# Patient Record
Sex: FEMALE | Race: WHITE | NOT HISPANIC OR LATINO | Employment: OTHER | URBAN - METROPOLITAN AREA
[De-identification: names, ages, dates, MRNs, and addresses within clinical notes are randomized per-mention and may not be internally consistent; named-entity substitution may affect disease eponyms.]

---

## 2017-06-16 ENCOUNTER — TELEPHONE (OUTPATIENT)
Dept: CARDIOLOGY | Facility: CLINIC | Age: 62
End: 2017-06-16

## 2017-06-16 DIAGNOSIS — E78.2 MIXED HYPERLIPIDEMIA: ICD-10-CM

## 2017-06-16 DIAGNOSIS — I10 BENIGN ESSENTIAL HYPERTENSION: Primary | ICD-10-CM

## 2017-06-16 NOTE — TELEPHONE ENCOUNTER
Pt calling scheduling to ask if she will be due for labs 9/2017 with her annual OV. I reviewed 's last OV note. Pt was supposed to have bmp 3/2017 due to hx of hyperkalemia and being on lisinopril. Pt will also be due for flp 9/2017. Rigo MONTEZ

## 2017-09-08 ENCOUNTER — PRE VISIT (OUTPATIENT)
Dept: CARDIOLOGY | Facility: CLINIC | Age: 62
End: 2017-09-08

## 2017-09-13 ENCOUNTER — TRANSFERRED RECORDS (OUTPATIENT)
Dept: HEALTH INFORMATION MANAGEMENT | Facility: CLINIC | Age: 62
End: 2017-09-13

## 2017-09-13 ENCOUNTER — HOSPITAL ENCOUNTER (OUTPATIENT)
Dept: CARDIOLOGY | Facility: CLINIC | Age: 62
Discharge: HOME | End: 2017-09-13
Attending: INTERNAL MEDICINE | Admitting: INTERNAL MEDICINE
Payer: COMMERCIAL

## 2017-09-13 DIAGNOSIS — I25.10 CORONARY ARTERY DISEASE INVOLVING NATIVE CORONARY ARTERY OF NATIVE HEART WITHOUT ANGINA PECTORIS: ICD-10-CM

## 2017-09-13 DIAGNOSIS — R00.2 PALPITATIONS: ICD-10-CM

## 2017-09-13 DIAGNOSIS — E78.2 MIXED HYPERLIPIDEMIA: ICD-10-CM

## 2017-09-13 PROCEDURE — 93018 CV STRESS TEST I&R ONLY: CPT | Performed by: INTERNAL MEDICINE

## 2017-09-13 PROCEDURE — 93321 DOPPLER ECHO F-UP/LMTD STD: CPT | Mod: 26 | Performed by: INTERNAL MEDICINE

## 2017-09-13 PROCEDURE — 93350 STRESS TTE ONLY: CPT | Mod: TC

## 2017-09-13 PROCEDURE — 93350 STRESS TTE ONLY: CPT | Mod: 26 | Performed by: INTERNAL MEDICINE

## 2017-09-13 PROCEDURE — 93325 DOPPLER ECHO COLOR FLOW MAPG: CPT | Mod: 26 | Performed by: INTERNAL MEDICINE

## 2017-09-13 PROCEDURE — 93016 CV STRESS TEST SUPVJ ONLY: CPT | Performed by: INTERNAL MEDICINE

## 2017-09-14 ENCOUNTER — OFFICE VISIT (OUTPATIENT)
Dept: CARDIOLOGY | Facility: CLINIC | Age: 62
End: 2017-09-14
Attending: INTERNAL MEDICINE
Payer: COMMERCIAL

## 2017-09-14 VITALS
SYSTOLIC BLOOD PRESSURE: 118 MMHG | DIASTOLIC BLOOD PRESSURE: 68 MMHG | HEIGHT: 68 IN | WEIGHT: 119.3 LBS | HEART RATE: 60 BPM | BODY MASS INDEX: 18.08 KG/M2

## 2017-09-14 DIAGNOSIS — E78.2 MIXED HYPERLIPIDEMIA: ICD-10-CM

## 2017-09-14 DIAGNOSIS — I25.10 CORONARY ARTERY DISEASE INVOLVING NATIVE CORONARY ARTERY OF NATIVE HEART WITHOUT ANGINA PECTORIS: ICD-10-CM

## 2017-09-14 DIAGNOSIS — I47.29: Primary | ICD-10-CM

## 2017-09-14 DIAGNOSIS — R00.2 PALPITATIONS: ICD-10-CM

## 2017-09-14 LAB
ALT SERPL W P-5'-P-CCNC: 19 U/L (ref 0–50)
CHOLEST SERPL-MCNC: 164 MG/DL
HDLC SERPL-MCNC: 75 MG/DL
LDLC SERPL CALC-MCNC: 80 MG/DL
NONHDLC SERPL-MCNC: 89 MG/DL
TRIGL SERPL-MCNC: 47 MG/DL

## 2017-09-14 PROCEDURE — 36415 COLL VENOUS BLD VENIPUNCTURE: CPT | Performed by: INTERNAL MEDICINE

## 2017-09-14 PROCEDURE — 84460 ALANINE AMINO (ALT) (SGPT): CPT | Performed by: INTERNAL MEDICINE

## 2017-09-14 PROCEDURE — 99214 OFFICE O/P EST MOD 30 MIN: CPT | Performed by: INTERNAL MEDICINE

## 2017-09-14 PROCEDURE — 80061 LIPID PANEL: CPT | Performed by: INTERNAL MEDICINE

## 2017-09-14 RX ORDER — METOPROLOL SUCCINATE 25 MG/1
12.5 TABLET, EXTENDED RELEASE ORAL DAILY
Qty: 15 TABLET | Refills: 3 | Status: SHIPPED | OUTPATIENT
Start: 2017-09-14 | End: 2017-10-04

## 2017-09-14 NOTE — MR AVS SNAPSHOT
After Visit Summary   9/14/2017    Robin Cazares    MRN: [id number]           Patient Information     Date Of Birth          1955        Visit Information        Provider Department      9/14/2017 9:15 AM Ginny Baldwin DO [facility]        Today's Diagnoses     Ventricular tachycardia, re-entry (H)    -  1    Coronary artery disease involving native coronary artery of native heart without angina pectoris        Mixed hyperlipidemia        Palpitations          Care Instructions    RVOT VT (right ventricular outflow tract ventricular tachycardia)          Follow-ups after your visit        Additional Services     Follow-Up with Electrophysiologist                 Your next 10 appointments already scheduled     Oct 04, 2017  9:15 AM CDT   EP NEW with Neela Maria MD   [facility] ([facility])    [address] [address] [address] [phone]              Future tests that were ordered for you today     Open Future Orders        Priority Expected Expires Ordered    Follow-Up with Electrophysiologist Routine 9/29/2017 9/14/2018 9/14/2017            Who to contact     If you have questions or need follow up information about today's clinic visit or your schedule please contact [facility] directly at [phone].  Normal or non-critical lab and imaging results will be communicated to you by MyChart, letter or phone within 4 business days after the clinic has received the results. If you do not hear from us within 7 days, please contact the clinic through MyChart or phone. If you have a critical or abnormal lab result, we will notify you by phone as soon as possible.  Submit refill requests through [employer] or call your pharmacy and they will forward the refill request to us. Please allow 3 business days for your  "refill to be completed.          Additional Information About Your Visit        qLearning Information     qLearning lets you send messages to your doctor, view your test results, renew your prescriptions, schedule appointments and more. To sign up, go to [email or web address] . Click on \"Log in\" on the left side of the screen, which will take you to the Welcome page. Then click on \"Sign up Now\" on the right side of the page.     You will be asked to enter the access code listed below, as well as some personal information. Please follow the directions to create your username and password.     Your access code is: [email or web address]  Expires: 2017  9:37 AM     Your access code will  in 90 days. If you need help or a new code, please call your [place] clinic or [phone].        Care EveryWhere ID     This is your Care EveryWhere ID. This could be used by other organizations to access your [place] medical records  [id number]        Your Vitals Were     Pulse Height BMI (Body Mass Index)             60 1.715 m (5' 7.5\") 18.41 kg/m2          Blood Pressure from Last 3 Encounters:   17 118/68   16 118/80   08/24/15 136/82    Weight from Last 3 Encounters:   17 54.1 kg (119 lb 4.8 oz)   16 58.7 kg (129 lb 6.4 oz)   08/24/15 58.7 kg (129 lb 6.4 oz)              We Performed the Following     Follow-Up with Cardiologist          Today's Medication Changes          These changes are accurate as of: 17  9:52 AM.  If you have any questions, ask your nurse or doctor.               Start taking these medicines.        Dose/Directions    metoprolol 25 MG 24 hr tablet   Commonly known as:  TOPROL-XL   Used for:  Palpitations   Started by:  Ginny Baldwin DO        Dose:  12.5 mg   Take 0.5 tablets (12.5 mg) by mouth daily   Quantity:  15 tablet   Refills:  3            Where to get your medicines      These medications were sent to [facility] - [facility], [place] - " [address] [address]     Phone:  [phone]     metoprolol 25 MG 24 hr tablet                Primary Care Provider Office Phone # Fax #    Keya Sandra [phone]       [place] [facility] [address] [place] [facility] [address]        Equal Access to Services     DEON STOUT : Hadii aad ku hadasho Soomaali, waaxda luqadaha, qaybta kaalmada adeegyada, waxay idiin hayaan adeeg kharash la'aan ah. So [facility] [phone].    ATENCIÓN: Si habla español, tiene a bishop disposición servicios gratuitos de asistencia lingüística. [place] al [phone].    We comply with applicable federal civil rights laws and [place] laws. We do not discriminate on the basis of race, color, national origin, age, disability sex, sexual orientation or gender identity.            Thank you!     Thank you for choosing [facility] [place]  for your care. Our goal is always to provide you with excellent care. Hearing back from our patients is one way we can continue to improve our services. Please take a few minutes to complete the written survey that you may receive in the mail after your visit with us. Thank you!             Your Updated Medication List - Protect others around you: Learn how to safely use, store and throw away your medicines at www.disposemymeds.org.          This list is accurate as of: 9/14/17  9:52 AM.  Always use your most recent med list.                   Brand Name Dispense Instructions for use Diagnosis    aspirin 81 MG chewable tablet      Take 81 mg by mouth daily.        lisinopril 2.5 MG tablet    PRINIVIL/Zestril    90 tablet    Take 1 tablet (2.5 mg) by mouth daily    CAD (coronary artery disease)       metoprolol 25 MG 24 hr tablet    TOPROL-XL    15 tablet    Take 0.5 tablets (12.5 mg) by mouth daily    Palpitations       PROLIA SC      As directed        simvastatin 40 MG tablet    ZOCOR    90 tablet     Take 1 tablet (40 mg) by mouth At Bedtime    CAD (coronary artery disease), Inferior MI (H)       TYLENOL PO      Take by mouth as needed for mild pain or fever

## 2017-09-14 NOTE — PROGRESS NOTES
HPI and Plan:   See dictation  [id number]  Orders Placed This Encounter   Procedures     Follow-Up with Electrophysiologist       Orders Placed This Encounter   Medications     Acetaminophen (TYLENOL PO)     Sig: Take by mouth as needed for mild pain or fever     metoprolol (TOPROL-XL) 25 MG 24 hr tablet     Sig: Take 0.5 tablets (12.5 mg) by mouth daily     Dispense:  15 tablet     Refill:  3       There are no discontinued medications.      Encounter Diagnoses   Name Primary?     Coronary artery disease involving native coronary artery of native heart without angina pectoris      Mixed hyperlipidemia      Palpitations      Ventricular tachycardia, re-entry (H) Yes       CURRENT MEDICATIONS:  Current Outpatient Prescriptions   Medication Sig Dispense Refill     Acetaminophen (TYLENOL PO) Take by mouth as needed for mild pain or fever       metoprolol (TOPROL-XL) 25 MG 24 hr tablet Take 0.5 tablets (12.5 mg) by mouth daily 15 tablet 3     lisinopril (PRINIVIL/ZESTRIL) 2.5 MG tablet Take 1 tablet (2.5 mg) by mouth daily 90 tablet 3     simvastatin (ZOCOR) 40 MG tablet Take 1 tablet (40 mg) by mouth At Bedtime 90 tablet 3     Denosumab (PROLIA SC) As directed       aspirin 81 MG chewable tablet Take 81 mg by mouth daily.         ALLERGIES     Allergies   Allergen Reactions     Fosamax [Alendronic Acid] Palpitations       PAST MEDICAL HISTORY:  Past Medical History:   Diagnosis Date     CAD (coronary artery disease)     8/2008 angioplasty occluded distal OM branch     Hyperlipidemia      Internal hemorrhoids      MI (myocardial infarction) (H)     2008     Osteopenia      Osteoporosis      Palpitations      S/P breast lumpectomy      Vaginal bleeding        PAST SURGICAL HISTORY:  Past Surgical History:   Procedure Laterality Date     HEART CATH, ANGIOPLASTY      8/2008 angioplasty occluded distal OM branch       FAMILY HISTORY:  Family History   Problem Relation Age of Onset     HEART DISEASE Father      Leukemia Mother   "    HEART DISEASE Other        SOCIAL HISTORY:  Social History     Social History     Marital status:      Spouse name: N/A     Number of children: N/A     Years of education: N/A     Social History Main Topics     Smoking status: Never Smoker     Smokeless tobacco: Never Used     Alcohol use 0.0 oz/week     0 Standard drinks or equivalent per week      Comment: moderate     Drug use: None     Sexual activity: Not Asked     Other Topics Concern     Caffeine Concern No     rare     Sleep Concern Yes     Stress Concern No     Weight Concern No     Special Diet No     Exercise No     walks 5-7 days week     Social History Narrative       Review of Systems:  Skin:  Negative       Eyes:  Positive for glasses    ENT:  Positive for postnasal drainage drippy nose right now ,  occas ringing in ears   Respiratory:  Negative       Cardiovascular:    Positive for;palpitations;fatigue occas. palpitations -  usually feels palpitations  when overly tired or  dehydrated , decrease in energy -  occas fatigue   Gastroenterology: Negative      Genitourinary:  Negative      Musculoskeletal:  Positive for back pain lower back pain - stretches daily   Neurologic:  Positive for headaches occas.   Psychiatric:  Positive for sleep disturbances pt states she does not sleep enough ,   Heme/Lymph/Imm:  Positive for easy bruising;allergies;night sweats    Endocrine:  Positive for night sweats occas. night sweats     Physical Exam:  Vitals: /68 (BP Location: Right arm, Patient Position: Sitting, Cuff Size: Adult Regular)  Pulse 60  Ht 1.715 m (5' 7.5\")  Wt 54.1 kg (119 lb 4.8 oz)  BMI 18.41 kg/m2    Constitutional:  cooperative, alert and oriented, well developed, well nourished, in no acute distress        Skin:  warm and dry to the touch        Head:  normocephalic, no masses or lesions        Eyes:  pupils equal and round        ENT:  no pallor or cyanosis, dentition good        Neck:  carotid pulses are full and equal " bilaterally        Chest:  clear to auscultation          Cardiac: regular rhythm;no murmurs, gallops or rubs detected                  Abdomen:  abdomen soft;no bruits        Vascular: pulses full and equal, no bruits auscultated                                        Extremities and Back:  no deformities, clubbing, cyanosis, erythema observed              Neurological:  affect appropriate, oriented to time, person and place;no gross motor deficits              CC  Ginny Baldwin, DO  [address]  [place] [id number]

## 2017-09-14 NOTE — LETTER
9/14/2017    [facility]   [address] [facility] [place] [address]    RE: Robin Cazares       Dear Colleague,    I had the pleasure of seeing Marchristymaria g Sewelllivan in the [facility].    REFERRING PHYSICIAN:  Dr. Keya Sandra      Ms. Cazares is a very pleasant 62-year-old female with a history of coronary disease.  She had a previous inferior wall myocardial infarction back in 2008 and underwent revascularization of her right coronary artery at that time.  She is here for a followup visit today and we had ordered a stress echo this year for monitoring for progression of ischemia.  Last year when we talked, she was having some symptoms of palpitations with exertion.  We had talked about doing a stress test last year but she wanted to push it off until this year.  She is continuing to have symptoms of palpitations on occasion, most commonly with exercise.  She denies any lightheadedness or dizziness, presyncope or syncopal events.  She does not really have any difficulty breathing and is very active, exercising most days of the week.  She did have a little bit of trouble, however, at altitude when she was hiking in the mountains.  When she would have palpitations, she felt more out of breath than she thought she should be but has not felt this way over the past year.  She walked on a treadmill, Kee protocol, for 11-1/2 minutes.  At peak exercise, she did develop a wide-complex tachycardia.  This was reviewed with Electrophysiology and felt to be probably right ventricular outflow tract ventricular tachycardia.  She was recommended to follow up with Electrophysiology with a possible ZIO Patch monitor.  The echocardiogram did not demonstrate evidence for ischemia.  There was a small discrete area of infarct in the inferior septal wall from her previous heart attack.  Her ejection fraction did augment with exercise and she did not have any  symptoms during the stress testing.        LABORATORIES:  She had a cholesterol profile drawn today which I have reviewed use with her.  Total cholesterol is 164, HDL 75, LDL 80 and triglycerides 47.  Liver function tests were normal.      PHYSICAL EXAMINATION:     VITALS SIGNS:  Today, her blood pressure is 118/68, pulse is 60, weight 119, body mass index 18.  She has dropped about 10 pounds in the last year and we did talk about this.  It is probably related to the increased activity and exercise, based upon her history.     NECK:  Carotid upstrokes are brisk without bruit.   CARDIOVASCULAR:  Tones are regular.  I do not hear a murmur, gallop or rub.   LUNGS:  Clear posteriorly.   EXTREMITIES:  She has strong and symmetric pulses in the upper and lower extremities without peripheral edema.     Outpatient Encounter Prescriptions as of 9/14/2017   Medication Sig Dispense Refill     Acetaminophen (TYLENOL PO) Take by mouth as needed for mild pain or fever       [DISCONTINUED] metoprolol (TOPROL-XL) 25 MG 24 hr tablet Take 0.5 tablets (12.5 mg) by mouth daily (Patient not taking: Reported on 10/4/2017) 15 tablet 3     lisinopril (PRINIVIL/ZESTRIL) 2.5 MG tablet Take 1 tablet (2.5 mg) by mouth daily 90 tablet 3     simvastatin (ZOCOR) 40 MG tablet Take 1 tablet (40 mg) by mouth At Bedtime 90 tablet 3     Denosumab (PROLIA SC) Inject Subcutaneous every 6 months As directed        aspirin 81 MG chewable tablet Take 81 mg by mouth daily.       No facility-administered encounter medications on file as of 9/14/2017.       SUMMARY:  Ms. Cazares is a very pleasant 62-year-old female with history of coronary artery disease, previous inferior wall myocardial infarction with preserved LV systolic function.  Her stress test did not indicate evidence for progression of ischemia; however, did demonstrate evidence of a nonsustained ventricular tachycardia, felt to be related to RV outflow tract, per EP.  She has not been on a beta  blocker due to a slow pulse; however, I will try her on a low dose beta blocker at this time given the above findings.  I have started her at 12.5 mg of long-acting metoprolol and cautioned her about the potential side effects.  I will have her follow up with EP for the palpitations and VT noted on stress echo.  Please feel free to contact me with any questions you have in regards to her care.     Again, thank you for allowing me to participate in the care of your patient.      Sincerely,    Ginny Baldwin, DO     [facility]

## 2017-09-14 NOTE — PROGRESS NOTES
REFERRING PHYSICIAN:  Dr. Keya Sandra      HISTORY OF PRESENT ILLNESS:  Ms. Cazares is a very pleasant 62-year-old female with a history of coronary disease.  She had a previous inferior wall myocardial infarction back in 2008 and underwent revascularization of her right coronary artery at that time.  She is here for a followup visit today and we had ordered a stress echo this year for monitoring for progression of ischemia.  Last year when we talked, she was having some symptoms of palpitations with exertion.  We had talked about doing a stress test last year but she wanted to push it off until this year.  She is continuing to have symptoms of palpitations on occasion, most commonly with exercise.  She denies any lightheadedness or dizziness, presyncope or syncopal events.  She does not really have any difficulty breathing and is very active, exercising most days of the week.  She did have a little bit of trouble, however, at altitude when she was hiking in the mountains.  When she would have palpitations, she felt more out of breath than she thought she should be but has not felt this way over the past year.  She walked on a treadmill, Kee protocol, for 11-1/2 minutes.  At peak exercise, she did develop a wide-complex tachycardia.  This was reviewed with Electrophysiology and felt to be probably right ventricular outflow tract ventricular tachycardia.  She was recommended to follow up with Electrophysiology with a possible ZIO Patch monitor.  The echocardiogram did not demonstrate evidence for ischemia.  There was a small discrete area of infarct in the inferior septal wall from her previous heart attack.  Her ejection fraction did augment with exercise and she did not have any symptoms during the stress testing.        LABORATORIES:  She had a cholesterol profile drawn today which I have reviewed use with her.  Total cholesterol is 164, HDL 75, LDL 80 and triglycerides 47.  Liver function tests were  normal.      PHYSICAL EXAMINATION:     VITALS SIGNS:  Today, her blood pressure is 118/68, pulse is 60, weight 119, body mass index 18.  She has dropped about 10 pounds in the last year and we did talk about this.  It is probably related to the increased activity and exercise, based upon her history.     NECK:  Carotid upstrokes are brisk without bruit.   CARDIOVASCULAR:  Tones are regular.  I do not hear a murmur, gallop or rub.   LUNGS:  Clear posteriorly.   EXTREMITIES:  She has strong and symmetric pulses in the upper and lower extremities without peripheral edema.      SUMMARY:  Ms. Cazares is a very pleasant 62-year-old female with history of coronary artery disease, previous inferior wall myocardial infarction with preserved LV systolic function.  Her stress test did not indicate evidence for progression of ischemia; however, did demonstrate evidence of a nonsustained ventricular tachycardia, felt to be related to RV outflow tract, per EP.  She has not been on a beta blocker due to a slow pulse; however, I will try her on a low dose beta blocker at this time given the above findings.  I have started her at 12.5 mg of long-acting metoprolol and cautioned her about the potential side effects.  I will have her follow up with EP for the palpitations and VT noted on stress echo.  Please feel free to contact me with any questions you have in regards to her care.      cc:   Keya Sandra MD    [facility] [place]   [address] [place] [address]         IMELDA ORD DO             D: 2017 09:51   T: 2017 11:18   MT: AUGUSTUS      Name:     RAS CAZARES   MRN:      [id number]        Account:      [id number]   :      1955           Service Date: 2017      Document: [id number]

## 2017-10-04 ENCOUNTER — OFFICE VISIT (OUTPATIENT)
Dept: CARDIOLOGY | Facility: CLINIC | Age: 62
End: 2017-10-04
Attending: INTERNAL MEDICINE
Payer: COMMERCIAL

## 2017-10-04 VITALS
BODY MASS INDEX: 18.19 KG/M2 | DIASTOLIC BLOOD PRESSURE: 76 MMHG | SYSTOLIC BLOOD PRESSURE: 128 MMHG | WEIGHT: 120 LBS | HEIGHT: 68 IN | HEART RATE: 66 BPM

## 2017-10-04 DIAGNOSIS — I25.10 CORONARY ARTERY DISEASE INVOLVING NATIVE CORONARY ARTERY OF NATIVE HEART WITHOUT ANGINA PECTORIS: ICD-10-CM

## 2017-10-04 DIAGNOSIS — R00.2 PALPITATIONS: ICD-10-CM

## 2017-10-04 DIAGNOSIS — I47.29 PAROXYSMAL VENTRICULAR TACHYCARDIA (H): Primary | ICD-10-CM

## 2017-10-04 DIAGNOSIS — E78.2 MIXED HYPERLIPIDEMIA: ICD-10-CM

## 2017-10-04 PROCEDURE — 99215 OFFICE O/P EST HI 40 MIN: CPT | Performed by: INTERNAL MEDICINE

## 2017-10-04 RX ORDER — METOPROLOL SUCCINATE 25 MG/1
25 TABLET, EXTENDED RELEASE ORAL DAILY
Qty: 30 TABLET | Refills: 3 | COMMUNITY
Start: 2017-10-04 | End: 2017-11-15

## 2017-10-04 RX ORDER — MULTIVITAMIN
1 TABLET ORAL DAILY
COMMUNITY

## 2017-10-04 NOTE — PROGRESS NOTES
HPI and Plan:   See dictation    Orders Placed This Encounter   Procedures     Follow-Up with Cardiac Advanced Practice Provider     Singh Patch Monitor       Orders Placed This Encounter   Medications     calcium carb 1250 mg, 500 mg Belkofski,/vitamin D 200 unit (OSCAL 500/200 D-3) 500-200 MG-UNIT per tablet     Sig: Take 1 tablet by mouth as needed (Pt takes when she feels she has not had enough calcium)     Multiple vitamin TABS     Sig: Take by mouth as needed (Pt takes on a PRN basis)     metoprolol (TOPROL-XL) 25 MG 24 hr tablet     Sig: Take 1 tablet (25 mg) by mouth daily     Dispense:  30 tablet     Refill:  3       Medications Discontinued During This Encounter   Medication Reason     metoprolol (TOPROL-XL) 25 MG 24 hr tablet Reorder         Encounter Diagnoses   Name Primary?     Paroxysmal ventricular tachycardia (H) Yes     Coronary artery disease involving native coronary artery of native heart without angina pectoris      Mixed hyperlipidemia      Palpitations        CURRENT MEDICATIONS:  Current Outpatient Prescriptions   Medication Sig Dispense Refill     calcium carb 1250 mg, 500 mg Belkofski,/vitamin D 200 unit (OSCAL 500/200 D-3) 500-200 MG-UNIT per tablet Take 1 tablet by mouth as needed (Pt takes when she feels she has not had enough calcium)       Multiple vitamin TABS Take by mouth as needed (Pt takes on a PRN basis)       metoprolol (TOPROL-XL) 25 MG 24 hr tablet Take 1 tablet (25 mg) by mouth daily 30 tablet 3     Acetaminophen (TYLENOL PO) Take by mouth as needed for mild pain or fever       lisinopril (PRINIVIL/ZESTRIL) 2.5 MG tablet Take 1 tablet (2.5 mg) by mouth daily 90 tablet 3     simvastatin (ZOCOR) 40 MG tablet Take 1 tablet (40 mg) by mouth At Bedtime 90 tablet 3     Denosumab (PROLIA SC) Inject Subcutaneous every 6 months As directed        aspirin 81 MG chewable tablet Take 81 mg by mouth daily.       [DISCONTINUED] metoprolol (TOPROL-XL) 25 MG 24 hr tablet Take 0.5 tablets (12.5 mg) by  mouth daily (Patient not taking: Reported on 10/4/2017) 15 tablet 3       ALLERGIES     Allergies   Allergen Reactions     Fosamax [Alendronic Acid] Palpitations       PAST MEDICAL HISTORY:  Past Medical History:   Diagnosis Date     CAD (coronary artery disease)     8/2008 angioplasty occluded distal OM branch     Hyperlipidemia      Internal hemorrhoids      MI (myocardial infarction)     2008     Osteopenia      Osteoporosis      Palpitations      S/P breast lumpectomy      Vaginal bleeding        PAST SURGICAL HISTORY:  Past Surgical History:   Procedure Laterality Date     HEART CATH, ANGIOPLASTY      8/2008 angioplasty occluded distal OM branch       FAMILY HISTORY:  Family History   Problem Relation Age of Onset     HEART DISEASE Father      Leukemia Mother      HEART DISEASE Other        SOCIAL HISTORY:  Social History     Social History     Marital status:      Spouse name: N/A     Number of children: N/A     Years of education: N/A     Social History Main Topics     Smoking status: Never Smoker     Smokeless tobacco: Never Used     Alcohol use 0.0 oz/week     0 Standard drinks or equivalent per week      Comment: rare      Drug use: None     Sexual activity: Not Asked     Other Topics Concern     Caffeine Concern No     rare     Sleep Concern Yes     Stress Concern No     Weight Concern No     Special Diet No     Exercise No     walks 5-7 days week     Social History Narrative       Review of Systems:  Skin:  Negative       Eyes:  Positive for glasses    ENT:    postnasal drainage;sinus trouble   occas ringing in ears ,  possible seasonal sinus issues   Respiratory:  Negative       Cardiovascular:    Positive for;palpitations;fatigue occas. palpitations -  usually feels palpitations  when overly tired or  dehydrated - also pt states  she feels more palpitations if she takes calcium / or multiple vitamins , decrease in energy -  occas fatigue   Gastroenterology: Negative      Genitourinary:   Negative      Musculoskeletal:  Positive for back pain lower back pain - stretches daily   Neurologic:  Positive for headaches occas.   Psychiatric:  Positive for sleep disturbances pt states she does not sleep enough ,   Heme/Lymph/Imm:  Positive for easy bruising;allergies;night sweats occas. night sweats   Endocrine:  Positive for night sweats occas. night sweats     [id number]

## 2017-10-04 NOTE — PROGRESS NOTES
"HISTORY OF PRESENT ILLNESS:    It was my pleasure seeing Ms. Robin Cazares regarding exercise-induced wide-QRS tachycardia.  She is a very pleasant 62-year-old female who has been referred by Dr. Baldwin.  Ms. Cazares had an inferior wall myocardial infarction in 2008 and underwent PTCA (without stenting) of the RCA.  She has seen Dr. Baldwin for several years.      A stress echo was performed in 09/13/2017 to look for possible progression of her CAD.  The patient exercised for 11 minutes and 36 seconds on a standard Kee protocol.  The stress test was stopped after the patient developed runs of wide-QRS tachycardia during Kee stage 4.  In reviewing the ECG tracings, there is a left bundle-branch block tachycardia with inferior to rightward axis, consistent with ventricular tachycardia.  The longest run was approximately 15 beats.  The tachycardia ceased within 30 seconds in the recovery phase.  Before peak exercise, the patient had occasional single PVCs with the same morphology as the runs of ventricular tachycardia.  The patient had no clear symptoms related to this.      Dr. Baldwin prescribed metoprolol 12.5 mg daily.  The patient recalls being on a beta-blocker after her heart attack and she did not tolerate it because of bradycardia.  Therefore, the prescribed dose was very low.  The patient decided not to take her medication until she saw me today.  She was also supposed to have a cardiac monitor, but she decided not to have that either.      In coming in today, the patient stated she felt well.  She went on the Internet and looked up ventricular tachycardia which raised several concerns.  She had many questions about ventricular tachycardia today.      She describes occasional palpitation with exertion which she describes as \"heart pounding\", but not necessarily fast or irregular.  She has had no syncope recently.  In fact, the only syncopal event she has ever had occurred around age 19 while in " college.  She remembers getting up quickly and ending up on the ground.      There is no family history of sudden cardiac death or arrhythmia.  Her father had a heart attack in his 50s and received a pacemaker later in life.  The patient does not have chest pain with exertion.      She is a nonsmoker and drinks alcohol modestly.  She takes a variety of exercise supplements, though no energy drinks that contain large doses of caffeine.      PHYSICAL EXAMINATION:   VITAL SIGNS:  Blood pressure 128/76, pulse 66 and regular, weight 54 kg.  Height 171 cm.   GENERAL:  She is a healthy, pleasant female in no distress.   HEENT:  Normocephalic, atraumatic.  Sclerae anicteric.  Mouth, oropharynx clear.   NECK:  Supple without thyromegaly or lymphadenopathy.  No carotid bruits.   LUNGS:  Clear.  No crackles or wheezes.   CARDIOVASCULAR:  Normal JVP, regular rhythm without murmur or rub.   ABDOMEN:  Soft, nontender.  Negative HJR.   EXTREMITIES:  No edema, 2+ peripheral pulses throughout.   SKIN:  No rash.   BACK:  No CVA tenderness.   NEUROLOGIC:  Alert and oriented x3.      DIAGNOSTIC STUDIES:    Recent laboratory tests: cholesterol 164, HDL 75, LDL 80, triglycerides 47.        Her recent stress echocardiogram was negative for ischemia.      I have reviewed previous electrocardiograms.  There is no evidence of epsilon waves or other abnormalities to suggest ARVC.      IMPRESSION:    1.  Exercise-induced ventricular tachycardia.  Ms. Cazares is a 62-year-old woman with a previous inferior wall myocardial infarction and a small area of inferoseptal hypokinesis to akinesis by echocardiography.  She developed runs of ventricular tachycardia during exercise with no clear symptoms.  Her NSVT exhibited left bundle-branch block morphology with inferior axis, suggestive of RVOT ectopy.  This arrhythmia appears to be idiopathic and unrelated to her previous myocardial infarction which involved the inferoseptal LV.      I encouraged  "the patient to start taking metoprolol as prescribed by Dr. Baldwin.  In fact, I encouraged her to take 25 mg near bedtime.  She is quite concerned about side-effects and I reassured her that this is a low starting dose.      About 2 weeks later, we will hook up a 14 day ZioPatch monitor during which the patient should be active as per her normal routine.  This is to see whether she has frequent PVCs and/or VT on medical therapy.      I do not see evidence that her ventricular tachycardia has \"malignant potential.\"  I tried to reassure her in this regard.  The patient had numerous questions, many of which were triggered by her internet surfing.      RECOMMENDATIONS:   A.  Start metoprolol XL 12.5 mg at bedtime for 2-3 days and then increase to 25 mg daily.   B.  In about 2 weeks, hook up a 14-day cardiac monitor.   C.  We will call her with the results of her monitor.   D.  Follow-up with Terri in our [facility] in November.   E.  I encouraged her to continue her meds for CAD as per Dr. Baldwin's recommendations.   F.  Avoid Google searches for \"ventricular tachycardia\"!     It was my pleasure seeing Ms. Cazares.  Time spent was 45 minutes with greater than 50% of the time spent in discussion.         PATEL MOY MD, [facility]         cc:   Ginny Baldwin, [facility] [place] [phone]      Keya Sandra MD   [facility] [address] [place] [address]          D: 10/04/2017 10:25   T: 10/04/2017 13:23   MT: KYLE      Name:     RAS CAZARES   MRN:      [id number]        Account:      [id number]   :      1955           Service Date: 10/04/2017      Document: [id number]      "

## 2017-10-04 NOTE — LETTER
10/4/2017    Keya CHOUDHARY Handler  [facility]   [address] [facility] [place] [address]    RE: Robin Cazares       Dear Colleague,    I had the pleasure of seeing Robin Cazares in the [facility].    HISTORY OF PRESENT ILLNESS:    It was my pleasure seeing Ms. Robin Cazares regarding exercise-induced wide-QRS tachycardia.  She is a very pleasant 62-year-old female who has been referred by Dr. Baldwin.  Ms. Cazares had an inferior wall myocardial infarction in 2008 and underwent PTCA (without stenting) of the RCA.  She has seen Dr. Baldwin for several years.      A stress echo was performed in 09/13/2017 to look for possible progression of her CAD.  The patient exercised for 11 minutes and 36 seconds on a standard Kee protocol.  The stress test was stopped after the patient developed runs of wide-QRS tachycardia during Kee stage 4.  In reviewing the ECG tracings, there is a left bundle-branch block tachycardia with inferior to rightward axis, consistent with ventricular tachycardia.  The longest run was approximately 15 beats.  The tachycardia ceased within 30 seconds in the recovery phase.  Before peak exercise, the patient had occasional single PVCs with the same morphology as the runs of ventricular tachycardia.  The patient had no clear symptoms related to this.      Dr. Baldwin prescribed metoprolol 12.5 mg daily.  The patient recalls being on a beta-blocker after her heart attack and she did not tolerate it because of bradycardia.  Therefore, the prescribed dose was very low.  The patient decided not to take her medication until she saw me today.  She was also supposed to have a cardiac monitor, but she decided not to have that either.      In coming in today, the patient stated she felt well.  She went on the Internet and looked up ventricular tachycardia which raised several concerns.  She had many questions about ventricular tachycardia today.  "     She describes occasional palpitation with exertion which she describes as \"heart pounding\", but not necessarily fast or irregular.  She has had no syncope recently.  In fact, the only syncopal event she has ever had occurred around age 19 while in college.  She remembers getting up quickly and ending up on the ground.      There is no family history of sudden cardiac death or arrhythmia.  Her father had a heart attack in his 50s and received a pacemaker later in life.  The patient does not have chest pain with exertion.      She is a nonsmoker and drinks alcohol modestly.  She takes a variety of exercise supplements, though no energy drinks that contain large doses of caffeine.      PHYSICAL EXAMINATION:   VITAL SIGNS:  Blood pressure 128/76, pulse 66 and regular, weight 54 kg.  Height 171 cm.   GENERAL:  She is a healthy, pleasant female in no distress.   HEENT:  Normocephalic, atraumatic.  Sclerae anicteric.  Mouth, oropharynx clear.   NECK:  Supple without thyromegaly or lymphadenopathy.  No carotid bruits.   LUNGS:  Clear.  No crackles or wheezes.   CARDIOVASCULAR:  Normal JVP, regular rhythm without murmur or rub.   ABDOMEN:  Soft, nontender.  Negative HJR.   EXTREMITIES:  No edema, 2+ peripheral pulses throughout.   SKIN:  No rash.   BACK:  No CVA tenderness.   NEUROLOGIC:  Alert and oriented x3.      DIAGNOSTIC STUDIES:    Recent laboratory tests: cholesterol 164, HDL 75, LDL 80, triglycerides 47.        Her recent stress echocardiogram was negative for ischemia.      I have reviewed previous electrocardiograms.  There is no evidence of epsilon waves or other abnormalities to suggest ARVC.     Outpatient Encounter Prescriptions as of 10/4/2017   Medication Sig Dispense Refill     calcium carb 1250 mg, 500 mg [place],/vitamin D 200 unit (OSCAL 500/200 D-3) 500-200 MG-UNIT per tablet Take 1 tablet by mouth as needed (Pt takes when she feels she has not had enough calcium)       Multiple vitamin TABS Take by " "mouth as needed (Pt takes on a PRN basis)       [DISCONTINUED] metoprolol (TOPROL-XL) 25 MG 24 hr tablet Take 1 tablet (25 mg) by mouth daily 30 tablet 3     Acetaminophen (TYLENOL PO) Take by mouth as needed for mild pain or fever       lisinopril (PRINIVIL/ZESTRIL) 2.5 MG tablet Take 1 tablet (2.5 mg) by mouth daily 90 tablet 3     simvastatin (ZOCOR) 40 MG tablet Take 1 tablet (40 mg) by mouth At Bedtime 90 tablet 3     Denosumab (PROLIA SC) Inject Subcutaneous every 6 months As directed        aspirin 81 MG chewable tablet Take 81 mg by mouth daily.       [DISCONTINUED] metoprolol (TOPROL-XL) 25 MG 24 hr tablet Take 0.5 tablets (12.5 mg) by mouth daily (Patient not taking: Reported on 10/4/2017) 15 tablet 3     No facility-administered encounter medications on file as of 10/4/2017.       IMPRESSION:    1.  Exercise-induced ventricular tachycardia.  Ms. Cazares is a 62-year-old woman with a previous inferior wall myocardial infarction and a small area of inferoseptal hypokinesis to akinesis by echocardiography.  She developed runs of ventricular tachycardia during exercise with no clear symptoms.  Her NSVT exhibited left bundle-branch block morphology with inferior axis, suggestive of RVOT ectopy.  This arrhythmia appears to be idiopathic and unrelated to her previous myocardial infarction which involved the inferoseptal LV.      I encouraged the patient to start taking metoprolol as prescribed by Dr. Baldwin.  In fact, I encouraged her to take 25 mg near bedtime.  She is quite concerned about side-effects and I reassured her that this is a low starting dose.      About 2 weeks later, we will hook up a 14 day ZioPatch monitor during which the patient should be active as per her normal routine.  This is to see whether she has frequent PVCs and/or VT on medical therapy.      I do not see evidence that her ventricular tachycardia has \"malignant potential.\"  I tried to reassure her in this regard.  The patient had " "numerous questions, many of which were triggered by her internet surfing.      RECOMMENDATIONS:   A.  Start metoprolol XL 12.5 mg at bedtime for 2-3 days and then increase to 25 mg daily.   B.  In about 2 weeks, hook up a 14-day cardiac monitor.   C.  We will call her with the results of her monitor.   D.  Follow-up with Terri in our [place] clinic in November.   E.  I encouraged her to continue her meds for CAD as per Dr. Baldwin's recommendations.   F.  Avoid Google searches for \"ventricular tachycardia\"!     It was my pleasure seeing Ms. Cazares.  Time spent was 45 minutes with greater than 50% of the time spent in discussion.     Sincerely,    Neela Maria MD     [facility]    cc:   Ginny Baldwin,    [facility] at [address]   [place]  [address]     "

## 2017-10-04 NOTE — MR AVS SNAPSHOT
After Visit Summary   10/4/2017    Robin Cazares    MRN: [id number]           Patient Information     Date Of Birth          1955        Visit Information        Provider Department      10/4/2017 9:15 AM Neela Maria MD [facility] [place]        Today's Diagnoses     Paroxysmal ventricular tachycardia (H)    -  1    Coronary artery disease involving native coronary artery of native heart without angina pectoris        Mixed hyperlipidemia        Palpitations           Follow-ups after your visit        Additional Services     Follow-Up with Cardiac Advanced Practice Provider                 Your next 10 appointments already scheduled     Oct 18, 2017  3:45 PM CDT   ZIOPATCH MONITOR with [facility] ([facility])    [address]  [facility] [address] [phone]           LOCATION - [address] [facility], [address] [place] [address] **Please check-in at the [facility], [address], in the [facility] building. When you are finished registering, please go to [address] and have a seat.            Nov 15, 2017  7:30 AM CST   [facility] EP RETURN with GUICHO Winkler CNP   [facility] ([facility])    [address]  [facility] [address] [phone]              Future tests that were ordered for you today     Open Future Orders        Priority Expected Expires Ordered    Follow-Up with Cardiac Advanced Practice Provider Routine 11/16/2017 10/4/2018 10/4/2017    Zio Patch Monitor Routine 10/16/2017 10/4/2018 10/4/2017            Who to contact     If you have questions or need follow up information about today's clinic visit or your schedule please contact [facility] [place] directly at [phone].  Normal or non-critical lab  "and imaging results will be communicated to you by MyChart, letter or phone within 4 business days after the clinic has received the results. If you do not hear from us within 7 days, please contact the clinic through [employer] or phone. If you have a critical or abnormal lab result, we will notify you by phone as soon as possible.  Submit refill requests through [employer] or call your pharmacy and they will forward the refill request to us. Please allow 3 business days for your refill to be completed.          Additional Information About Your Visit        [employer] Information     P[employer]on lets you send messages to your doctor, view your test results, renew your prescriptions, schedule appointments and more. To sign up, go to [email or web address] . Click on \"Log in\" on the left side of the screen, which will take you to the Welcome page. Then click on \"Sign up Now\" on the right side of the page.     You will be asked to enter the access code listed below, as well as some personal information. Please follow the directions to create your username and password.     Your access code is: [email or web address]  Expires: 2017  9:37 AM     Your access code will  in 90 days. If you need help or a new code, please call your [place] clinic or [phone].        Care EveryWhere ID     This is your Care EveryWhere ID. This could be used by other organizations to access your [place] medical records  [id number]        Your Vitals Were     Pulse Height BMI (Body Mass Index)             66 1.715 m (5' 7.5\") 18.52 kg/m2          Blood Pressure from Last 3 Encounters:   10/04/17 128/76   17 118/68   16 118/80    Weight from Last 3 Encounters:   10/04/17 54.4 kg (120 lb)   17 54.1 kg (119 lb 4.8 oz)   16 58.7 kg (129 lb 6.4 oz)              We Performed the Following     Follow-Up with Electrophysiologist          Today's Medication Changes          These changes are accurate as of: 10/4/17 10:01 AM.  If " you have any questions, ask your nurse or doctor.               These medicines have changed or have updated prescriptions.        Dose/Directions    metoprolol 25 MG 24 hr tablet   Commonly known as:  TOPROL-XL   This may have changed:  how much to take   Used for:  Palpitations   Changed by:  Neela Maria MD        Dose:  25 mg   Take 1 tablet (25 mg) by mouth daily   Quantity:  30 tablet   Refills:  3                Primary Care Provider Office Phone # Fax #    Keya CHOUDHARY MD [place] [phone]       [address] [address] [address] [facility] [address]        Equal Access to Services     [facility] AH: Hadii aad ku hadasho Sojenny, waaxda luqadaha, qaybta kaalmada adeegyada, susana rivera haypamela regan . So [facility] [phone].    ATENCIÓN: Si habla español, tiene a bishop disposición servicios gratuitos de asistencia lingüística. [facility] [phone].    We comply with applicable federal civil rights laws and [place] laws. We do not discriminate on the basis of race, color, national origin, age, disability, sex, sexual orientation, or gender identity.            Thank you!     Thank you for choosing [facility] [place]  for your care. Our goal is always to provide you with excellent care. Hearing back from our patients is one way we can continue to improve our services. Please take a few minutes to complete the written survey that you may receive in the mail after your visit with us. Thank you!             Your Updated Medication List - Protect others around you: Learn how to safely use, store and throw away your medicines at www.disposemymeds.org.          This list is accurate as of: 10/4/17 10:01 AM.  Always use your most recent med list.                   Brand Name Dispense Instructions for use Diagnosis    aspirin 81 MG chewable tablet      Take 81 mg by mouth daily.        lisinopril 2.5 MG tablet     PRINIVIL/Zestril    90 tablet    Take 1 tablet (2.5 mg) by mouth daily    CAD (coronary artery disease)       metoprolol 25 MG 24 hr tablet    TOPROL-XL    30 tablet    Take 1 tablet (25 mg) by mouth daily    Palpitations       Multiple vitamin Tabs      Take by mouth as needed (Pt takes on a PRN basis)        OSCAL 500/200 D-3 500-200 MG-UNIT per tablet   Generic drug:  calcium carb 1250 mg (500 mg [place])/vitamin D 200 unit      Take 1 tablet by mouth as needed (Pt takes when she feels she has not had enough calcium)        PROLIA SC      Inject Subcutaneous every 6 months As directed        simvastatin 40 MG tablet    ZOCOR    90 tablet    Take 1 tablet (40 mg) by mouth At Bedtime    CAD (coronary artery disease), Inferior MI (H)       TYLENOL PO      Take by mouth as needed for mild pain or fever

## 2017-10-18 ENCOUNTER — HOSPITAL ENCOUNTER (OUTPATIENT)
Dept: CARDIOLOGY | Facility: CLINIC | Age: 62
Discharge: HOME | End: 2017-10-18
Attending: INTERNAL MEDICINE | Admitting: INTERNAL MEDICINE
Payer: COMMERCIAL

## 2017-10-18 PROCEDURE — 0296T ZIO PATCH HOLTER: CPT

## 2017-10-18 PROCEDURE — 0298T ZZC EXT ECG > 48HR TO 21 DAY REVIEW AND INTERPRETATN: CPT | Performed by: INTERNAL MEDICINE

## 2017-11-15 ENCOUNTER — OFFICE VISIT (OUTPATIENT)
Dept: CARDIOLOGY | Facility: CLINIC | Age: 62
End: 2017-11-15
Attending: INTERNAL MEDICINE
Payer: COMMERCIAL

## 2017-11-15 VITALS
WEIGHT: 122 LBS | HEART RATE: 60 BPM | HEIGHT: 68 IN | DIASTOLIC BLOOD PRESSURE: 80 MMHG | BODY MASS INDEX: 18.49 KG/M2 | SYSTOLIC BLOOD PRESSURE: 120 MMHG

## 2017-11-15 DIAGNOSIS — R00.2 PALPITATIONS: ICD-10-CM

## 2017-11-15 DIAGNOSIS — I25.10 CORONARY ARTERY DISEASE INVOLVING NATIVE CORONARY ARTERY OF NATIVE HEART WITHOUT ANGINA PECTORIS: Primary | ICD-10-CM

## 2017-11-15 PROCEDURE — 99214 OFFICE O/P EST MOD 30 MIN: CPT | Performed by: NURSE PRACTITIONER

## 2017-11-15 RX ORDER — METOPROLOL SUCCINATE 25 MG/1
25 TABLET, EXTENDED RELEASE ORAL DAILY
Qty: 90 TABLET | Refills: 3 | Status: SHIPPED | OUTPATIENT
Start: 2017-11-15 | End: 2018-08-17

## 2017-11-15 NOTE — PROGRESS NOTES
HPI and Plan:   See dictation    Orders Placed This Encounter   Procedures     Lipid panel reflex to direct LDL Fasting     Follow-Up with Cardiologist       Orders Placed This Encounter   Medications     metoprolol (TOPROL-XL) 25 MG 24 hr tablet     Sig: Take 1 tablet (25 mg) by mouth daily     Dispense:  90 tablet     Refill:  3       Medications Discontinued During This Encounter   Medication Reason     metoprolol (TOPROL-XL) 25 MG 24 hr tablet Reorder         Encounter Diagnoses   Name Primary?     Palpitations      Coronary artery disease involving native coronary artery of native heart without angina pectoris Yes       CURRENT MEDICATIONS:  Current Outpatient Prescriptions   Medication Sig Dispense Refill     metoprolol (TOPROL-XL) 25 MG 24 hr tablet Take 1 tablet (25 mg) by mouth daily 90 tablet 3     calcium carb 1250 mg, 500 mg [place],/vitamin D 200 unit (OSCAL 500/200 D-3) 500-200 MG-UNIT per tablet Take 1 tablet by mouth as needed (Pt takes when she feels she has not had enough calcium)       Multiple vitamin TABS Take by mouth as needed (Pt takes on a PRN basis)       Acetaminophen (TYLENOL PO) Take by mouth as needed for mild pain or fever       lisinopril (PRINIVIL/ZESTRIL) 2.5 MG tablet Take 1 tablet (2.5 mg) by mouth daily 90 tablet 3     simvastatin (ZOCOR) 40 MG tablet Take 1 tablet (40 mg) by mouth At Bedtime 90 tablet 3     Denosumab (PROLIA SC) Inject Subcutaneous every 6 months As directed        aspirin 81 MG chewable tablet Take 81 mg by mouth daily.       [DISCONTINUED] metoprolol (TOPROL-XL) 25 MG 24 hr tablet Take 1 tablet (25 mg) by mouth daily 30 tablet 3       ALLERGIES     Allergies   Allergen Reactions     Fosamax [Alendronic Acid] Palpitations       PAST MEDICAL HISTORY:  Past Medical History:   Diagnosis Date     CAD (coronary artery disease)     8/2008 angioplasty occluded distal OM branch     Hyperlipidemia      Internal hemorrhoids      MI (myocardial infarction)     2008      "Osteopenia      Osteoporosis      Palpitations      S/P breast lumpectomy      Vaginal bleeding        PAST SURGICAL HISTORY:  Past Surgical History:   Procedure Laterality Date     HEART CATH, ANGIOPLASTY      8/2008 angioplasty occluded distal OM branch       FAMILY HISTORY:  Family History   Problem Relation Age of Onset     HEART DISEASE Father      Leukemia Mother      HEART DISEASE Other        SOCIAL HISTORY:  Social History     Social History     Marital status:      Spouse name: N/A     Number of children: N/A     Years of education: N/A     Social History Main Topics     Smoking status: Never Smoker     Smokeless tobacco: Never Used     Alcohol use 0.0 oz/week     0 Standard drinks or equivalent per week      Comment: rare      Drug use: None     Sexual activity: Not Asked     Other Topics Concern     Caffeine Concern No     rare     Sleep Concern Yes     Stress Concern No     Weight Concern No     Special Diet No     Exercise No     walks 5-7 days week     Social History Narrative       Review of Systems:  Skin:  Negative for       Eyes:  Positive for glasses    ENT:  Negative for      Respiratory:  Negative for       Cardiovascular:    palpitations;Positive for;fatigue palpitations occ  Gastroenterology: Negative for      Genitourinary:  Negative for      Musculoskeletal:  Negative for      Neurologic:  Negative      Psychiatric:  Positive for sleep disturbances    Heme/Lymph/Imm:  Negative      Endocrine:  Negative        Physical Exam:  Vitals: /80  Pulse 60  Ht 1.715 m (5' 7.5\")  Wt 55.3 kg (122 lb)  BMI 18.83 kg/m2    Constitutional:           Skin:             Head:           Eyes:           Lymph:      ENT:           Neck:           Respiratory:            Cardiac:                                                           GI:           Extremities and Muscular Skeletal:                 Neurological:           Psych:           CC  Neela Maria MD  [address]  [place] [id number]

## 2017-11-15 NOTE — PROGRESS NOTES
HISTORY OF PRESENT ILLNESS:  Robin is a 62-year-old patient presenting in clinic today for a followup visit.  She was seen by Dr. Maria for an EP consultation on 10/04/2017 and typically follows with Dr. Baldwin.  She has a history of coronary artery disease with an inferior wall myocardial infarction in 2008, which was treated with PTCA without stenting to the RCA.  She has been maintained on an excellent medication regimen for hypertension and dyslipidemia in addition to the coronary artery disease.  At an annual visit this year, a stress echo was performed.  She exercised for 11 minutes 36 seconds.  Stress test was stopped after she developed runs of wide QRS tachycardia during Kee stage 4.  ECG tracings showed a left bundle branch block tachycardia with inferior to rightward axis consistent with VT.  The longest run was 15 beats.  Tachycardia ceased within 30 seconds into the recovery phase.      In followup, Dr. Baldwin prescribed metoprolol 12.5 mg.  There was some concern with this medication given a history of bradycardia.  She was then referred to see Dr. Maria.      At her visit with Dr. Maria, the metoprolol was increased to 25 mg.  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  He then ordered a 2 week Ziopatch.      We reviewed the Ziopatch today showing a very low burden (less than 1%) of PVCs.  There were no runs of VT.  There were infrequent runs of SVT.  There was no significant daytime bradycardia, although she did have some early morning bradycardia with heart rates in the 30-40s.  She was not specifically symptomatic during the monitor.      In clinic today, Robin tells me she has been feeling well.  She has been leading her normal, very active lifestyle and has not had any symptoms of palpitations, lightheadedness, dizziness, chest pain, presyncope or syncope.  She takes Toprol at night and feels she is tolerating it well, although maybe with mild increase in her  fatigue symptoms.      Blood pressure is 120/80.  Heart rate is 60.      PHYSICAL EXAMINATION:   GENERAL:  Well-appearing female in no acute distress.   HEENT:  Normocephalic, atraumatic.   NECK:  Supple.   LUNGS:  Clear.   CARDIAC:  S1, S2, regular rate and rhythm.  No murmurs, rubs or gallops.     ABDOMEN:  Soft.   EXTREMITIES:  Free of any edema.      IMPRESSION AND PLAN:  Ms. Cazares is a delightful, 62-year-old female with a history of MI with RCA, PTCA in the past, CAD, hypertension, dyslipidemia and VT.  Stress echo this year led to a run of ventricular tachycardia during her stage 4 on the Kee protocol.  Dr. Maria saw her in consultation.  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  She is doing well on a higher dose of metoprolol, and her Ziopatch worn for 2 weeks showed no sustained or runs of ventricular tachycardia, and there was very low burden of any ectopy at all.  Dr. Maria reviewed this and feels the findings are very reassuring.  She is tolerating the metoprolol at this point in time.  She is going to follow up with Dr. Baldwin at her regularly annual scheduled visit with labs.  She is to call us should she have any questions or concerns.         GUICHO WILSON, CNP             D: 11/15/2017 07:52   T: 11/15/2017 09:54   MT: rudy      Name:     RAS CAZARES   MRN:      [id number]        Account:      [id number]   :      1955           Service Date: 11/15/2017      Document: [id number]

## 2017-11-15 NOTE — LETTER
11/15/2017    Keya CHOUDHARY Handler  [facility] [address] [facility] [address]      RE: Edouardmaria g ACOSTA Sage     Dear Colleague,    I had the pleasure of seeing Robin Cazares in the [facility].    HISTORY OF PRESENT ILLNESS:  Robin is a 62-year-old patient presenting in clinic today for a followup visit.  She was seen by Dr. Maria for an EP consultation on 10/04/2017 and typically follows with Dr. Baldwin.  She has a history of coronary artery disease with an inferior wall myocardial infarction in 2008, which was treated with PTCA without stenting to the RCA.  She has been maintained on an excellent medication regimen for hypertension and dyslipidemia in addition to the coronary artery disease.  At an annual visit this year, a stress echo was performed.  She exercised for 11 minutes 36 seconds.  Stress test was stopped after she developed runs of wide QRS tachycardia during Kee stage 4.  ECG tracings showed a left bundle branch block tachycardia with inferior to rightward axis consistent with VT.  The longest run was 15 beats.  Tachycardia ceased within 30 seconds into the recovery phase.      In followup, Dr. Baldwin prescribed metoprolol 12.5 mg.  There was some concern with this medication given a history of bradycardia.  She was then referred to see Dr. Maria.      At her visit with Dr. Maria, the metoprolol was increased to 25 mg.  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  He then ordered a 2 week Ziopatch.      We reviewed the Ziopatch today showing a very low burden (less than 1%) of PVCs.  There were no runs of VT.  There were infrequent runs of SVT.  There was no significant daytime bradycardia, although she did have some early morning bradycardia with heart rates in the 30-40s.  She was not specifically symptomatic during the monitor.      In clinic today, Robin tells me she has been feeling well.  She has been  leading her normal, very active lifestyle and has not had any symptoms of palpitations, lightheadedness, dizziness, chest pain, presyncope or syncope.  She takes Toprol at night and feels she is tolerating it well, although maybe with mild increase in her fatigue symptoms.      Blood pressure is 120/80.  Heart rate is 60.      PHYSICAL EXAMINATION:   GENERAL:  Well-appearing female in no acute distress.   HEENT:  Normocephalic, atraumatic.   NECK:  Supple.   LUNGS:  Clear.   CARDIAC:  S1, S2, regular rate and rhythm.  No murmurs, rubs or gallops.     ABDOMEN:  Soft.   EXTREMITIES:  Free of any edema.      IMPRESSION AND PLAN:  Ms. Cazares is a delightful, 62-year-old female with a history of MI with RCA, PTCA in the past, CAD, hypertension, dyslipidemia and VT.  Stress echo this year led to a run of ventricular tachycardia during her stage 4 on the Kee protocol.  Dr. Maria saw her in consultation.  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  She is doing well on a higher dose of metoprolol, and her Ziopatch worn for 2 weeks showed no sustained or runs of ventricular tachycardia, and there was very low burden of any ectopy at all.  Dr. Maria reviewed this and feels the findings are very reassuring.  She is tolerating the metoprolol at this point in time.  She is going to follow up with Dr. Baldwin at her regularly annual scheduled visit with labs.  She is to call us should she have any questions or concerns.     Outpatient Encounter Prescriptions as of 11/15/2017   Medication Sig Dispense Refill     metoprolol (TOPROL-XL) 25 MG 24 hr tablet Take 1 tablet (25 mg) by mouth daily 90 tablet 3     calcium carb 1250 mg, 500 mg [place],/vitamin D 200 unit (OSCAL 500/200 D-3) 500-200 MG-UNIT per tablet Take 1 tablet by mouth as needed (Pt takes when she feels she has not had enough calcium)       Multiple vitamin TABS Take by mouth as needed (Pt takes on a PRN basis)       Acetaminophen  (TYLENOL PO) Take by mouth as needed for mild pain or fever       [DISCONTINUED] lisinopril (PRINIVIL/ZESTRIL) 2.5 MG tablet Take 1 tablet (2.5 mg) by mouth daily 90 tablet 3     [DISCONTINUED] simvastatin (ZOCOR) 40 MG tablet Take 1 tablet (40 mg) by mouth At Bedtime 90 tablet 3     Denosumab (PROLIA SC) Inject Subcutaneous every 6 months As directed        aspirin 81 MG chewable tablet Take 81 mg by mouth daily.       [DISCONTINUED] metoprolol (TOPROL-XL) 25 MG 24 hr tablet Take 1 tablet (25 mg) by mouth daily 30 tablet 3     No facility-administered encounter medications on file as of 11/15/2017.      Again, thank you for allowing me to participate in the care of your patient.      Sincerely,    Terri Morrison, RAHEEL, APRN [facility]

## 2017-11-15 NOTE — MR AVS SNAPSHOT
"              After Visit Summary   11/15/2017    Robin Cazares    MRN: [id number]           Patient Information     Date Of Birth          1955        Visit Information        Provider Department      11/15/2017 7:30 AM Terri Morrison APRN CNP [facility]        Today's Diagnoses     Coronary artery disease involving native coronary artery of native heart without angina pectoris    -  1    Palpitations           Follow-ups after your visit        Additional Services     Follow-Up with Cardiologist                 Future tests that were ordered for you today     Open Future Orders        Priority Expected Expires Ordered    Follow-Up with Cardiologist Routine 8/15/2018 11/15/2019 11/15/2017    Lipid panel reflex to direct LDL Fasting Routine 8/15/2018 11/15/2018 11/15/2017            Who to contact     If you have questions or need follow up information about today's clinic visit or your schedule please contact [facility] directly at [phone].  Normal or non-critical lab and imaging results will be communicated to you by [employer], letter or phone within 4 business days after the clinic has received the results. If you do not hear from us within 7 days, please contact the clinic through [employer] or phone. If you have a critical or abnormal lab result, we will notify you by phone as soon as possible.  Submit refill requests through [employer] or call your pharmacy and they will forward the refill request to us. Please allow 3 business days for your refill to be completed.          Additional Information About Your Visit        Sina We[employer]art Information     [employer] lets you send messages to your doctor, view your test results, renew your prescriptions, schedule appointments and more. To sign up, go to [email or web address] . Click on \"Log in\" on the left side of the screen, which will take you to the Welcome page. " "Then click on \"Sign up Now\" on the right side of the page.     You will be asked to enter the access code listed below, as well as some personal information. Please follow the directions to create your username and password.     Your access code is: [email or web address]  Expires: 2017  8:37 AM     Your access code will  in 90 days. If you need help or a new code, please call your [facility] clinic or [phone].        Care EveryWhere ID     This is your Care EveryWhere ID. This could be used by other organizations to access your [facility] medical records  [id number]        Your Vitals Were     Pulse Height BMI (Body Mass Index)             60 1.715 m (5' 7.5\") 18.83 kg/m2          Blood Pressure from Last 3 Encounters:   11/15/17 120/80   10/04/17 128/76   17 118/68    Weight from Last 3 Encounters:   11/15/17 55.3 kg (122 lb)   10/04/17 54.4 kg (120 lb)   17 54.1 kg (119 lb 4.8 oz)              We Performed the Following     Follow-Up with Cardiac Advanced Practice Provider          Where to get your medicines      These medications were sent to [facility] [address] [address]     Phone:  [phone]     metoprolol 25 MG 24 hr tablet          Primary Care Provider Office Phone # Fax #    Keya FUNMI Sandra [phone]       [address] [facility] [address]        Equal Access to Services     [facility] : Hadii chris hopkins hadkala Sojenny, waaxda luqadaha, qaybta kaalmasusana potter. So [facility] [phone].    ATENCIÓN: Si habla español, tiene a bishop disposición servicios gratuitos de asistencia lingüística. [place] al [phone].    We comply with applicable federal civil rights laws and [place] laws. We do not discriminate on the basis of race, color, national origin, age, disability, sex, sexual orientation, or gender " identity.            Thank you!     Thank you for choosing [facility]  for your care. Our goal is always to provide you with excellent care. Hearing back from our patients is one way we can continue to improve our services. Please take a few minutes to complete the written survey that you may receive in the mail after your visit with us. Thank you!             Your Updated Medication List - Protect others around you: Learn how to safely use, store and throw away your medicines at www.disposemymeds.org.          This list is accurate as of: 11/15/17  7:53 AM.  Always use your most recent med list.                   Brand Name Dispense Instructions for use Diagnosis    aspirin 81 MG chewable tablet      Take 81 mg by mouth daily.        lisinopril 2.5 MG tablet    PRINIVIL/Zestril    90 tablet    Take 1 tablet (2.5 mg) by mouth daily    CAD (coronary artery disease)       metoprolol 25 MG 24 hr tablet    TOPROL-XL    90 tablet    Take 1 tablet (25 mg) by mouth daily    Palpitations       Multiple vitamin Tabs      Take by mouth as needed (Pt takes on a PRN basis)        OSCAL 500/200 D-3 500-200 MG-UNIT per tablet   Generic drug:  calcium carb 1250 mg (500 mg [place])/vitamin D 200 unit      Take 1 tablet by mouth as needed (Pt takes when she feels she has not had enough calcium)        PROLIA SC      Inject Subcutaneous every 6 months As directed        simvastatin 40 MG tablet    ZOCOR    90 tablet    Take 1 tablet (40 mg) by mouth At Bedtime    CAD (coronary artery disease), Inferior MI (H)       TYLENOL PO      Take by mouth as needed for mild pain or fever

## 2017-12-07 DIAGNOSIS — I21.19 INFERIOR MI (H): ICD-10-CM

## 2017-12-07 DIAGNOSIS — I25.10 CAD (CORONARY ARTERY DISEASE): ICD-10-CM

## 2017-12-07 RX ORDER — LISINOPRIL 2.5 MG/1
2.5 TABLET ORAL DAILY
Qty: 90 TABLET | Refills: 3 | Status: ON HOLD | OUTPATIENT
Start: 2017-12-07 | End: 2018-06-04

## 2017-12-07 RX ORDER — SIMVASTATIN 40 MG
40 TABLET ORAL AT BEDTIME
Qty: 90 TABLET | Refills: 3 | Status: SHIPPED | OUTPATIENT
Start: 2017-12-07 | End: 2018-08-17

## 2018-02-19 DIAGNOSIS — R00.2 PALPITATIONS: Primary | ICD-10-CM

## 2018-02-19 RX ORDER — METOPROLOL SUCCINATE 25 MG/1
25 TABLET, EXTENDED RELEASE ORAL DAILY
Qty: 7 TABLET | Refills: 0 | Status: SHIPPED | OUTPATIENT
Start: 2018-02-19 | End: 2018-06-01

## 2018-06-01 ENCOUNTER — APPOINTMENT (OUTPATIENT)
Dept: GENERAL RADIOLOGY | Facility: CLINIC | Age: 63
End: 2018-06-01
Attending: EMERGENCY MEDICINE
Payer: COMMERCIAL

## 2018-06-01 ENCOUNTER — HOSPITAL ENCOUNTER (INPATIENT)
Facility: CLINIC | Age: 63
LOS: 3 days | Discharge: HOME | End: 2018-06-04
Attending: EMERGENCY MEDICINE | Admitting: HOSPITALIST
Payer: COMMERCIAL

## 2018-06-01 DIAGNOSIS — S72.001A FRACTURE OF HIP, RIGHT, CLOSED, INITIAL ENCOUNTER (H): ICD-10-CM

## 2018-06-01 DIAGNOSIS — T07.XXXA MULTIPLE ABRASIONS: ICD-10-CM

## 2018-06-01 LAB
ALBUMIN UR-MCNC: 10 MG/DL
ANION GAP SERPL CALCULATED.3IONS-SCNC: 6 MMOL/L (ref 3–14)
APPEARANCE UR: CLEAR
BASOPHILS # BLD AUTO: 0 10E9/L (ref 0–0.2)
BASOPHILS NFR BLD AUTO: 0.1 %
BILIRUB UR QL STRIP: NEGATIVE
BUN SERPL-MCNC: 24 MG/DL (ref 7–30)
CALCIUM SERPL-MCNC: 10.3 MG/DL (ref 8.5–10.1)
CHLORIDE SERPL-SCNC: 104 MMOL/L (ref 94–109)
CO2 SERPL-SCNC: 27 MMOL/L (ref 20–32)
COLOR UR AUTO: YELLOW
CREAT SERPL-MCNC: 0.7 MG/DL (ref 0.52–1.04)
DIFFERENTIAL METHOD BLD: ABNORMAL
EOSINOPHIL # BLD AUTO: 0 10E9/L (ref 0–0.7)
EOSINOPHIL NFR BLD AUTO: 0.1 %
ERYTHROCYTE [DISTWIDTH] IN BLOOD BY AUTOMATED COUNT: 12.5 % (ref 10–15)
GFR SERPL CREATININE-BSD FRML MDRD: 84 ML/MIN/1.7M2
GLUCOSE SERPL-MCNC: 119 MG/DL (ref 70–99)
GLUCOSE UR STRIP-MCNC: NEGATIVE MG/DL
HCT VFR BLD AUTO: 40.4 % (ref 35–47)
HGB BLD-MCNC: 13.6 G/DL (ref 11.7–15.7)
HGB UR QL STRIP: NEGATIVE
IMM GRANULOCYTES # BLD: 0 10E9/L (ref 0–0.4)
IMM GRANULOCYTES NFR BLD: 0.2 %
INR PPP: 1.03 (ref 0.86–1.14)
KETONES UR STRIP-MCNC: 10 MG/DL
LEUKOCYTE ESTERASE UR QL STRIP: NEGATIVE
LYMPHOCYTES # BLD AUTO: 0.7 10E9/L (ref 0.8–5.3)
LYMPHOCYTES NFR BLD AUTO: 6.4 %
MCH RBC QN AUTO: 29.9 PG (ref 26.5–33)
MCHC RBC AUTO-ENTMCNC: 33.7 G/DL (ref 31.5–36.5)
MCV RBC AUTO: 89 FL (ref 78–100)
MONOCYTES # BLD AUTO: 0.9 10E9/L (ref 0–1.3)
MONOCYTES NFR BLD AUTO: 7.8 %
MUCOUS THREADS #/AREA URNS LPF: PRESENT /LPF
NEUTROPHILS # BLD AUTO: 9.9 10E9/L (ref 1.6–8.3)
NEUTROPHILS NFR BLD AUTO: 85.4 %
NITRATE UR QL: NEGATIVE
NRBC # BLD AUTO: 0 10*3/UL
NRBC BLD AUTO-RTO: 0 /100
PH UR STRIP: 7 PH (ref 5–7)
PLATELET # BLD AUTO: 201 10E9/L (ref 150–450)
POTASSIUM SERPL-SCNC: 4.3 MMOL/L (ref 3.4–5.3)
RBC # BLD AUTO: 4.55 10E12/L (ref 3.8–5.2)
RBC #/AREA URNS AUTO: 2 /HPF (ref 0–2)
SODIUM SERPL-SCNC: 137 MMOL/L (ref 133–144)
SOURCE: ABNORMAL
SP GR UR STRIP: 1.02 (ref 1–1.03)
UROBILINOGEN UR STRIP-MCNC: NORMAL MG/DL (ref 0–2)
WBC # BLD AUTO: 11.6 10E9/L (ref 4–11)
WBC #/AREA URNS AUTO: 1 /HPF (ref 0–5)

## 2018-06-01 PROCEDURE — 99285 EMERGENCY DEPT VISIT HI MDM: CPT | Mod: 25

## 2018-06-01 PROCEDURE — 51702 INSERT TEMP BLADDER CATH: CPT

## 2018-06-01 PROCEDURE — 25000132 ZZH RX MED GY IP 250 OP 250 PS 637: Performed by: HOSPITALIST

## 2018-06-01 PROCEDURE — 73502 X-RAY EXAM HIP UNI 2-3 VIEWS: CPT

## 2018-06-01 PROCEDURE — 81001 URINALYSIS AUTO W/SCOPE: CPT | Performed by: PHYSICIAN ASSISTANT

## 2018-06-01 PROCEDURE — 80048 BASIC METABOLIC PNL TOTAL CA: CPT | Performed by: PHYSICIAN ASSISTANT

## 2018-06-01 PROCEDURE — 12000007 ZZH R&B INTERMEDIATE

## 2018-06-01 PROCEDURE — 93005 ELECTROCARDIOGRAM TRACING: CPT

## 2018-06-01 PROCEDURE — 85025 COMPLETE CBC W/AUTO DIFF WBC: CPT | Performed by: PHYSICIAN ASSISTANT

## 2018-06-01 PROCEDURE — 25000132 ZZH RX MED GY IP 250 OP 250 PS 637: Performed by: EMERGENCY MEDICINE

## 2018-06-01 PROCEDURE — 99223 1ST HOSP IP/OBS HIGH 75: CPT | Mod: AI | Performed by: HOSPITALIST

## 2018-06-01 PROCEDURE — 85610 PROTHROMBIN TIME: CPT | Performed by: PHYSICIAN ASSISTANT

## 2018-06-01 PROCEDURE — 71045 X-RAY EXAM CHEST 1 VIEW: CPT

## 2018-06-01 RX ORDER — ONDANSETRON 4 MG/1
4 TABLET, ORALLY DISINTEGRATING ORAL EVERY 6 HOURS PRN
Status: DISCONTINUED | OUTPATIENT
Start: 2018-06-01 | End: 2018-06-02

## 2018-06-01 RX ORDER — ONDANSETRON 2 MG/ML
4 INJECTION INTRAMUSCULAR; INTRAVENOUS EVERY 30 MIN PRN
Status: DISCONTINUED | OUTPATIENT
Start: 2018-06-01 | End: 2018-06-01

## 2018-06-01 RX ORDER — LISINOPRIL 2.5 MG/1
2.5 TABLET ORAL DAILY
Status: DISCONTINUED | OUTPATIENT
Start: 2018-06-02 | End: 2018-06-02

## 2018-06-01 RX ORDER — BISACODYL 10 MG
10 SUPPOSITORY, RECTAL RECTAL DAILY PRN
Status: DISCONTINUED | OUTPATIENT
Start: 2018-06-01 | End: 2018-06-02

## 2018-06-01 RX ORDER — AMOXICILLIN 250 MG
2 CAPSULE ORAL 2 TIMES DAILY PRN
Status: DISCONTINUED | OUTPATIENT
Start: 2018-06-01 | End: 2018-06-02

## 2018-06-01 RX ORDER — HYDRALAZINE HYDROCHLORIDE 20 MG/ML
10 INJECTION INTRAMUSCULAR; INTRAVENOUS EVERY 4 HOURS PRN
Status: DISCONTINUED | OUTPATIENT
Start: 2018-06-01 | End: 2018-06-02

## 2018-06-01 RX ORDER — METOPROLOL SUCCINATE 25 MG/1
25 TABLET, EXTENDED RELEASE ORAL AT BEDTIME
Status: DISCONTINUED | OUTPATIENT
Start: 2018-06-01 | End: 2018-06-04 | Stop reason: HOSPADM

## 2018-06-01 RX ORDER — PROCHLORPERAZINE MALEATE 5 MG
10 TABLET ORAL EVERY 6 HOURS PRN
Status: DISCONTINUED | OUTPATIENT
Start: 2018-06-01 | End: 2018-06-02

## 2018-06-01 RX ORDER — AMOXICILLIN 250 MG
1 CAPSULE ORAL 2 TIMES DAILY PRN
Status: DISCONTINUED | OUTPATIENT
Start: 2018-06-01 | End: 2018-06-02

## 2018-06-01 RX ORDER — HYDROCODONE BITARTRATE AND ACETAMINOPHEN 5; 325 MG/1; MG/1
2 TABLET ORAL ONCE
Status: COMPLETED | OUTPATIENT
Start: 2018-06-01 | End: 2018-06-01

## 2018-06-01 RX ORDER — NALOXONE HYDROCHLORIDE 0.4 MG/ML
.1-.4 INJECTION, SOLUTION INTRAMUSCULAR; INTRAVENOUS; SUBCUTANEOUS
Status: DISCONTINUED | OUTPATIENT
Start: 2018-06-01 | End: 2018-06-02

## 2018-06-01 RX ORDER — HYDROMORPHONE HYDROCHLORIDE 1 MG/ML
0.5 INJECTION, SOLUTION INTRAMUSCULAR; INTRAVENOUS; SUBCUTANEOUS
Status: DISCONTINUED | OUTPATIENT
Start: 2018-06-01 | End: 2018-06-01

## 2018-06-01 RX ORDER — PROCHLORPERAZINE 25 MG
25 SUPPOSITORY, RECTAL RECTAL EVERY 12 HOURS PRN
Status: DISCONTINUED | OUTPATIENT
Start: 2018-06-01 | End: 2018-06-02

## 2018-06-01 RX ORDER — HYDROMORPHONE HYDROCHLORIDE 1 MG/ML
0.2 INJECTION, SOLUTION INTRAMUSCULAR; INTRAVENOUS; SUBCUTANEOUS
Status: DISCONTINUED | OUTPATIENT
Start: 2018-06-01 | End: 2018-06-02

## 2018-06-01 RX ORDER — SIMVASTATIN 40 MG
40 TABLET ORAL AT BEDTIME
Status: DISCONTINUED | OUTPATIENT
Start: 2018-06-01 | End: 2018-06-02

## 2018-06-01 RX ORDER — HYDROCODONE BITARTRATE AND ACETAMINOPHEN 5; 325 MG/1; MG/1
1-2 TABLET ORAL EVERY 4 HOURS PRN
Status: DISCONTINUED | OUTPATIENT
Start: 2018-06-01 | End: 2018-06-02

## 2018-06-01 RX ORDER — SODIUM CHLORIDE 9 MG/ML
1000 INJECTION, SOLUTION INTRAVENOUS CONTINUOUS
Status: DISCONTINUED | OUTPATIENT
Start: 2018-06-01 | End: 2018-06-01

## 2018-06-01 RX ORDER — ONDANSETRON 2 MG/ML
4 INJECTION INTRAMUSCULAR; INTRAVENOUS EVERY 6 HOURS PRN
Status: DISCONTINUED | OUTPATIENT
Start: 2018-06-01 | End: 2018-06-02

## 2018-06-01 RX ORDER — ACETAMINOPHEN 325 MG/1
650 TABLET ORAL EVERY 4 HOURS PRN
Status: DISCONTINUED | OUTPATIENT
Start: 2018-06-01 | End: 2018-06-02

## 2018-06-01 RX ADMIN — SIMVASTATIN 40 MG: 40 TABLET, FILM COATED ORAL at 21:02

## 2018-06-01 RX ADMIN — HYDROCODONE BITARTRATE AND ACETAMINOPHEN 1 TABLET: 5; 325 TABLET ORAL at 21:02

## 2018-06-01 RX ADMIN — HYDROCODONE BITARTRATE AND ACETAMINOPHEN 1 TABLET: 5; 325 TABLET ORAL at 12:57

## 2018-06-01 ASSESSMENT — ENCOUNTER SYMPTOMS
DYSURIA: 0
DIZZINESS: 0
COLOR CHANGE: 0
WOUND: 1
CHEST TIGHTNESS: 0
SHORTNESS OF BREATH: 0
ABDOMINAL PAIN: 0
LIGHT-HEADEDNESS: 0
ARTHRALGIAS: 1
CHILLS: 0

## 2018-06-01 NOTE — ED PROVIDER NOTES
Emergency Department Attending Supervision Note  6/1/2018  2:55 PM      I evaluated this patient in conjunction with DARCIE Alfred    This is a 62 year old woman, who just left her house with dogs and she fell, hurting her right hip. She suffered several abrasions to her arms, particularly the right. She did not hit her head or hurt her neck. She was able with assistance of a neighbor to get up and get back home. She cleaned her wounds, and then a friend brought her in. Her tetanus is up to date, and she is otherwise in good health. She is treated with Metoprolol for exercise induced v tach and did undergo PTCA for inferior infarct several years ago. On exam, she has several abrasions on her right elbow and her left palm. Her right hip is not tender to palpation,but with internal and external rotation of the leg it is uncomfortable. She is neurologically intact with a GCS of 15. Xray shows an incomplete fracture of the base of the femoral neck on the right side. The patient has been seen by orthopedics, and will be admitted for probable surgery tomorrow. Plan as noted.    Diagnosis    ICD-10-CM   1. Fracture of hip, right, closed, initial encounter (H) S72.001A   2. Multiple abrasions T07.XXXA     Scribe Disclosure:  I, Rabia Jaison, am serving as a scribe on 6/1/2018 at 3:42 PM to personally document services performed by Puneet Catherine MD based on my observations and the provider's statements to me.       Puneet Catherine MD Steinman, Randall Ira, MD  06/01/18 8445

## 2018-06-01 NOTE — ED NOTES
[facility]  ED Nurse Handoff Report    ED Chief complaint: Fall (pt tripped while walking dogs and is c/o right hip pain)      ED Diagnosis:   Final diagnoses:   None       Code Status: Full Code    Allergies:   Allergies   Allergen Reactions     Fosamax [Alendronic Acid] Palpitations       Activity level - Baseline/Home:  Independent    Activity Level - Current:   Bed rest but patient is mobile.      Needed?: No    Isolation: No  Infection: Not Applicable    Bariatric?: No    Vital Signs:   Vitals:    06/01/18 1416 06/01/18 1417 06/01/18 1430 06/01/18 1445   BP:       Pulse:       Resp:       Temp:       TempSrc:       SpO2: 100% 96% 100% 99%   Weight:       Height:           Cardiac Rhythm: ,        Pain level: 0-10 Pain Scale: 8    Is this patient confused?: No   Suicidality: Screened negative    Patient Report: Initial Complaint: Patient presents to ED from home after having a fall. Patient was walking dogs and leashes got caught up in another dogs leash and patient fell onto R side. States tried to walk on it but was dragging R leg behind her, then a friend brought her in.   Focused Assessment: patient has R hip pain. Also some abrasoins to R forearm, elbow and L hand.   Tests Performed: Xray, labs  Abnormal Results: R femoral neck fracture.   Treatments provided: ice, duran placed, no pain meds as of yet    Family Comments: None present    OBS brochure/video discussed/provided to patient: N/A    ED Medications:   Medications   0.9% sodium chloride BOLUS (not administered)     Followed by   sodium chloride 0.9% infusion (not administered)   ondansetron (ZOFRAN) injection 4 mg (not administered)   HYDROmorphone (PF) (DILAUDID) injection 0.5 mg (not administered)   HYDROcodone-acetaminophen (NORCO) 5-325 MG per tablet 2 tablet (1 tablet Oral Given 6/1/18 1257)       Drips infusing?:  Yes    For the majority of the shift this patient was Green.   Interventions performed were  none.    Severe Sepsis OR Septic Shock Diagnosis Present: No      ED NURSE PHONE NUMBER: [phone]

## 2018-06-01 NOTE — PHARMACY-ADMISSION MEDICATION HISTORY
Admission medication history interview status for the 6/1/2018  admission is complete. See EPIC admission navigator for prior to admission medications     Medication history source reliability:Good    Actions taken by pharmacist (provider contacted, etc):None     Additional medication history information not noted on PTA med list :None    Medication reconciliation/reorder completed by provider prior to medication history? No    Time spent in this activity: 7 min    Prior to Admission medications    Medication Sig Last Dose Taking? Auth Provider   aspirin 81 MG chewable tablet Take 81 mg by mouth daily. 6/1/2018 at Unknown time Yes Reported, Patient   calcium carb 1250 mg, 500 mg [place],/vitamin D 200 unit (OSCAL 500/200 D-3) 500-200 MG-UNIT per tablet Take 1 tablet by mouth every other day  6/1/2018 at Unknown time Yes Reported, Patient   lisinopril (PRINIVIL/ZESTRIL) 2.5 MG tablet Take 1 tablet (2.5 mg) by mouth daily 6/1/2018 at Unknown time Yes Ginny Baldwin DO   metoprolol (TOPROL-XL) 25 MG 24 hr tablet Take 1 tablet (25 mg) by mouth daily  Patient taking differently: Take 25 mg by mouth At Bedtime  5/31/2018 at pm Yes Terri Morrison, APRN CNP   Multiple vitamin TABS Take by mouth every other day  5/31/2018 at Unknown time Yes Reported, Patient   simvastatin (ZOCOR) 40 MG tablet Take 1 tablet (40 mg) by mouth At Bedtime 5/31/2018 at Unknown time Yes Ginny Baldwin DO   Denosumab (PROLIA SC) Inject Subcutaneous every 6 months As directed    Reported, Patient

## 2018-06-01 NOTE — H&P
Admitted:     06/01/2018      PRIMARY CARE PHYSICIAN:  Dr. Laura Handler.      CHIEF COMPLAINT:  Fall with right hip pain.      HISTORY OF PRESENT ILLNESS:  Ms. Robin Cazares is a pleasant 62-year-old female with a past medical history significant for hypertension, CAD, exercise-induced ventricular tachycardia who was brought to the ER today following a fall resulting in right hip pain.  She states she was walking her dogs when another dog crossed them and she was pulled by her dog, causing her to fall with immediate right hip pain.  She did not hit her head, did not pass out.  Denies fever, chills, or rigors.  No chest pain or shortness of breath.  Denies pain in the abdomen.  Reports a normal bowel and bladder habit.  In the ER, she was seen by Dr. Catherine.  X-ray was noted with mildly displaced right femoral neck fracture.  Orthopedics was contacted who plans to do OR tomorrow and hospitalist was requested admission for further evaluation.      Of note, in 09/2017 while undergoing a stress echocardiogram, she was noted to have a ventricular tachycardia, was evaluated by electrophysiologist who suggested this was due to right ventricular outflow tract related ventricular tachycardia.  She was put on metoprolol.  She denies any chest pain or recent decrease in effort tolerance.  Denies any palpitation.  She had not been on any beta blockers prior to that due to history of bradycardia.      REVIEW OF SYSTEMS:  A 10-point review of system was done and was negative apart from those mentioned in the history of present illness.      PAST MEDICAL HISTORY:   1.  Hypertension.   2.  Dyslipidemia.   3.  CAD with history of MI, status post angioplasty in 2008.   4.  History of nonsustained ventricular tachycardia, felt to be related to right ventricular outflow tract per EP.   5.  History of left distal radius fracture 01/2018 following a mechanical fall while she slipped on ice, status post conservative management.       MEDICATIONS PRIOR TO ADMISSION:  Prescriptions Prior to Admission   Medication Sig Dispense Refill Last Dose     aspirin 81 MG chewable tablet Take 81 mg by mouth daily.   6/1/2018 at Unknown time     calcium carb 1250 mg, 500 mg [place],/vitamin D 200 unit (OSCAL 500/200 D-3) 500-200 MG-UNIT per tablet Take 1 tablet by mouth every other day    6/1/2018 at Unknown time     lisinopril (PRINIVIL/ZESTRIL) 2.5 MG tablet Take 1 tablet (2.5 mg) by mouth daily 90 tablet 3 6/1/2018 at Unknown time     metoprolol (TOPROL-XL) 25 MG 24 hr tablet Take 1 tablet (25 mg) by mouth daily (Patient taking differently: Take 25 mg by mouth At Bedtime ) 90 tablet 3 5/31/2018 at pm     Multiple vitamin TABS Take by mouth every other day    5/31/2018 at Unknown time     simvastatin (ZOCOR) 40 MG tablet Take 1 tablet (40 mg) by mouth At Bedtime 90 tablet 3 5/31/2018 at Unknown time     Denosumab (PROLIA SC) Inject Subcutaneous every 6 months As directed    Taking         ALLERGIES:  FOSAMAX.      SOCIAL HISTORY:  She denies smoking, takes alcohol rarely.  No illicit drug use.      FAMILY HISTORY:  Reviewed and not pertinent to current presentation.      PHYSICAL EXAMINATION:   GENERAL:  The patient is conscious, alert, oriented x3, lying comfortably in bed in no apparent distress.   VITAL SIGNS:  Temperature 98.9, heart rate of 75, blood pressure 129/53, saturation 99% on room air.   HEENT:  Pupils are equal and reactive to light and accommodation.  Extraocular movements are intact.  Oral mucosa is moist.   NECK:  Supple, no raised JVD.   RESPIRATORY:  Lung sounds bilaterally clear to auscultation, no wheezes or crepitation.   CARDIOVASCULAR:  Normal S1-S2, regular rate and rhythm, no murmur.   ABDOMEN:  Soft, nontender, nondistended, no guarding, rigidity or rebound tenderness.   LOWER EXTREMITIES:  With no edema.  She has restricted right hip movement due to pain.    SKIN:  She has abrasions on her right elbow and left knee .       LABORATORY DATA:  CBC, BMP reviewed in Epic are mostly unremarkable.  WBC is 11.6, hemoglobin is 13.6.  Electrolytes are within normal limits.  Chest x-ray reviewed by me shows no acute infiltrate, effusion or pneumothorax.  X-ray pelvis reviewed shows mildly displaced right femoral neck fracture.  EKG is pending at this time.      ASSESSMENT AND PLAN:  Ms. Robin Cazares is a 62-year-old female with a past medical history significant for hypertension, dyslipidemia, CAD, history of exercise induced ventricular tachycardia who was brought to the ER with right hip pain following a mechanical fall and noted with a right femoral neck fracture.     1.  Mildly displaced right femoral neck fracture following a mechanical fall.  We will admit her as inpatient.  Orthopedics has evaluated the patient.  Plan for OR tomorrow.  We will keep her n.p.o. after midnight.  Will have p.r.n. pain medications.  Bed rest, fall precautions.  She does have a history of coronary artery disease and exercise-induced ventricular tachycardia but denies recent symptoms.  No recent decrease in effort tolerance.  We will obtain an EKG.  Monitor on telemetry.  She can proceed for the proposed surgery without further cardiac workup at this time if the EKG looks normal and she has no significant arrhythmias on telemetry.     2.  Coronary artery disease with history of myocardial infarction, status post angioplasty 2008.     3.  Exercise-induced ventricular tachycardia.  Plan as noted above.  She had a stress echocardiogram 09/2017, which showed no significant ischemia.  Denies recent cardiac symptoms.  We will continue with her metoprolol.  We will hold off on aspirin for the surgery with plans to resume after surgery.     4.  Hypertension.  Continue metoprolol and lisinopril when verified by Pharmacy.   5.  Dyslipidemia:  Resume simvastatin when verified.   6.  Deep venous thrombosis prophylaxis:  Mechanical with PCD boots.      CODE STATUS:   Full code.         STACY KOWALSKI MD             D: 2018   T: 2018   MT: HANK      Name:     RAS CASTILLO   MRN:      [id number]        Account:      [id number]   :      1955        Admitted:     2018                   Document: [id number]       cc: GABRIELLE GILLESPIE MD

## 2018-06-01 NOTE — PROGRESS NOTES
RECEIVING UNIT ED HANDOFF REVIEW    ED Nurse Handoff Report was reviewed by: Sarah Verdin on June 1, 2018 at 4:36 PM

## 2018-06-01 NOTE — CONSULTS
"[facility] Orthopedic Consultation    Robin Cazares MRN# [id number]   Age: 62 year old YOB: 1955     Date of Admission:  6/1/2018    Reason for consult: Right femur fracture       Requesting physician: Yonas MCKEON       Level of consult: Consult, follow and place orders           Assessment and Plan:   Assessment:   Right intertrochanteric femur fracture      Plan:   OR tomorrow time permitting  NPO at midnight  Normal diet today  Bed rest  Jara/bed pain pending patient's comfort  Pain medication as needed  Hold anticoagulation/ASA  Pre-op clearance by hospitalist           Chief Complaint:   Right femur fracture         History of Present Illness:   This patient is a 62 year old female who presents with the following condition requiring a hospital admission:    Mrs. Cazares states that she was walking her dogs this afternoon when one pulled on the leash to go for another dog, pulling her to the ground on her right hip. She was able to \"hobble\" out of the house and present to the ED. She has past history of CAD and MI/heart cath.           Past Medical History:     Past Medical History:   Diagnosis Date     CAD (coronary artery disease)     8/2008 angioplasty occluded distal OM branch     Hyperlipidemia      Internal hemorrhoids      MI (myocardial infarction)     2008     Osteopenia      Osteoporosis      Palpitations      S/P breast lumpectomy      Vaginal bleeding              Past Surgical History:     Past Surgical History:   Procedure Laterality Date     HEART CATH, ANGIOPLASTY      8/2008 angioplasty occluded distal OM branch             Social History:     Social History   Substance Use Topics     Smoking status: Never Smoker     Smokeless tobacco: Never Used     Alcohol use 0.0 oz/week     0 Standard drinks or equivalent per week      Comment: rare              Family History:     Family History   Problem Relation Age of Onset     HEART DISEASE Father      Leukemia Mother  "     HEART DISEASE Other              Immunizations:     VACCINE/DOSE   Diptheria   DPT   DTAP   HBIG   Hepatitis A   Hepatitis B   HIB   Influenza   Measles   Meningococcal   MMR   Mumps   Pneumococcal   Polio   Rubella   Small Pox   TDAP   Varicella   Zoster             Allergies:     Allergies   Allergen Reactions     Fosamax [Alendronic Acid] Palpitations             Medications:     Current Facility-Administered Medications   Medication     0.9% sodium chloride BOLUS    Followed by     sodium chloride 0.9% infusion     HYDROmorphone (PF) (DILAUDID) injection 0.5 mg     ondansetron (ZOFRAN) injection 4 mg     Current Outpatient Prescriptions   Medication Sig     aspirin 81 MG chewable tablet Take 81 mg by mouth daily.     calcium carb 1250 mg, 500 mg [place],/vitamin D 200 unit (OSCAL 500/200 D-3) 500-200 MG-UNIT per tablet Take 1 tablet by mouth every other day      lisinopril (PRINIVIL/ZESTRIL) 2.5 MG tablet Take 1 tablet (2.5 mg) by mouth daily     metoprolol (TOPROL-XL) 25 MG 24 hr tablet Take 1 tablet (25 mg) by mouth daily (Patient taking differently: Take 25 mg by mouth At Bedtime )     Multiple vitamin TABS Take by mouth every other day      simvastatin (ZOCOR) 40 MG tablet Take 1 tablet (40 mg) by mouth At Bedtime     Denosumab (PROLIA SC) Inject Subcutaneous every 6 months As directed      [DISCONTINUED] metoprolol succinate (TOPROL-XL) 25 MG 24 hr tablet Take 1 tablet (25 mg) by mouth daily             Review of Systems:   CV: NEGATIVE for chest pain, palpitations or peripheral edema  C: NEGATIVE for fever, chills, change in weight  E/M: NEGATIVE for ear, mouth and throat problems  R: NEGATIVE for significant cough or SOB          Physical Exam:   All vitals have been reviewed  Patient Vitals for the past 24 hrs:   BP Temp Temp src Pulse Heart Rate Resp SpO2 Height Weight   06/01/18 1445 - - - - - - 99 % - -   06/01/18 1430 - - - - - - 100 % - -   06/01/18 1417 - - - - - - 96 % - -   06/01/18 1416 - - - -  "- - 100 % - -   06/01/18 1233 129/53 98.9  F (37.2  C) Oral 75 75 12 100 % 1.702 m (5' 7\") 56.7 kg (125 lb)     No intake or output data in the 24 hours ending 06/01/18 1551    Patient laying comfortably in bed  Leg lengths appear equal  Minimal erythema of the surrounding skin. No skin tears or lacerations over hip.  Bilateral calves are soft, non-tender.  Bilateral lower extremity is NVI.  Sensation intact bilateral lower extremities  5/5 motor with resisted dorsi and plantar flexion bilaterally  +Dp pulse            Data:   All laboratory data reviewed  Results for orders placed or performed during the hospital encounter of 06/01/18   XR Pelvis w Hip Right 1 View    Narrative    XR PELVIS AND HIP RIGHT 1 VIEW 6/1/2018 2:03 PM    HISTORY: Fall.    COMPARISON: None.      Impression    IMPRESSION: Mildly displaced right femoral neck fracture.    BERRY SAAVEDRA MD   XR Chest 1 View    Narrative    XR CHEST 1 VW 6/1/2018 2:04 PM    HISTORY: Fracture.    COMPARISON: August 15, 2008.      Impression    IMPRESSION: The lungs are clear. No focal pulmonary opacities. Heart  and mediastinum are unremarkable. No acute cardiopulmonary  abnormalities.    BERRY SAAVEDRA MD   CBC with platelets differential   Result Value Ref Range    WBC 11.6 (H) 4.0 - 11.0 10e9/L    RBC Count 4.55 3.8 - 5.2 10e12/L    Hemoglobin 13.6 11.7 - 15.7 g/dL    Hematocrit 40.4 35.0 - 47.0 %    MCV 89 78 - 100 fl    MCH 29.9 26.5 - 33.0 pg    MCHC 33.7 31.5 - 36.5 g/dL    RDW 12.5 10.0 - 15.0 %    Platelet Count 201 150 - 450 10e9/L    Diff Method Automated Method     % Neutrophils 85.4 %    % Lymphocytes 6.4 %    % Monocytes 7.8 %    % Eosinophils 0.1 %    % Basophils 0.1 %    % Immature Granulocytes 0.2 %    Nucleated RBCs 0 0 /100    Absolute Neutrophil 9.9 (H) 1.6 - 8.3 10e9/L    Absolute Lymphocytes 0.7 (L) 0.8 - 5.3 10e9/L    Absolute Monocytes 0.9 0.0 - 1.3 10e9/L    Absolute Eosinophils 0.0 0.0 - 0.7 10e9/L    Absolute Basophils 0.0 0.0 - 0.2 " 10e9/L    Abs Immature Granulocytes 0.0 0 - 0.4 10e9/L    Absolute Nucleated RBC 0.0    Basic metabolic panel   Result Value Ref Range    Sodium 137 133 - 144 mmol/L    Potassium 4.3 3.4 - 5.3 mmol/L    Chloride 104 94 - 109 mmol/L    Carbon Dioxide 27 20 - 32 mmol/L    Anion Gap 6 3 - 14 mmol/L    Glucose 119 (H) 70 - 99 mg/dL    Urea Nitrogen 24 7 - 30 mg/dL    Creatinine 0.70 0.52 - 1.04 mg/dL    GFR Estimate 84 >60 mL/min/1.7m2    GFR Estimate If Black >90 >60 mL/min/1.7m2    Calcium 10.3 (H) 8.5 - 10.1 mg/dL   EKG 12-lead, tracing only   Result Value Ref Range    Interpretation ECG Click View Image link to view waveform and result           Attestation:  I have reviewed today's vital signs, notes, medications, labs and imaging with Dr. Zapata.  Amount of time performed on this consult: 25 minutes.    Stephanie Paul PA-C

## 2018-06-01 NOTE — ED PROVIDER NOTES
History     Chief Complaint:  Fall    HPI   Robin Cazares is a 62 year old female with a history of CAD and MI status post heart cath who presents to the ED after a mechanical fall. The patient reports she tripped while walking her dogs. She complains of right hip pain and multiple abrasions.  After her fall a neighbor who witnessed it helped her up to her feet and got her back into her house patient said that she was only able to bear a minimal amount of weight on her right leg.  In the ED she denies pain anywhere else other than her right hip.  CMS is intact distally in her right leg pedal pulses are palpable.  No other concerns or complaints today.    Allergies:  Fosamax    Medications:    Tylenol  Aspirin  Calcium carb  Prolia  Lisinopril  Metoprolol  Zocor  Multivitamins     Past Medical History:    CAD  MI  Hyperlipidemia  Internal hemorrhoids  Palpitations  Lumpectomy in breast  Vaginal bleeding  Osteoporosis  Osteopenia    Past Surgical History:    Heart cath (2008)    Family History:    Heart disease- father  Leukemia- mother    Social History:  Smoking Status: Never  Smokeless Tobacco: Never  Alcohol Use: Rarely  Marital Status:       Review of Systems   Constitutional: Negative for chills.   Respiratory: Negative for chest tightness and shortness of breath.    Cardiovascular: Negative for chest pain.   Gastrointestinal: Negative for abdominal pain.   Genitourinary: Negative for dysuria.   Musculoskeletal: Positive for arthralgias.        Rip pain   Skin: Positive for wound. Negative for color change and pallor.   Neurological: Negative for dizziness and light-headedness.   All other systems reviewed and are negative.        Physical Exam   First Vitals:  Patient Vitals for the past 24 hrs:   BP Temp Temp src Pulse Heart Rate Resp SpO2 Height Weight   06/01/18 1445 - - - - - - 99 % - -   06/01/18 1430 - - - - - - 100 % - -   06/01/18 1417 - - - - - - 96 % - -   06/01/18 1416 - - - - - - 100  "% - -   06/01/18 1233 129/53 98.9  F (37.2  C) Oral 75 75 12 100 % 1.702 m (5' 7\") 56.7 kg (125 lb)     Physical Exam    General: Well-nourished, no acute distress  R leg: CMS intact distally in right leg with palpable pedal pulses,  range of motion limited secondary to pain, pain with internal and exterernal rotation.   Head/neck: Atraumatic, full ROM in neck, no midline tenderness  ENT:  Moist mucus membranes  Respiratory:  No respiratory distress, no wheezes crackles or rails  CV: Normal rate, no murmurs, rubs or gallops  Skin: Multiple small abrasions on both arms. Warm, dry.  No rashes or petechiae  Musculoskeletal: Limited ROM, in R leg, otherwise full ROM in all extremities,  No midline spinal tenderness  Neuro: Alert and oriented to person/place/time, CN 2-12 intact, Light touch sensation intact throughout. Strength 5/5 in b/l upper extremities and L leg.   Psychiatric: Normal affect      Emergency Department Course     EKG:  Time: 1534  Vent. Rate 64 bpm. WY interval 162. QRS duration 92. QT/QTc 432/445. P-R-T axis 79 87 63.  Normal sinus rhythm. Nonspecific T wave abnormality. Abnormal ECG. Read time: 1535    Imaging:  Radiographic findings were communicated with the patient who voiced understanding of the findings.  XR Chest 1 view:   The lungs are clear. No focal pulmonary opacities. Heart and mediastinum are unremarkable. No acute cardiopulmonary abnormalities, as per radiology.     XR Pelvis w hip Right 1 view:   Mildly displaced right femoral neck fracture, as per radiology.       Laboratory:  CBC: WBC: 11.6 (H), HGB: 13.6, PLT: 201  BMP: Glucose 119 (H), Calcium 10.3 (H), o/w WNL (Creatinine: 0.70)  UA: WNL, w/o microscopic hematuria  INR: 1.03        Interventions:  Medications   0.9% sodium chloride BOLUS (not administered)     Followed by   sodium chloride 0.9% infusion (not administered)   ondansetron (ZOFRAN) injection 4 mg (not administered)   HYDROmorphone (PF) (DILAUDID) injection 0.5 mg (not " administered)   HYDROcodone-acetaminophen (NORCO) 5-325 MG per tablet 2 tablet (1 tablet Oral Given 6/1/18 1257)   1257 Norco 5-325 mg per tablet, 1 tablet PO      Emergency Department Course:  Nursing notes and vitals reviewed.  I performed an exam of the patient as documented above.         Impression & Plan    Medical Decision Making:    Robin Cazares is a 62 year old female who presents for evaluation of right hip pain after falling while walking her dogs. CMS is intact distally in the extremity.  Xrays reveal a mildly displaced fracture of the femoral neck of the right hip that will need orthopedic consultation and likely surgery.  The patients head to toe trauma exam is otherwise normal at this time and no further trauma workup is needed as I believe there is no signs of serious head, neck, chest, spinal, extremity or abdominal injuries.   Or so surgery was consulted from the emergency department and recommended admission for surgical intervention tomorrow.  Patient was discussed with hospitalist and is in agreement with this plan.  Patient's prove a pain improved in the ED with the interventions noted above.      Diagnosis:    ICD-10-CM   1. Fracture of hip, right, closed, initial encounter (H) S72.001A   2. Multiple abrasions T07.XXXA     Disposition:  The patient was admitted      6/1/2018    EMERGENCY DEPARTMENT       Yonas Mccartney PA-C  06/01/18 1627       Yonas Mccartney PA-C  06/01/18 1643

## 2018-06-01 NOTE — IP AVS SNAPSHOT
MRN:[id number]                      After Visit Summary   6/1/2018    Robin Cazares    MRN: [id number]           Thank you!     Thank you for choosing [facility] for your care. Our goal is always to provide you with excellent care. Hearing back from our patients is one way we can continue to improve our services. Please take a few minutes to complete the written survey that you may receive in the mail after you visit with us. Thank you!        Patient Information     Date Of Birth          1955        Designated Caregiver       Most Recent Value    Caregiver    Will someone help with your care after discharge? yes    Name of designated caregiver Sam Barthell    Phone number of caregiver [phone]    Caregiver address [place]      About your hospital stay     You were admitted on:  June 1, 2018 You last received care in the:  [place] [address] [facility]    You were discharged on:  June 4, 2018        Reason for your hospital stay       Following right hip fracture surgical fixation                  Who to Call     For medical emergencies, please call 911.  For non-urgent questions about your medical care, please call your primary care provider or clinic, [phone]  For questions related to your surgery, please call your surgery clinic        Attending Provider     Provider Specialty    Puneet Catherine MD Emergency Medicine    Eliazar Agustin MD Internal Medicine    [facility], Perico Brantley MD Orthopaedic Surgery       Primary Care Provider Office Phone # Fax #    Keya Sandra [phone]      After Care Instructions     Activity       Your activity upon discharge: activity as tolerated but no driving until off of daytime narcotics, and able to slam on brake pedal.            Diet       Follow this diet upon discharge: regular            Wound care and dressings       Instructions to care for your wound at home: Dry dressings to be  "applied daily. May get incisions wet in shower, but do not submerge under water until incisions are healed.                  Follow-up Appointments     Follow-up and recommended labs and tests        Follow up with Stefani Santana PA-C within 12-16 days from surgery.  If you do not already have a follow up appointment scheduled, please call our [place] office: [phone].  No follow up labs or test are needed.                  Further instructions from your care team       Hold your lisinopril until cleared by your primary care provider to resume    Pending Results     No orders found from 2018 to 2018.            Statement of Approval     Ordered          18 1303  I have reviewed and agree with all the recommendations and orders detailed in this document.  EFFECTIVE NOW     Approved and electronically signed by:  Stefani Santana PA-C             Admission Information     Date & Time Provider Department Dept. Phone    2018 Perico Zapata MD [place] [address] [facility] [phone]      Your Vitals Were     Blood Pressure Pulse Temperature Respirations Height Weight    116/65 (BP Location: Right arm) 65 98.2  F (36.8  C) (Oral) 16 1.702 m (5' 7\") 56.7 kg (125 lb)    Pulse Oximetry BMI (Body Mass Index)                97% 19.58 kg/m2          [facility] Information     [facility] lets you send messages to your doctor, view your test results, renew your prescriptions, schedule appointments and more. To sign up, go to [email or web address] . Click on \"Log in\" on the left side of the screen, which will take you to the Welcome page. Then click on \"Sign up Now\" on the right side of the page.     You will be asked to enter the access code listed below, as well as some personal information. Please follow the directions to create your username and password.     Your access code is: [email or web address]  Expires: 2018 10:20 AM     Your access code will  in 90 days. If you need " help or a new code, please call your [facility] clinic or [phone].        Care EveryWhere ID     This is your Care EveryWhere ID. This could be used by other organizations to access your [facility] medical records  [id number]        Equal Access to Services     DEON STOUT : Hadii aad ku hadarjunweston Sojenny, waantoinetteda luqadaha, qaybta kaalmada adetasha, susana migdaliain hayaaelaine robertolandy wong jass gamez. So [facility] [phone].    ATENCIÓN: Si habla español, tiene a bishop disposición servicios gratuitos de asistencia lingüística. Llame al [phone].    We comply with applicable federal civil rights laws and [place] laws. We do not discriminate on the basis of race, color, national origin, age, disability, sex, sexual orientation, or gender identity.               Review of your medicines      START taking        Dose / Directions    aspirin 325 MG EC tablet   Used for:  Fracture of hip, right, closed, initial encounter (H)   Replaces:  aspirin 81 MG chewable tablet   Notes to Patient:  Start 6/5/18        Take one Aspirin tab twice daily for 5 weeks.   Quantity:  70 tablet   Refills:  0       order for DME   Used for:  Fracture of hip, right, closed, initial encounter (H)        Equipment being ordered: Walker Wheels () and Walker () Treatment Diagnosis: Right hip fracture   Quantity:  1 each   Refills:  0       oxyCODONE-acetaminophen 5-325 MG per tablet   Commonly known as:  PERCOCET   Used for:  Fracture of hip, right, closed, initial encounter (H)        Dose:  1-2 tablet   Take 1-2 tablets by mouth every 4 hours as needed for severe pain   Quantity:  60 tablet   Refills:  0         CONTINUE these medicines which may have CHANGED, or have new prescriptions. If we are uncertain of the size of tablets/capsules you have at home, strength may be listed as something that might have changed.        Dose / Directions    metoprolol succinate 25 MG 24 hr tablet   Commonly known as:  TOPROL-XL   This may have changed:   when to take this   Used for:  Palpitations   Notes to Patient:  Resume at discharge        Dose:  25 mg   Take 1 tablet (25 mg) by mouth daily   Quantity:  90 tablet   Refills:  3         CONTINUE these medicines which have NOT CHANGED        Dose / Directions    Multiple vitamin Tabs   Notes to Patient:  Resume at discharge        Take by mouth every other day   Refills:  0       OSCAL 500/200 D-3 500-200 MG-UNIT per tablet   Generic drug:  calcium carb 1250 mg (500 mg [place])/vitamin D 200 unit   Notes to Patient:  Resume at discharge        Dose:  1 tablet   Take 1 tablet by mouth every other day   Refills:  0       PROLIA SC   Notes to Patient:  Resume at discharge        Inject Subcutaneous every 6 months As directed   Refills:  0       simvastatin 40 MG tablet   Commonly known as:  ZOCOR   Used for:  CAD (coronary artery disease), Inferior MI (H)   Notes to Patient:  Resume at discharge        Dose:  40 mg   Take 1 tablet (40 mg) by mouth At Bedtime   Quantity:  90 tablet   Refills:  3         STOP taking     aspirin 81 MG chewable tablet   Replaced by:  aspirin 325 MG EC tablet           lisinopril 2.5 MG tablet   Commonly known as:  PRINIVIL/Zestril                Where to get your medicines      These medications were sent to [facility] [place], [place] - [address]  [address], [place] [address]     Phone:  [phone]     aspirin 325 MG EC tablet         Some of these will need a paper prescription and others can be bought over the counter. Ask your nurse if you have questions.     Bring a paper prescription for each of these medications     order for DME    oxyCODONE-acetaminophen 5-325 MG per tablet                Protect others around you: Learn how to safely use, store and throw away your medicines at www.disposemymeds.org.        Information about OPIOIDS     PRESCRIPTION OPIOIDS: WHAT YOU NEED TO KNOW   You have a prescription for an opioid (narcotic) pain medicine.  Opioids can cause addiction. If you have a history of chemical dependency of any type, you are at a higher risk of becoming addicted to opioids. Only take this medicine after all other options have been tried. Take it for as short a time and as few doses as possible.     Do not:    Drive. If you drive while taking these medicines, you could be arrested for driving under the influence (DUI).    Operate heavy machinery    Do any other dangerous activities while taking these medicines.     Drink any alcohol while taking these medicines.      Take with any other medicines that contain acetaminophen. Read all labels carefully. Look for the word  acetaminophen  or  Tylenol.  Ask your pharmacist if you have questions or are unsure.    Store your pills in a secure place, locked if possible. We will not replace any lost or stolen medicine. If you don t finish your medicine, please throw away (dispose) as directed by your pharmacist. The [place] Pollution Control Agency has more information about safe disposal: https://www.pca.[facility].[place].us/living-green/managing-unwanted-medications    All opioids tend to cause constipation. Drink plenty of water and eat foods that have a lot of fiber, such as fruits, vegetables, prune juice, apple juice and high-fiber cereal. Take a laxative (Miralax, milk of magnesia, Colace, Senna) if you don t move your bowels at least every other day.              Medication List: This is a list of all your medications and when to take them. Check marks below indicate your daily home schedule. Keep this list as a reference.      Medications           Morning Afternoon Evening Bedtime As Needed    aspirin 325 MG EC tablet   Take one Aspirin tab twice daily for 5 weeks.   Notes to Patient:  Start 6/5/18                                      metoprolol succinate 25 MG 24 hr tablet   Commonly known as:  TOPROL-XL   Take 1 tablet (25 mg) by mouth daily   Last time this was given:  25 mg on 6/2/2018 10:34 AM    Notes to Patient:  Resume at discharge                                Multiple vitamin Tabs   Take by mouth every other day   Notes to Patient:  Resume at discharge                                order for DME   Equipment being ordered: Walker Wheels () and Walker () Treatment Diagnosis: Right hip fracture                                OSCAL 500/200 D-3 500-200 MG-UNIT per tablet   Take 1 tablet by mouth every other day   Generic drug:  calcium carb 1250 mg (500 mg [place])/vitamin D 200 unit   Notes to Patient:  Resume at discharge                                oxyCODONE-acetaminophen 5-325 MG per tablet   Commonly known as:  PERCOCET   Take 1-2 tablets by mouth every 4 hours as needed for severe pain                                   PROLIA SC   Inject Subcutaneous every 6 months As directed   Notes to Patient:  Resume at discharge                                simvastatin 40 MG tablet   Commonly known as:  ZOCOR   Take 1 tablet (40 mg) by mouth At Bedtime   Last time this was given:  40 mg on 6/1/2018  9:02 PM   Notes to Patient:  Resume at discharge

## 2018-06-01 NOTE — IP AVS SNAPSHOT
[address] [facility]    [address] [place] [address]    Phone:  [phone]                                       After Visit Summary   6/1/2018    Robin Cazares    MRN: [id number]           After Visit Summary Signature Page     I have received my discharge instructions, and my questions have been answered. I have discussed any challenges I see with this plan with the nurse or doctor.    ..........................................................................................................................................  Patient/Patient Representative Signature      ..........................................................................................................................................  Patient Representative Print Name and Relationship to Patient    ..................................................               ................................................  Date                                            Time    ..........................................................................................................................................  Reviewed by Signature/Title    ...................................................              ..............................................  Date                                                            Time

## 2018-06-02 ENCOUNTER — ANESTHESIA (OUTPATIENT)
Dept: SURGERY | Facility: CLINIC | Age: 63
End: 2018-06-02
Payer: COMMERCIAL

## 2018-06-02 ENCOUNTER — ANESTHESIA EVENT (OUTPATIENT)
Dept: SURGERY | Facility: CLINIC | Age: 63
End: 2018-06-02
Payer: COMMERCIAL

## 2018-06-02 ENCOUNTER — APPOINTMENT (OUTPATIENT)
Dept: GENERAL RADIOLOGY | Facility: CLINIC | Age: 63
End: 2018-06-02
Attending: ORTHOPAEDIC SURGERY
Payer: COMMERCIAL

## 2018-06-02 PROBLEM — S72.141A INTERTROCHANTERIC FRACTURE OF RIGHT FEMUR (H): Status: ACTIVE | Noted: 2018-06-02

## 2018-06-02 LAB
CREAT SERPL-MCNC: 0.7 MG/DL (ref 0.52–1.04)
GFR SERPL CREATININE-BSD FRML MDRD: 84 ML/MIN/1.7M2
INTERPRETATION ECG - MUSE: NORMAL
PLATELET # BLD AUTO: 126 10E9/L (ref 150–450)

## 2018-06-02 PROCEDURE — 85049 AUTOMATED PLATELET COUNT: CPT | Performed by: ORTHOPAEDIC SURGERY

## 2018-06-02 PROCEDURE — 36000065 ZZH SURGERY LEVEL 4 W FLUORO 1ST 30 MIN: Performed by: ORTHOPAEDIC SURGERY

## 2018-06-02 PROCEDURE — 37000008 ZZH ANESTHESIA TECHNICAL FEE, 1ST 30 MIN: Performed by: ORTHOPAEDIC SURGERY

## 2018-06-02 PROCEDURE — 25000132 ZZH RX MED GY IP 250 OP 250 PS 637: Performed by: HOSPITALIST

## 2018-06-02 PROCEDURE — C1713 ANCHOR/SCREW BN/BN,TIS/BN: HCPCS | Performed by: ORTHOPAEDIC SURGERY

## 2018-06-02 PROCEDURE — 36415 COLL VENOUS BLD VENIPUNCTURE: CPT | Performed by: ORTHOPAEDIC SURGERY

## 2018-06-02 PROCEDURE — 25000128 H RX IP 250 OP 636: Performed by: NURSE ANESTHETIST, CERTIFIED REGISTERED

## 2018-06-02 PROCEDURE — 25000132 ZZH RX MED GY IP 250 OP 250 PS 637: Performed by: ORTHOPAEDIC SURGERY

## 2018-06-02 PROCEDURE — 0QS606Z REPOSITION RIGHT UPPER FEMUR WITH INTRAMEDULLARY INTERNAL FIXATION DEVICE, OPEN APPROACH: ICD-10-PCS | Performed by: ORTHOPAEDIC SURGERY

## 2018-06-02 PROCEDURE — 37000009 ZZH ANESTHESIA TECHNICAL FEE, EACH ADDTL 15 MIN: Performed by: ORTHOPAEDIC SURGERY

## 2018-06-02 PROCEDURE — P9041 ALBUMIN (HUMAN),5%, 50ML: HCPCS | Performed by: NURSE ANESTHETIST, CERTIFIED REGISTERED

## 2018-06-02 PROCEDURE — 40000277 XR SURGERY CARM FLUORO LESS THAN 5 MIN W STILLS

## 2018-06-02 PROCEDURE — 82565 ASSAY OF CREATININE: CPT | Performed by: ORTHOPAEDIC SURGERY

## 2018-06-02 PROCEDURE — 27210995 ZZH RX 272: Performed by: ORTHOPAEDIC SURGERY

## 2018-06-02 PROCEDURE — 71000013 ZZH RECOVERY PHASE 1 LEVEL 1 EA ADDTL HR: Performed by: ORTHOPAEDIC SURGERY

## 2018-06-02 PROCEDURE — 40000170 ZZH STATISTIC PRE-PROCEDURE ASSESSMENT II: Performed by: ORTHOPAEDIC SURGERY

## 2018-06-02 PROCEDURE — 27211024 ZZHC OR SUPPLY OTHER OPNP: Performed by: ORTHOPAEDIC SURGERY

## 2018-06-02 PROCEDURE — 25000128 H RX IP 250 OP 636: Performed by: ORTHOPAEDIC SURGERY

## 2018-06-02 PROCEDURE — 25000125 ZZHC RX 250: Performed by: NURSE ANESTHETIST, CERTIFIED REGISTERED

## 2018-06-02 PROCEDURE — 99232 SBSQ HOSP IP/OBS MODERATE 35: CPT | Performed by: INTERNAL MEDICINE

## 2018-06-02 PROCEDURE — 36000063 ZZH SURGERY LEVEL 4 EA 15 ADDTL MIN: Performed by: ORTHOPAEDIC SURGERY

## 2018-06-02 PROCEDURE — 25000128 H RX IP 250 OP 636: Performed by: ANESTHESIOLOGY

## 2018-06-02 PROCEDURE — 27210794 ZZH OR GENERAL SUPPLY STERILE: Performed by: ORTHOPAEDIC SURGERY

## 2018-06-02 PROCEDURE — 12000007 ZZH R&B INTERMEDIATE

## 2018-06-02 PROCEDURE — 71000012 ZZH RECOVERY PHASE 1 LEVEL 1 FIRST HR: Performed by: ORTHOPAEDIC SURGERY

## 2018-06-02 DEVICE — IMPLANTABLE DEVICE: Type: IMPLANTABLE DEVICE | Site: HIP | Status: FUNCTIONAL

## 2018-06-02 RX ORDER — ACETAMINOPHEN 325 MG/1
650 TABLET ORAL EVERY 4 HOURS PRN
Status: DISCONTINUED | OUTPATIENT
Start: 2018-06-05 | End: 2018-06-04 | Stop reason: HOSPADM

## 2018-06-02 RX ORDER — SODIUM CHLORIDE 9 MG/ML
INJECTION, SOLUTION INTRAVENOUS CONTINUOUS
Status: DISCONTINUED | OUTPATIENT
Start: 2018-06-02 | End: 2018-06-03

## 2018-06-02 RX ORDER — ONDANSETRON 4 MG/1
4 TABLET, ORALLY DISINTEGRATING ORAL EVERY 6 HOURS PRN
Status: DISCONTINUED | OUTPATIENT
Start: 2018-06-02 | End: 2018-06-04 | Stop reason: HOSPADM

## 2018-06-02 RX ORDER — HYDROMORPHONE HYDROCHLORIDE 1 MG/ML
.3-.5 INJECTION, SOLUTION INTRAMUSCULAR; INTRAVENOUS; SUBCUTANEOUS
Status: DISCONTINUED | OUTPATIENT
Start: 2018-06-02 | End: 2018-06-04 | Stop reason: HOSPADM

## 2018-06-02 RX ORDER — HYDROXYZINE HYDROCHLORIDE 25 MG/1
25 TABLET, FILM COATED ORAL 3 TIMES DAILY PRN
Status: DISCONTINUED | OUTPATIENT
Start: 2018-06-02 | End: 2018-06-04 | Stop reason: HOSPADM

## 2018-06-02 RX ORDER — ALBUTEROL SULFATE 0.83 MG/ML
2.5 SOLUTION RESPIRATORY (INHALATION) EVERY 4 HOURS PRN
Status: DISCONTINUED | OUTPATIENT
Start: 2018-06-02 | End: 2018-06-02 | Stop reason: HOSPADM

## 2018-06-02 RX ORDER — HYDROMORPHONE HYDROCHLORIDE 1 MG/ML
.3-.5 INJECTION, SOLUTION INTRAMUSCULAR; INTRAVENOUS; SUBCUTANEOUS EVERY 5 MIN PRN
Status: DISCONTINUED | OUTPATIENT
Start: 2018-06-02 | End: 2018-06-02 | Stop reason: HOSPADM

## 2018-06-02 RX ORDER — ONDANSETRON 2 MG/ML
4 INJECTION INTRAMUSCULAR; INTRAVENOUS EVERY 6 HOURS PRN
Status: DISCONTINUED | OUTPATIENT
Start: 2018-06-02 | End: 2018-06-04 | Stop reason: HOSPADM

## 2018-06-02 RX ORDER — PROCHLORPERAZINE MALEATE 5 MG
10 TABLET ORAL EVERY 6 HOURS PRN
Status: DISCONTINUED | OUTPATIENT
Start: 2018-06-02 | End: 2018-06-04 | Stop reason: HOSPADM

## 2018-06-02 RX ORDER — SODIUM CHLORIDE, SODIUM LACTATE, POTASSIUM CHLORIDE, CALCIUM CHLORIDE 600; 310; 30; 20 MG/100ML; MG/100ML; MG/100ML; MG/100ML
INJECTION, SOLUTION INTRAVENOUS CONTINUOUS
Status: DISCONTINUED | OUTPATIENT
Start: 2018-06-02 | End: 2018-06-02 | Stop reason: HOSPADM

## 2018-06-02 RX ORDER — LORAZEPAM 2 MG/ML
.5-1 INJECTION INTRAMUSCULAR
Status: DISCONTINUED | OUTPATIENT
Start: 2018-06-02 | End: 2018-06-02 | Stop reason: HOSPADM

## 2018-06-02 RX ORDER — CEFAZOLIN SODIUM 1 G/3ML
1 INJECTION, POWDER, FOR SOLUTION INTRAMUSCULAR; INTRAVENOUS EVERY 8 HOURS
Status: COMPLETED | OUTPATIENT
Start: 2018-06-02 | End: 2018-06-03

## 2018-06-02 RX ORDER — CEFAZOLIN SODIUM 2 G/100ML
2 INJECTION, SOLUTION INTRAVENOUS
Status: DISCONTINUED | OUTPATIENT
Start: 2018-06-02 | End: 2018-06-02 | Stop reason: HOSPADM

## 2018-06-02 RX ORDER — AMOXICILLIN 250 MG
1 CAPSULE ORAL 2 TIMES DAILY
Status: DISCONTINUED | OUTPATIENT
Start: 2018-06-02 | End: 2018-06-04 | Stop reason: HOSPADM

## 2018-06-02 RX ORDER — AMOXICILLIN 250 MG
2 CAPSULE ORAL 2 TIMES DAILY
Status: DISCONTINUED | OUTPATIENT
Start: 2018-06-02 | End: 2018-06-04 | Stop reason: HOSPADM

## 2018-06-02 RX ORDER — ONDANSETRON 4 MG/1
4 TABLET, ORALLY DISINTEGRATING ORAL EVERY 30 MIN PRN
Status: DISCONTINUED | OUTPATIENT
Start: 2018-06-02 | End: 2018-06-02 | Stop reason: HOSPADM

## 2018-06-02 RX ORDER — ONDANSETRON 2 MG/ML
4 INJECTION INTRAMUSCULAR; INTRAVENOUS EVERY 30 MIN PRN
Status: DISCONTINUED | OUTPATIENT
Start: 2018-06-02 | End: 2018-06-02 | Stop reason: HOSPADM

## 2018-06-02 RX ORDER — CEFAZOLIN SODIUM 1 G/3ML
1 INJECTION, POWDER, FOR SOLUTION INTRAMUSCULAR; INTRAVENOUS SEE ADMIN INSTRUCTIONS
Status: DISCONTINUED | OUTPATIENT
Start: 2018-06-02 | End: 2018-06-02 | Stop reason: HOSPADM

## 2018-06-02 RX ORDER — BUPIVACAINE HYDROCHLORIDE 5 MG/ML
INJECTION, SOLUTION EPIDURAL; INTRACAUDAL PRN
Status: DISCONTINUED | OUTPATIENT
Start: 2018-06-02 | End: 2018-06-02

## 2018-06-02 RX ORDER — HYDROXYZINE HYDROCHLORIDE 25 MG/1
25 TABLET, FILM COATED ORAL EVERY 6 HOURS PRN
Status: DISCONTINUED | OUTPATIENT
Start: 2018-06-02 | End: 2018-06-04 | Stop reason: HOSPADM

## 2018-06-02 RX ORDER — FENTANYL CITRATE 50 UG/ML
INJECTION, SOLUTION INTRAMUSCULAR; INTRAVENOUS PRN
Status: DISCONTINUED | OUTPATIENT
Start: 2018-06-02 | End: 2018-06-02

## 2018-06-02 RX ORDER — LIDOCAINE 40 MG/G
CREAM TOPICAL
Status: DISCONTINUED | OUTPATIENT
Start: 2018-06-02 | End: 2018-06-02 | Stop reason: HOSPADM

## 2018-06-02 RX ORDER — LIDOCAINE 40 MG/G
CREAM TOPICAL
Status: DISCONTINUED | OUTPATIENT
Start: 2018-06-02 | End: 2018-06-04 | Stop reason: HOSPADM

## 2018-06-02 RX ORDER — MEPERIDINE HYDROCHLORIDE 25 MG/ML
12.5 INJECTION INTRAMUSCULAR; INTRAVENOUS; SUBCUTANEOUS EVERY 5 MIN PRN
Status: DISCONTINUED | OUTPATIENT
Start: 2018-06-02 | End: 2018-06-02 | Stop reason: HOSPADM

## 2018-06-02 RX ORDER — ALBUTEROL SULFATE 0.83 MG/ML
2.5 SOLUTION RESPIRATORY (INHALATION)
Status: DISCONTINUED | OUTPATIENT
Start: 2018-06-02 | End: 2018-06-02 | Stop reason: HOSPADM

## 2018-06-02 RX ORDER — ACETAMINOPHEN 325 MG/1
975 TABLET ORAL EVERY 8 HOURS
Status: DISCONTINUED | OUTPATIENT
Start: 2018-06-02 | End: 2018-06-04 | Stop reason: HOSPADM

## 2018-06-02 RX ORDER — PROPOFOL 10 MG/ML
INJECTION, EMULSION INTRAVENOUS CONTINUOUS PRN
Status: DISCONTINUED | OUTPATIENT
Start: 2018-06-02 | End: 2018-06-02

## 2018-06-02 RX ORDER — EPHEDRINE SULFATE 50 MG/ML
INJECTION, SOLUTION INTRAMUSCULAR; INTRAVENOUS; SUBCUTANEOUS PRN
Status: DISCONTINUED | OUTPATIENT
Start: 2018-06-02 | End: 2018-06-02

## 2018-06-02 RX ORDER — ALBUMIN, HUMAN INJ 5% 5 %
SOLUTION INTRAVENOUS CONTINUOUS PRN
Status: DISCONTINUED | OUTPATIENT
Start: 2018-06-02 | End: 2018-06-02

## 2018-06-02 RX ORDER — MAGNESIUM HYDROXIDE 1200 MG/15ML
LIQUID ORAL PRN
Status: DISCONTINUED | OUTPATIENT
Start: 2018-06-02 | End: 2018-06-02 | Stop reason: HOSPADM

## 2018-06-02 RX ORDER — FENTANYL CITRATE 50 UG/ML
25-50 INJECTION, SOLUTION INTRAMUSCULAR; INTRAVENOUS
Status: DISCONTINUED | OUTPATIENT
Start: 2018-06-02 | End: 2018-06-02 | Stop reason: HOSPADM

## 2018-06-02 RX ORDER — NALOXONE HYDROCHLORIDE 0.4 MG/ML
.1-.4 INJECTION, SOLUTION INTRAMUSCULAR; INTRAVENOUS; SUBCUTANEOUS
Status: ACTIVE | OUTPATIENT
Start: 2018-06-02 | End: 2018-06-03

## 2018-06-02 RX ORDER — KETOROLAC TROMETHAMINE 15 MG/ML
15 INJECTION, SOLUTION INTRAMUSCULAR; INTRAVENOUS
Status: DISCONTINUED | OUTPATIENT
Start: 2018-06-02 | End: 2018-06-02 | Stop reason: HOSPADM

## 2018-06-02 RX ORDER — GLYCOPYRROLATE 0.2 MG/ML
INJECTION, SOLUTION INTRAMUSCULAR; INTRAVENOUS PRN
Status: DISCONTINUED | OUTPATIENT
Start: 2018-06-02 | End: 2018-06-02

## 2018-06-02 RX ORDER — OXYCODONE HYDROCHLORIDE 5 MG/1
5-10 TABLET ORAL
Status: DISCONTINUED | OUTPATIENT
Start: 2018-06-02 | End: 2018-06-04 | Stop reason: HOSPADM

## 2018-06-02 RX ORDER — NALOXONE HYDROCHLORIDE 0.4 MG/ML
.1-.4 INJECTION, SOLUTION INTRAMUSCULAR; INTRAVENOUS; SUBCUTANEOUS
Status: DISCONTINUED | OUTPATIENT
Start: 2018-06-02 | End: 2018-06-02

## 2018-06-02 RX ORDER — CELECOXIB 200 MG/1
200 CAPSULE ORAL 2 TIMES DAILY
Status: COMPLETED | OUTPATIENT
Start: 2018-06-02 | End: 2018-06-04

## 2018-06-02 RX ORDER — ZOLPIDEM TARTRATE 5 MG/1
5 TABLET ORAL
Status: DISCONTINUED | OUTPATIENT
Start: 2018-06-03 | End: 2018-06-04 | Stop reason: HOSPADM

## 2018-06-02 RX ORDER — ONDANSETRON 2 MG/ML
4 INJECTION INTRAMUSCULAR; INTRAVENOUS EVERY 6 HOURS
Status: DISPENSED | OUTPATIENT
Start: 2018-06-02 | End: 2018-06-03

## 2018-06-02 RX ADMIN — PHENYLEPHRINE HYDROCHLORIDE 100 MCG: 10 INJECTION, SOLUTION INTRAMUSCULAR; INTRAVENOUS; SUBCUTANEOUS at 12:26

## 2018-06-02 RX ADMIN — HYDROCODONE BITARTRATE AND ACETAMINOPHEN 1 TABLET: 5; 325 TABLET ORAL at 01:19

## 2018-06-02 RX ADMIN — PHENYLEPHRINE HYDROCHLORIDE 100 MCG: 10 INJECTION, SOLUTION INTRAMUSCULAR; INTRAVENOUS; SUBCUTANEOUS at 12:03

## 2018-06-02 RX ADMIN — ACETAMINOPHEN 975 MG: 325 TABLET, FILM COATED ORAL at 17:12

## 2018-06-02 RX ADMIN — FENTANYL CITRATE 50 MCG: 50 INJECTION, SOLUTION INTRAMUSCULAR; INTRAVENOUS at 11:33

## 2018-06-02 RX ADMIN — MIDAZOLAM 2 MG: 1 INJECTION INTRAMUSCULAR; INTRAVENOUS at 11:33

## 2018-06-02 RX ADMIN — PHENYLEPHRINE HYDROCHLORIDE 0.3 MCG/KG/MIN: 10 INJECTION, SOLUTION INTRAMUSCULAR; INTRAVENOUS; SUBCUTANEOUS at 12:03

## 2018-06-02 RX ADMIN — SENNOSIDES AND DOCUSATE SODIUM 1 TABLET: 8.6; 5 TABLET ORAL at 20:34

## 2018-06-02 RX ADMIN — RANITIDINE 150 MG: 150 TABLET ORAL at 20:34

## 2018-06-02 RX ADMIN — ONDANSETRON 4 MG: 2 INJECTION INTRAMUSCULAR; INTRAVENOUS at 15:53

## 2018-06-02 RX ADMIN — ALBUMIN (HUMAN): 12.5 SOLUTION INTRAVENOUS at 12:18

## 2018-06-02 RX ADMIN — Medication 5 MG: at 11:55

## 2018-06-02 RX ADMIN — OXYCODONE HYDROCHLORIDE 5 MG: 5 TABLET ORAL at 18:57

## 2018-06-02 RX ADMIN — GLYCOPYRROLATE 0.2 MG: 0.2 INJECTION, SOLUTION INTRAMUSCULAR; INTRAVENOUS at 11:48

## 2018-06-02 RX ADMIN — CEFAZOLIN SODIUM 1 G: 1 INJECTION, POWDER, FOR SOLUTION INTRAMUSCULAR; INTRAVENOUS at 20:34

## 2018-06-02 RX ADMIN — SODIUM CHLORIDE: 9 INJECTION, SOLUTION INTRAVENOUS at 17:09

## 2018-06-02 RX ADMIN — PHENYLEPHRINE HYDROCHLORIDE 100 MCG: 10 INJECTION, SOLUTION INTRAMUSCULAR; INTRAVENOUS; SUBCUTANEOUS at 12:16

## 2018-06-02 RX ADMIN — PHENYLEPHRINE HYDROCHLORIDE 100 MCG: 10 INJECTION, SOLUTION INTRAMUSCULAR; INTRAVENOUS; SUBCUTANEOUS at 11:55

## 2018-06-02 RX ADMIN — PHENYLEPHRINE HYDROCHLORIDE 100 MCG: 10 INJECTION, SOLUTION INTRAMUSCULAR; INTRAVENOUS; SUBCUTANEOUS at 11:49

## 2018-06-02 RX ADMIN — HYDROMORPHONE HYDROCHLORIDE 0.3 MG: 1 INJECTION, SOLUTION INTRAMUSCULAR; INTRAVENOUS; SUBCUTANEOUS at 20:33

## 2018-06-02 RX ADMIN — CELECOXIB 200 MG: 200 CAPSULE ORAL at 20:34

## 2018-06-02 RX ADMIN — METOPROLOL SUCCINATE 25 MG: 25 TABLET, EXTENDED RELEASE ORAL at 10:34

## 2018-06-02 RX ADMIN — SODIUM CHLORIDE, POTASSIUM CHLORIDE, SODIUM LACTATE AND CALCIUM CHLORIDE: 600; 310; 30; 20 INJECTION, SOLUTION INTRAVENOUS at 10:51

## 2018-06-02 RX ADMIN — Medication 10 MG: at 11:48

## 2018-06-02 RX ADMIN — ONDANSETRON 4 MG: 2 INJECTION INTRAMUSCULAR; INTRAVENOUS at 20:34

## 2018-06-02 RX ADMIN — SODIUM CHLORIDE 1000 ML: 9 INJECTION, SOLUTION INTRAVENOUS at 14:57

## 2018-06-02 RX ADMIN — PROPOFOL 100 MCG/KG/MIN: 10 INJECTION, EMULSION INTRAVENOUS at 11:46

## 2018-06-02 RX ADMIN — BUPIVACAINE HYDROCHLORIDE 12 MG: 5 INJECTION, SOLUTION EPIDURAL; INTRACAUDAL at 11:41

## 2018-06-02 RX ADMIN — HYDROCODONE BITARTRATE AND ACETAMINOPHEN 1 TABLET: 5; 325 TABLET ORAL at 06:35

## 2018-06-02 ASSESSMENT — ENCOUNTER SYMPTOMS: DYSRHYTHMIAS: 1

## 2018-06-02 NOTE — PLAN OF CARE
Problem: Patient Care Overview  Goal: Plan of Care/Patient Progress Review  Outcome: No Change  Bedrest this shift. Jara present. Stating pain is minimal and declined the need for pain medication. Planning surgery for right hip fracture on Saturday afternoon.

## 2018-06-02 NOTE — PLAN OF CARE
Problem: Patient Care Overview  Goal: Plan of Care/Patient Progress Review  Outcome: Improving  (2317-4820): A/O x4. VSS on RA. Tele: NSR. Not OOB since surgery; WB as tolerated. C/o right hip pain, given scheduled tylenol and ice. Given scheduled zofran for nausea. CMS intact. Clear liquids advance as tolerated, not much of an appetite but taking some PO. Continue IVF at 100 cc/hr. Jara patent, adequate UOP. Nursing will continue to monitor.

## 2018-06-02 NOTE — PROGRESS NOTES
[facility]    Hospitalist Progress Note    Date of Service (when I saw the patient): 06/02/2018    Assessment & Plan   Robin Cazares is a 62 year old female with hx of CAD s/p PCI and HTN who was admitted on 6/1/2018 acute right hip fracture following a fall.    Acute right femoral neck fracture following fall  Fell onto her right hip while walking her dog. XR pelvis/R hip on arrival showed mildly displaced right femoral neck fracture. Ortho was consulted on admission  - Plan for surgery today  - Post-op cares and VTE prophylaxis as per Ortho    CAD, native heart, native vessels s/p PCI  Essential hypertension  - Hold home aspirin until cleared by Ortho  - Continues on PTA metoprolol with hold parameters  - Hold lisinopril and simvastatin for now    # Pain Assessment:  Current Pain Score 6/2/2018   Patient currently in pain? yes   Pain score (0-10) 3   Pain location -   Pain descriptors -   - Robin is experiencing pain due to right hip fracture. Pain management was discussed and the plan was created in a collaborative fashion.  Robin's response to the current recommendations: engaged  - Opioid regimen: Norco #1-2 q4h prn, Dilaudid 0.2 mg q2h prn  - Response to opioid medications: Reduction of symptoms  - Bowel regimen: not needed  - Pharmacologic adjuvants: Acetaminophen    DVT Prophylaxis: Pneumatic Compression Devices  Code Status: Full Code    Disposition: Expected discharge once cleared by Ortho, in the next 2 days    Reny Winkler    Interval History   No events overnight. Pain adequately controlled. Denies cp/sob or nausea.   - Surgery today  - BP borderline. Hold lisinopril    -Data reviewed today: I reviewed all new labs and imaging results over the last 24 hours. I personally reviewed no images or EKG's today.    Physical Exam   Temp: 98.1  F (36.7  C) Temp src: Oral BP: 102/57 Pulse: 54 Heart Rate: 61 Resp: 16 SpO2: 98 % O2 Device: None (Room air)    Vitals:    06/01/18 1233    Weight: 56.7 kg (125 lb)     Vital Signs with Ranges  Temp:  [98.1  F (36.7  C)-98.9  F (37.2  C)] 98.1  F (36.7  C)  Pulse:  [54-75] 54  Heart Rate:  [52-75] 61  Resp:  [12-16] 16  BP: ()/(48-61) 102/57  SpO2:  [95 %-100 %] 98 %  I/O last 3 completed shifts:  In: 200 [P.O.:200]  Out: 2100 [Urine:2100]    Constitutional: Appears comfortable, NAD  Respiratory: Breathing non-labored. Lungs CTA anteriorly  Cardiovascular: Heart RRR, no m/r/g. No pedal edema  GI: +BS, abd soft/NT  Skin/Integumen: No rash  Neuro: Alert and appropriate, LO  Psych: Calm and cooperative    Medications       metoprolol succinate  25 mg Oral At Bedtime     Data     Recent Labs  Lab 06/01/18  1415   WBC 11.6*   HGB 13.6   MCV 89      INR 1.03      POTASSIUM 4.3   CHLORIDE 104   CO2 27   BUN 24   CR 0.70   ANIONGAP 6   HEIDI 10.3*   *       Recent Results (from the past 24 hour(s))   XR Pelvis w Hip Right 1 View    Narrative    XR PELVIS AND HIP RIGHT 1 VIEW 6/1/2018 2:03 PM    HISTORY: Fall.    COMPARISON: None.      Impression    IMPRESSION: Mildly displaced right femoral neck fracture.    BERRY SAAVEDRA MD   XR Chest 1 View    Narrative    XR CHEST 1 VW 6/1/2018 2:04 PM    HISTORY: Fracture.    COMPARISON: August 15, 2008.      Impression    IMPRESSION: The lungs are clear. No focal pulmonary opacities. Heart  and mediastinum are unremarkable. No acute cardiopulmonary  abnormalities.    BERRY SAAVEDRA MD

## 2018-06-02 NOTE — PLAN OF CARE
Problem: Surgery Nonspecified (Adult)  Goal: Signs and Symptoms of Listed Potential Problems Will be Absent, Minimized or Managed (Surgery Nonspecified)  Signs and symptoms of listed potential problems will be absent, minimized or managed by discharge/transition of care (reference Surgery Nonspecified (Adult) CPG).  Outcome: No Change  Pt for ORIF today.  Jara in place with adequate output.  Norco for pain.

## 2018-06-02 NOTE — OP NOTE
Procedure Date: 06/02/2018      DATE OF SERVICE:  06/02/2018      PREOPERATIVE DIAGNOSIS:  Right intertrochanteric femur fracture.      POSTOPERATIVE DIAGNOSIS:  Right intertrochanteric femur fracture.      PROCEDURE:  Closed reduction intramedullary nailing right intertrochanteric femur fracture.      SURGEON:  Silverio Zapata MD.      ASSISTANT:  Stefani Santana PA-C      INDICATIONS:  Ms. Cazares is a very pleasant 62-year-old female who has had ongoing right hip pain since a fall yesterday.  She has been having a difficult time ambulating.  [facility] was [facility] to [facility].  X-rays revealed a nondisplaced right intertrochanteric femur fracture.  We had a long discussion of the treatment options.  Given her age, activity level and nature of this injury, I think she would benefit from closed reduction intramedullary nailing of this femur fracture.  She understands the risks, benefits and alternatives and was happy with this plan of care.      NARRATIVE EVENTS:  After evaluation and proper identification of the patient be operated on, Ms. Cazares was taken to the operating table and spinal anesthetic and she was placed supine on the fracture table and her right leg was prepped and draped in the usual sterile manner.  After appropriate surgical pause to confirm the patient's extremity to be operated on, and after she recieved  2 grams of Ancef, her right leg was reduced under biplanar fluoroscopy.  We then made a small incision just in line with the femur just proximal to the greater trochanter.  Skin and soft sharply took down in the tensor fascia and split lateral thigh in line with the femur taken down allowing us access down to the tip of to greater trochanter.  Here we placed our guide pin, placed our pin into the tip of the greater trochanter in the AP view of the femur and assessed it was centered in the canal with the canal of the femur on lateral view of the femur.  We over reamed this with  initial entry reamer, then passed a guidewire down the femur and into position.  We measured this measured to fit a size 360 mm Synthes trochanteric femoral nail.  We overreamed this with a 13 mm reamer to place an 11 mm diameter nail.  At that point, we then placed an 11 mm in diameter x 360 mm in length Synthes trochanteric femoral nail passed this into position.  Once this was in position, we made a small nick incision laterally along the femur and using the outrigger guide placed our guide pin through the nail and across the fracture such that it was centered in the subcortical bone of the femoral head on both the AP and lateral views of the femur.  We measured this and measured to fit a 95 mm screw.  We overreamed this and placed a 95 mm diameter trochanteric lag screw. Once screw was in position, we locked it into position with the set screw.  We compressed the fracture slightly and then removed the outrigger aiming arm, we thoroughly irrigated the wounds, we checked the position of the hardware using biplanar fluoroscopy felt an anatomic reduction, appropriate hardware placement.  The patient was placed in a well-padded postoperative dressing after closing the skin and soft tissue with absorbable sutures in layers with absorbable sutures and nylon sutures in the skin staples in the skin.  She was placed in a well-padded postoperative dressing, taken to recovery room in stable condition.  She tolerated the procedure without difficulty.         YVETTE CUEVAS MD             D: 2018   T: 2018   MT: HANK      Name:     RAS CASTILLO   MRN:      [id number]        Account:        [id number]   :      1955           Procedure Date: 2018      Document: [id number]

## 2018-06-02 NOTE — CONSULTS
Consult Date:  06/01/2018      REQUESTING PHYSICIAN:  Dr. Eliazar Agustin      DIAGNOSIS:  Right intertrochanteric femur fracture.      HISTORY OF PRESENT ILLNESS:  Ms. Cazares is a very pleasant 62-year-old female who was walking her dog earlier today when one of her dogs got crossed up, causing her to fall.  She had right hip pain.  She was unable to ambulate.  She was taken to [facility]  where x-rays revealed a nondisplaced right intertrochanteric femur fracture.  She was admitted for definitive care.      PAST MEDICAL/SURGICAL HISTORY:  Significant for hypertension, dyslipidemia, coronary artery disease with history of an MI, status post angioplasty in 2008, nonsustained ventricular tachycardia, history of a left distal radius fracture January 2018 following a mechanical fall, status post conservative management.      MEDICATIONS PRIOR TO ADMISSION:  She is on Prolia, Zocor, multivitamin, Toprol, lisinopril, calcium carbonate and aspirin.      ALLERGIES:  FOSAMAX.        SOCIAL HISTORY:  Nonsmoker, rare alcohol use.  She is otherwise ambulatory without gait aids and lives independently.      FAMILY HISTORY:  Noncontributory.      REVIEW OF SYSTEMS:  A 10-point review of systems is negative except for right hip pain.      PHYSICAL EXAMINATION:   GENERAL:  She is alert, oriented, very conversive, lying in her hospital bed in no apparent distress.   VITAL SIGNS:  She is afebrile.  Vital signs are stable.   EXTREMITIES:  RIGHT AND LEFT UPPER EXTREMITY:  She has abrasions around her right arm and on the palm of her right hand, but otherwise her skin is clean and intact.  Palpable radial pulse, radial, ulnar and median nerve sensation function intact bilaterally.  She has no tenderness to palpation right and left lower extremity.  She has pain with range of motion of the right hip.  She has full sensation in superficial, deep peroneus, saphenous, tibial and sural nerves bilaterally.  She has full  function of the EHL, FHL, tibias anterior, gastroc soleus bilaterally.  She has full palpable dorsalis pedis pulses.      IMAGING:  X-rays of her right hip shows a nondisplaced right basicervical femoral neck to intertrochanteric femur fracture:         LABORATORY DATA ON ADMISSION:  White blood cell count is 11.6, hemoglobin is 13.6.  INR is 1.03.  Sodium is 137, potassium 4.3.  Creatinine 0.7.      IMPRESSION, REPORT AND PLAN:  I had  a long discussion with Ms. Cazares regarding her right intertrochanteric femur fracture.  I think she would benefit from internal fixation with intramedullary hip screw.  She understands the risks, benefits, alternatives and wished to proceed with surgery.  We will make arrangements for surgery as soon as she is medically cleared.         YVETTE CUEVAS MD             D: 2018   T: 2018   MT: SHANEKA      Name:     RAS CAZARES   MRN:      [id number]        Account:       [id number]   :      1955           Consult Date:  2018      Document: [id number]       cc: Eliazar GILLESPIE MD

## 2018-06-02 NOTE — ANESTHESIA PROCEDURE NOTES
Peripheral nerve/Neuraxial procedure note : intrathecal  Pre-Procedure  Performed by VERNELL CUELLAR  Location: OR      Pre-Anesthestic Checklist: patient identified, IV checked, risks and benefits discussed, informed consent, monitors and equipment checked, pre-op evaluation and at physician/surgeon's request    Timeout  Correct Patient: Yes   Correct Procedure: Yes   Correct Site: Yes   Correct Laterality: N/A   Correct Position: Yes   Site Marked: N/A   .   Procedure Documentation  ASA 3 and Emergent  .    Procedure:    Intrathecal.  Insertion Site:L3-4  (midline approach)      Patient Prep;mask, sterile gloves, chlorhexidine gluconate and isopropyl alcohol, patient draped.  .  Needle: Rose Mary-Nixon Spinal Needle (gauge): 25  Spinal/LP Needle Length (inches): 3.5 # of attempts: 1 and # of redirects:  Introducer used Introducer: 20 G .       Assessment/Narrative  Paresthesias: No.  .  .  clear CSF fluid removed .

## 2018-06-02 NOTE — ANESTHESIA POSTPROCEDURE EVALUATION
Patient: Robin Cazares    Procedure(s):  OPEN REDUCTION INTERNAL FIXATION RIGHT FEMUR FRACTURE - Wound Class: I-Clean    Diagnosis:RIGHT INTERTROCHANTERIC FEMUR FRACTURE  Diagnosis Additional Information: No value filed.    Anesthesia Type:  Spinal    Note:  Anesthesia Post Evaluation    Patient location during evaluation: Bedside  Patient participation: Able to participate in evaluation but full recovery from regional anesthesia has not yet occurred and is not anticipated to occur within 48 hours  Level of consciousness: awake and alert  Pain management: adequate  Airway patency: patent  Cardiovascular status: acceptable  Respiratory status: acceptable  Hydration status: acceptable  PONV: none             Last vitals:  Vitals:    06/02/18 1400 06/02/18 1410 06/02/18 1420   BP: 111/56  114/67   Pulse:      Resp: 15 11 11   Temp: 36.2  C (97.2  F)     SpO2: 96% 98% 97%         Electronically Signed By: Ignacio Messina MD  June 2, 2018  2:40 PM

## 2018-06-02 NOTE — PLAN OF CARE
Problem: Patient Care Overview  Goal: Plan of Care/Patient Progress Review  Outcome: Improving  Pt is A&O. On tele w/ sinus bradycardia. Bedrest. NPO since midnight. Pain well managed w/ norco. Jara patent w/ adequate amt of output. Surgery today @ 1120.

## 2018-06-02 NOTE — ANESTHESIA PREPROCEDURE EVALUATION
Anesthesia Evaluation     . Pt has had prior anesthetic.            ROS/MED HX    ENT/Pulmonary:  - neg pulmonary ROS    (-) sleep apnea   Neurologic:  - neg neurologic ROS     Cardiovascular: Comment: Exercise-induced VT - neg cardiovascular ROS   (+) Dyslipidemia, --CAD, -past MI,-. : . . . :. dysrhythmias Other, Irregular Heartbeat/Palpitations, . Previous cardiac testing Echodate:2017results:This was a normal stress echocardiogram.  The study was technically adequate.  Did note exercise-induced VT during studydate: results:ECG reviewed date:6/2018 results:NSR, precordial T wave flattening date: results:          METS/Exercise Tolerance:     Hematologic:  - neg hematologic  ROS       Musculoskeletal:  - neg musculoskeletal ROS (+) , , other musculoskeletal- Osteoporosis      GI/Hepatic:  - neg GI/hepatic ROS      (-) GERD   Renal/Genitourinary:  - ROS Renal section negative       Endo:  - neg endo ROS       Psychiatric:  - neg psychiatric ROS       Infectious Disease:  - neg infectious disease ROS       Malignancy:         Other:                     Physical Exam  Normal systems: dental    Airway   Mallampati: II  TM distance: >3 FB  Neck ROM: full    Dental     Cardiovascular   Rhythm and rate: regular      Pulmonary    breath sounds clear to auscultation                    Anesthesia Plan      History & Physical Review  History and physical reviewed and following examination; no interval change.    ASA Status:  3 emergent.    NPO Status:  > 8 hours    Plan for Spinal   PONV prophylaxis:  Ondansetron (or other 5HT-3)  Counseled patient regarding postoperative cognitive dysfunction after she inquired about it      Postoperative Care  Postoperative pain management:  Multi-modal analgesia.      Consents  Anesthetic plan, risks, benefits and alternatives discussed with:  Patient.  Use of blood products discussed: Yes.   Use of blood products discussed with Patient.  Consented to blood products.  .           Procedure: Procedure(s):  OPEN REDUCTION INTERNAL FIXATION FEMUR PROXIMAL  Preop diagnosis: RIGHT INTERTROCHANTERIC FEMUR FRACTURE    Allergies   Allergen Reactions     Fosamax [Alendronic Acid] Palpitations     Past Medical History:   Diagnosis Date     CAD (coronary artery disease)     8/2008 angioplasty occluded distal OM branch     Hyperlipidemia      Internal hemorrhoids      MI (myocardial infarction)     2008     Osteopenia      Osteoporosis      Palpitations      S/P breast lumpectomy      Vaginal bleeding      Past Surgical History:   Procedure Laterality Date     HEART CATH, ANGIOPLASTY      8/2008 angioplasty occluded distal OM branch     Social History   Substance Use Topics     Smoking status: Never Smoker     Smokeless tobacco: Never Used     Alcohol use 0.0 oz/week     0 Standard drinks or equivalent per week      Comment: rare      Prior to Admission medications    Medication Sig Start Date End Date Taking? Authorizing Provider   aspirin 81 MG chewable tablet Take 81 mg by mouth daily.   Yes Reported, Patient   calcium carb 1250 mg, 500 mg Sun'aq,/vitamin D 200 unit (OSCAL 500/200 D-3) 500-200 MG-UNIT per tablet Take 1 tablet by mouth every other day    Yes Reported, Patient   lisinopril (PRINIVIL/ZESTRIL) 2.5 MG tablet Take 1 tablet (2.5 mg) by mouth daily 12/7/17  Yes Ginny Baldwin DO   metoprolol (TOPROL-XL) 25 MG 24 hr tablet Take 1 tablet (25 mg) by mouth daily  Patient taking differently: Take 25 mg by mouth At Bedtime  11/15/17  Yes Terri Morrison APRN CNP   Multiple vitamin TABS Take by mouth every other day    Yes Reported, Patient   simvastatin (ZOCOR) 40 MG tablet Take 1 tablet (40 mg) by mouth At Bedtime 12/7/17  Yes Ginny Baldwin DO   Denosumab (PROLIA SC) Inject Subcutaneous every 6 months As directed     Reported, Patient     Current Facility-Administered Medications Ordered in Epic   Medication Dose Route Frequency Last Rate Last Dose      [Auto Hold] acetaminophen (TYLENOL) tablet 650 mg  650 mg Oral Q4H PRN         [Auto Hold] bisacodyl (DULCOLAX) Suppository 10 mg  10 mg Rectal Daily PRN         [Auto Hold] hydrALAZINE (APRESOLINE) injection 10 mg  10 mg Intravenous Q4H PRN         [Auto Hold] HYDROcodone-acetaminophen (NORCO) 5-325 MG per tablet 1-2 tablet  1-2 tablet Oral Q4H PRN   1 tablet at 06/02/18 0635     [Auto Hold] HYDROmorphone (PF) (DILAUDID) injection 0.2 mg  0.2 mg Intravenous Q2H PRN         [Auto Hold] magnesium hydroxide (MILK OF MAGNESIA) suspension 30 mL  30 mL Oral Daily PRN         [Auto Hold] melatonin tablet 1 mg  1 mg Oral At Bedtime PRN         [Auto Hold] metoprolol succinate (TOPROL-XL) 24 hr tablet 25 mg  25 mg Oral At Bedtime         [Auto Hold] naloxone (NARCAN) injection 0.1-0.4 mg  0.1-0.4 mg Intravenous Q2 Min PRN         [Auto Hold] ondansetron (ZOFRAN-ODT) ODT tab 4 mg  4 mg Oral Q6H PRN        Or     [Auto Hold] ondansetron (ZOFRAN) injection 4 mg  4 mg Intravenous Q6H PRN         [Auto Hold] prochlorperazine (COMPAZINE) injection 10 mg  10 mg Intravenous Q6H PRN        Or     [Auto Hold] prochlorperazine (COMPAZINE) tablet 10 mg  10 mg Oral Q6H PRN        Or     [Auto Hold] prochlorperazine (COMPAZINE) Suppository 25 mg  25 mg Rectal Q12H PRN         [Auto Hold] senna-docusate (SENOKOT-S;PERICOLACE) 8.6-50 MG per tablet 1 tablet  1 tablet Oral BID PRN        Or     [Auto Hold] senna-docusate (SENOKOT-S;PERICOLACE) 8.6-50 MG per tablet 2 tablet  2 tablet Oral BID PRN         No current Epic-ordered outpatient prescriptions on file.       Wt Readings from Last 1 Encounters:   06/01/18 56.7 kg (125 lb)     Temp Readings from Last 1 Encounters:   06/02/18 36.7  C (98.1  F) (Oral)     BP Readings from Last 6 Encounters:   06/02/18 102/57   11/15/17 120/80   10/04/17 128/76   09/14/17 118/68   09/19/16 118/80   08/24/15 136/82     Pulse Readings from Last 4 Encounters:   06/02/18 54   11/15/17 60   10/04/17 66    09/14/17 60     Resp Readings from Last 1 Encounters:   06/02/18 16     SpO2 Readings from Last 1 Encounters:   06/02/18 98%     Recent Labs   Lab Test  06/01/18   1415  09/19/16   0726   NA  137  141   POTASSIUM  4.3  5.0   CHLORIDE  104  105   CO2  27  29   ANIONGAP  6  12   GLC  119*  95   BUN  24  18   CR  0.70  1.01   HEIDI  10.3*  10.4     Recent Labs   Lab Test  09/14/17   0758  09/19/16   0726   ALT  19  <5*     Recent Labs   Lab Test  06/01/18   1415 09/05/13   WBC  11.6*   --    HGB  13.6  13.8   PLT  201   --      Recent Labs   Lab Test  06/01/18   1415   INR  1.03     Recent Results (from the past 744 hour(s))   XR Pelvis w Hip Right 1 View    Narrative    XR PELVIS AND HIP RIGHT 1 VIEW 6/1/2018 2:03 PM    HISTORY: Fall.    COMPARISON: None.      Impression    IMPRESSION: Mildly displaced right femoral neck fracture.    BERRY SAAVEDRA MD   XR Chest 1 View    Narrative    XR CHEST 1 VW 6/1/2018 2:04 PM    HISTORY: Fracture.    COMPARISON: August 15, 2008.      Impression    IMPRESSION: The lungs are clear. No focal pulmonary opacities. Heart  and mediastinum are unremarkable. No acute cardiopulmonary  abnormalities.    BERRY SAAVEDRA MD

## 2018-06-02 NOTE — ANESTHESIA CARE TRANSFER NOTE
Patient: Robin Cazares    Procedure(s):  OPEN REDUCTION INTERNAL FIXATION RIGHT FEMUR FRACTURE - Wound Class: I-Clean    Diagnosis: RIGHT INTERTROCHANTERIC FEMUR FRACTURE  Diagnosis Additional Information: No value filed.    Anesthesia Type:   Spinal     Note:  Airway :Room Air  Patient transferred to:PACU  Comments: Report to pacu rn Handoff Report: Identifed the Patient, Identified the Reponsible Provider, Reviewed the pertinent medical history, Discussed the surgical course, Reviewed Intra-OP anesthesia mangement and issues during anesthesia, Set expectations for post-procedure period and Allowed opportunity for questions and acknowledgement of understanding      Vitals: (Last set prior to Anesthesia Care Transfer)    CRNA VITALS  6/2/2018 1218 - 6/2/2018 1254      6/2/2018             Resp Rate (set): 10                Electronically Signed By: GUICHO Ramirez CRNA  June 2, 2018  12:54 PM

## 2018-06-03 ENCOUNTER — APPOINTMENT (OUTPATIENT)
Dept: PHYSICAL THERAPY | Facility: CLINIC | Age: 63
End: 2018-06-03
Attending: ORTHOPAEDIC SURGERY
Payer: COMMERCIAL

## 2018-06-03 LAB
GLUCOSE SERPL-MCNC: 94 MG/DL (ref 70–99)
HGB BLD-MCNC: 9.8 G/DL (ref 11.7–15.7)

## 2018-06-03 PROCEDURE — 82947 ASSAY GLUCOSE BLOOD QUANT: CPT | Performed by: ORTHOPAEDIC SURGERY

## 2018-06-03 PROCEDURE — 99232 SBSQ HOSP IP/OBS MODERATE 35: CPT | Performed by: INTERNAL MEDICINE

## 2018-06-03 PROCEDURE — 12000007 ZZH R&B INTERMEDIATE

## 2018-06-03 PROCEDURE — 40000193 ZZH STATISTIC PT WARD VISIT

## 2018-06-03 PROCEDURE — 97116 GAIT TRAINING THERAPY: CPT | Mod: GP

## 2018-06-03 PROCEDURE — 85018 HEMOGLOBIN: CPT | Performed by: ORTHOPAEDIC SURGERY

## 2018-06-03 PROCEDURE — 25000128 H RX IP 250 OP 636: Performed by: ORTHOPAEDIC SURGERY

## 2018-06-03 PROCEDURE — 36415 COLL VENOUS BLD VENIPUNCTURE: CPT | Performed by: ORTHOPAEDIC SURGERY

## 2018-06-03 PROCEDURE — 97530 THERAPEUTIC ACTIVITIES: CPT | Mod: GP

## 2018-06-03 PROCEDURE — 97161 PT EVAL LOW COMPLEX 20 MIN: CPT | Mod: GP

## 2018-06-03 PROCEDURE — 97110 THERAPEUTIC EXERCISES: CPT | Mod: GP

## 2018-06-03 PROCEDURE — 25000132 ZZH RX MED GY IP 250 OP 250 PS 637: Performed by: ORTHOPAEDIC SURGERY

## 2018-06-03 RX ORDER — OXYCODONE AND ACETAMINOPHEN 5; 325 MG/1; MG/1
1-2 TABLET ORAL EVERY 4 HOURS PRN
Qty: 60 TABLET | Refills: 0 | Status: SHIPPED | OUTPATIENT
Start: 2018-06-03 | End: 2023-10-15

## 2018-06-03 RX ADMIN — SENNOSIDES AND DOCUSATE SODIUM 1 TABLET: 8.6; 5 TABLET ORAL at 07:55

## 2018-06-03 RX ADMIN — OXYCODONE HYDROCHLORIDE 10 MG: 5 TABLET ORAL at 05:35

## 2018-06-03 RX ADMIN — ENOXAPARIN SODIUM 40 MG: 40 INJECTION SUBCUTANEOUS at 10:18

## 2018-06-03 RX ADMIN — OXYCODONE HYDROCHLORIDE 10 MG: 5 TABLET ORAL at 00:43

## 2018-06-03 RX ADMIN — RANITIDINE 150 MG: 150 TABLET ORAL at 20:48

## 2018-06-03 RX ADMIN — ACETAMINOPHEN 975 MG: 325 TABLET, FILM COATED ORAL at 18:58

## 2018-06-03 RX ADMIN — ACETAMINOPHEN 975 MG: 325 TABLET, FILM COATED ORAL at 10:18

## 2018-06-03 RX ADMIN — SENNOSIDES AND DOCUSATE SODIUM 2 TABLET: 8.6; 5 TABLET ORAL at 20:49

## 2018-06-03 RX ADMIN — CEFAZOLIN SODIUM 1 G: 1 INJECTION, POWDER, FOR SOLUTION INTRAMUSCULAR; INTRAVENOUS at 04:54

## 2018-06-03 RX ADMIN — CELECOXIB 200 MG: 200 CAPSULE ORAL at 20:49

## 2018-06-03 RX ADMIN — ACETAMINOPHEN 975 MG: 325 TABLET, FILM COATED ORAL at 00:44

## 2018-06-03 RX ADMIN — CELECOXIB 200 MG: 200 CAPSULE ORAL at 07:55

## 2018-06-03 NOTE — PROGRESS NOTES
"Orthopedic Surgery  6/3/2018    S: Patient voices no complaints today.     O: Blood pressure (!) 89/46, pulse 57, temperature 98.1  F (36.7  C), temperature source Oral, resp. rate 16, height 1.702 m (5' 7\"), weight 56.7 kg (125 lb), SpO2 98 %.  Lab Results   Component Value Date    HGB 9.8 06/03/2018     Lab Results   Component Value Date    INR 1.03 06/01/2018        Neurovascularly intact.  Calves are negative bilaterally, both soft and nontender.  The wound is C/D/I.  The wound looks good with minimal erythema of the surrounding skin.    A: Ms. Cazares is doing well status post Procedure(s):  OPEN REDUCTION INTERNAL FIXATION FEMUR PROXIMAL.    P: Continue physical therapy.   Anticoagulation   Pain management  Discharge planning    Perico Zapata  [phone]    "

## 2018-06-03 NOTE — PLAN OF CARE
Problem: Patient Care Overview  Goal: Plan of Care/Patient Progress Review  Outcome: Improving  Pt is AAOx4, pain is managed, CMS intact, dangled, dressing CDI, desats to 88% on RA, 95% on 2L O2, tele - Sinus elizabeth. Jara dc, due to void. Tolerated reg diet. Progressing per plan of care.

## 2018-06-03 NOTE — PLAN OF CARE
Problem: Patient Care Overview  Goal: Plan of Care/Patient Progress Review  Outcome: Improving  Pt up with SBA, Voiding.  Plan to dc home tomorrow.

## 2018-06-03 NOTE — PROGRESS NOTES
06/03/18 0800   Quick Adds   Type of Visit Initial PT Evaluation   Living Environment   Lives With alone   Living Arrangements house   Home Accessibility stairs to enter home   Number of Stairs to Enter Home 3   Number of Stairs Within Home 0   Stair Railings at Home outside, present on left side   Transportation Available car;family or friend will provide   Self-Care   Dominant Hand right   Usual Activity Tolerance good   Current Activity Tolerance moderate   Regular Exercise yes   Activity/Exercise Type walking   Exercise Amount/Frequency daily   Equipment Currently Used at Home none   Functional Level Prior   Ambulation 0-->independent   Transferring 0-->independent   Fall history within last six months yes   Number of times patient has fallen within last six months 1   Which of the above functional risks had a recent onset or change? ambulation;transferring   Prior Functional Level Comment Pt reports independence with mobility prior to admit.   General Information   Onset of Illness/Injury or Date of Surgery - Date 06/01/18   Referring Physician Dr. Zapata   Patient/Family Goals Statement To go home   Pertinent History of Current Problem (include personal factors and/or comorbidities that impact the POC) Pt is a 62 year old female admitted s/p fall who underwent right IM nailing on 6/2/18.   Precautions/Limitations fall precautions   Weight-Bearing Status - RLE weight-bearing as tolerated   Cognitive Status Examination   Orientation orientation to person, place and time   Level of Consciousness alert   Follows Commands and Answers Questions 100% of the time   Pain Assessment   Patient Currently in Pain No   Range of Motion (ROM)   ROM Quick Adds No deficits were identified   Strength   Strength Comments Right hip grossly 4/5, all other strength WFL.   Bed Mobility   Bed Mobility Comments Supine to sit min assist   Transfer Skills   Transfer Comments Sit to/from stand min assist   Gait   Gait Comments 15'  "FWW CGA, decreased frida and step length   Balance   Balance Comments Good in sitting, fair in standing   General Therapy Interventions   Planned Therapy Interventions balance training;bed mobility training;transfer training;strengthening;gait training;progressive activity/exercise;home program guidelines   Clinical Impression   Criteria for Skilled Therapeutic Intervention yes, treatment indicated   PT Diagnosis Impaired ambulation   Influenced by the following impairments Decreased strength, decreased endurance, decreased balance   Functional limitations due to impairments Difficulty with bed mobility, transfers, ambulation, stair management   Clinical Presentation Stable/Uncomplicated   Clinical Presentation Rationale VSS, pain controlled   Clinical Decision Making (Complexity) Low complexity   Therapy Frequency` daily   Predicted Duration of Therapy Intervention (days/wks) 3 days   Anticipated Equipment Needs at Discharge walker   Anticipated Discharge Disposition Home with Assist   Risk & Benefits of therapy have been explained Yes   Patient, Family & other staff in agreement with plan of care Yes   [facility] TM \"6 Clicks\"   2016, Trustees of [facility], under license to [employer].  All rights reserved.   6 Clicks Short Forms Basic Mobility Inpatient Short Form   [facility]  \"6 Clicks\" V.2 Basic Mobility Inpatient Short Form   1. Turning from your back to your side while in a flat bed without using bedrails? 3 - A Little   2. Moving from lying on your back to sitting on the side of a flat bed without using bedrails? 3 - A Little   3. Moving to and from a bed to a chair (including a wheelchair)? 3 - A Little   4. Standing up from a chair using your arms (e.g., wheelchair, or bedside chair)? 3 - A Little   5. To walk in hospital room? 3 - A Little   6. Climbing 3-5 steps with a railing? 3 - A Little   Basic Mobility Raw Score (Score out of 24.Lower scores equate to lower " levels of function) 18   Total Evaluation Time   Total Evaluation Time (Minutes) 10

## 2018-06-03 NOTE — PLAN OF CARE
Problem: Patient Care Overview  Goal: Plan of Care/Patient Progress Review  Physical Therapy: Order received, evaluation completed and treatment initiated. Pt is a 62 year old female s/p fall who underwent right IM nail 2/2 femoral neck fracture on 6/2/18 by Dr. Zapata, orders for WBAT on right. At baseline, pt lives alone in a home with 3 steps to enter. Reports that she was very active and works full-time prior to admit. Pt has help at home as needed.    Discharge Planner PT   Patient plan for discharge: Home with help  Current status: Pt requires min assist for bed mobility for management of right LE, good sitting balance at EOB. Pt performs sit to/from stand with min assist. Pt able to ambulate 50' with FWW and CGA. Pt tolerates LE strengthening exercises without complaint.  Barriers to return to prior living situation: Steps to enter, typically lives alone, fall history  Recommendations for discharge: Home with assist  Rationale for recommendations: Anticipate with further medical management and therapy, pt will be safe to discharge home with help as needed.       Entered by: Nayana Meyer 06/03/2018 8:50 AM

## 2018-06-03 NOTE — PROGRESS NOTES
[facility]    Hospitalist Progress Note    Date of Service (when I saw the patient): 06/03/2018    Assessment & Plan   Robin Cazares is a 62 year old female with hx of CAD s/p PCI and HTN who was admitted on 6/1/2018 acute right hip fracture following a fall. She is now s/p uncomplicated ORIF on 6/2/18    Acute right femoral neck fracture following fall s/p ORIF on 6/2/2018  Fell onto her right hip while walking her dog. XR pelvis/R hip on arrival showed mildly displaced right femoral neck fracture. Ortho was consulted on admission and patient underwent uncomplicated ORIF by Dr. LIONEL Zapata on 6/2.  - Post-op cares as per Ortho    Acute blood loss anemia due to surgery  EBL unknown  - Hgb 9.8 from 13.6, will follow    CAD, native heart, native vessels s/p PCI  Essential hypertension  - Blood pressures borderline this morning  - Continues on PTA metoprolol with hold parameters  - Holding home lisinopril  - Hold home aspirin until cleared by Ortho  - Resume simvastatin at discharge    # Pain Assessment:  Current Pain Score 6/3/2018   Patient currently in pain? -   Pain score (0-10) 1   Pain location -   Pain descriptors -   - Robin is experiencing pain due to right hip fracture. Pain management was discussed and the plan was created in a collaborative fashion.  Robin's response to the current recommendations: engaged  - Opioid regimen: Norco #1-2 q4h prn, Dilaudid 0.2 mg q2h prn  - Response to opioid medications: Reduction of symptoms  - Bowel regimen: not needed  - Pharmacologic adjuvants: Acetaminophen    DVT Prophylaxis: Pneumatic Compression Devices  Code Status: Prior    Disposition: Expected discharge likely tomorrow    Reny Winkler    Interval History   No events overnight. BP borderline, but denies dizziness/lightheadedness. Pain adequately controlled. Denies cp/sob or nausea.   - No change in care plan by me. Plan PT/OT today    -Data reviewed today: I reviewed all new labs  and imaging results over the last 24 hours. I personally reviewed no images or EKG's today.    Physical Exam   Temp: 98.1  F (36.7  C) Temp src: Oral BP: (!) 89/46 Pulse: 57 Heart Rate: 57 Resp: 16 SpO2: 98 % O2 Device: Nasal cannula Oxygen Delivery: 2 LPM  Vitals:    06/01/18 1233   Weight: 56.7 kg (125 lb)     Vital Signs with Ranges  Temp:  [97.2  F (36.2  C)-98.7  F (37.1  C)] 98.1  F (36.7  C)  Pulse:  [57-67] 57  Heart Rate:  [55-76] 57  Resp:  [7-19] 16  BP: ()/(37-68) 89/46  SpO2:  [87 %-100 %] 98 %  I/O last 3 completed shifts:  In: 3635 [P.O.:1500; I.V.:1885]  Out: 1350 [Urine:1300; Blood:50]    Constitutional: Appears comfortable, NAD  Respiratory: Breathing non-labored. Lungs CTAB, no wheezes/crackles/rhonchi  Cardiovascular: Heart RRR, no m/r/g. No pedal edema  GI: +BS, abd soft/NT  Skin/Integumen: No rash  Neuro: Alert and appropriate, LO  Psych: Calm and cooperative    Medications     sodium chloride Stopped (06/03/18 0448)       acetaminophen  975 mg Oral Q8H     celecoxib  200 mg Oral BID     enoxaparin  40 mg Subcutaneous Q24H     metoprolol succinate  25 mg Oral At Bedtime     ondansetron  4 mg Intravenous Q6H     ranitidine  150 mg Oral BID     senna-docusate  1 tablet Oral BID    Or     senna-docusate  2 tablet Oral BID     sodium chloride (PF)  3 mL Intracatheter Q8H     Data     Recent Labs  Lab 06/03/18  0534 06/02/18  1645 06/01/18  1415   WBC  --   --  11.6*   HGB 9.8*  --  13.6   MCV  --   --  89   PLT  --  126* 201   INR  --   --  1.03   NA  --   --  137   POTASSIUM  --   --  4.3   CHLORIDE  --   --  104   CO2  --   --  27   BUN  --   --  24   CR  --  0.70 0.70   ANIONGAP  --   --  6   HEIDI  --   --  10.3*   GLC 94  --  119*       Recent Results (from the past 24 hour(s))   XR Surgery KALEB Fluoro L/T 5 Min w Stills    Narrative    SURGERY C-ARM FLUOROSCOPY LESS THAN FIVE MINUTES WITH STILLS 6/2/2018  12:35 PM     COMPARISON: None.    HISTORY: ORIF Right Hip. Fluoro time 1.2, 4  images, C-arm 1.    NUMBER OF IMAGES ACQUIRED: 4.    VIEWS: 2.    FLUOROSCOPY TIME: 1.2 minute(s).      Impression    IMPRESSION: Multiple intraoperative fluoroscopic views acquired during  ORIF for a low cervical fracture of the right femoral neck.    LISBET PARDO MD

## 2018-06-04 ENCOUNTER — APPOINTMENT (OUTPATIENT)
Dept: PHYSICAL THERAPY | Facility: CLINIC | Age: 63
End: 2018-06-04
Payer: COMMERCIAL

## 2018-06-04 VITALS
TEMPERATURE: 98.2 F | HEART RATE: 65 BPM | OXYGEN SATURATION: 97 % | BODY MASS INDEX: 19.62 KG/M2 | DIASTOLIC BLOOD PRESSURE: 65 MMHG | SYSTOLIC BLOOD PRESSURE: 116 MMHG | WEIGHT: 125 LBS | RESPIRATION RATE: 16 BRPM | HEIGHT: 67 IN

## 2018-06-04 LAB
GLUCOSE SERPL-MCNC: 102 MG/DL (ref 70–99)
HGB BLD-MCNC: 10.1 G/DL (ref 11.7–15.7)

## 2018-06-04 PROCEDURE — 36415 COLL VENOUS BLD VENIPUNCTURE: CPT | Performed by: ORTHOPAEDIC SURGERY

## 2018-06-04 PROCEDURE — 40000193 ZZH STATISTIC PT WARD VISIT

## 2018-06-04 PROCEDURE — 25000132 ZZH RX MED GY IP 250 OP 250 PS 637: Performed by: ORTHOPAEDIC SURGERY

## 2018-06-04 PROCEDURE — 25000128 H RX IP 250 OP 636: Performed by: ORTHOPAEDIC SURGERY

## 2018-06-04 PROCEDURE — 97530 THERAPEUTIC ACTIVITIES: CPT | Mod: GP

## 2018-06-04 PROCEDURE — 97116 GAIT TRAINING THERAPY: CPT | Mod: GP

## 2018-06-04 PROCEDURE — 99232 SBSQ HOSP IP/OBS MODERATE 35: CPT | Performed by: INTERNAL MEDICINE

## 2018-06-04 PROCEDURE — 82947 ASSAY GLUCOSE BLOOD QUANT: CPT | Performed by: ORTHOPAEDIC SURGERY

## 2018-06-04 PROCEDURE — 97110 THERAPEUTIC EXERCISES: CPT | Mod: GP

## 2018-06-04 PROCEDURE — 85018 HEMOGLOBIN: CPT | Performed by: ORTHOPAEDIC SURGERY

## 2018-06-04 RX ADMIN — ACETAMINOPHEN 975 MG: 325 TABLET, FILM COATED ORAL at 10:26

## 2018-06-04 RX ADMIN — ACETAMINOPHEN 975 MG: 325 TABLET, FILM COATED ORAL at 02:35

## 2018-06-04 RX ADMIN — ENOXAPARIN SODIUM 40 MG: 40 INJECTION SUBCUTANEOUS at 10:26

## 2018-06-04 RX ADMIN — CELECOXIB 200 MG: 200 CAPSULE ORAL at 08:27

## 2018-06-04 RX ADMIN — SENNOSIDES AND DOCUSATE SODIUM 2 TABLET: 8.6; 5 TABLET ORAL at 08:27

## 2018-06-04 NOTE — PROGRESS NOTES
Orthopedic Surgery  Marmumtaz Cazares  2018  Admit Date:  2018  POD # 2  S/P Right femoral neck fracture    Patient resting comfortably in bed.    Pain controlled.  Tolerating oral intake.    Denies nausea or vomiting  Denies chest pain or shortness of breath  No events overnight.     Alert and orient to person, place, and time.  Vital Sign Ranges  Temperature Temp  Av  F (36.7  C)  Min: 97.3  F (36.3  C)  Max: 98.4  F (36.9  C)   Blood pressure Systolic (24hrs), Av , Min:96 , Max:130        Diastolic (24hrs), Av, Min:49, Max:65      Pulse Pulse  Av.7  Min: 65  Max: 67   Respirations Resp  Av  Min: 12  Max: 19   Pulse oximetry SpO2  Av.6 %  Min: 97 %  Max: 98 %       Dressing is clean, dry, and intact.   Minimal erythema of the surrounding skin.   Bilateral calves are soft, non-tender.  Bilateral lower extremity is NVI.  Sensation intact bilateral lower extremities  5/5 motor with resisted dorsi and plantar flexion bilaterally  +Dp pulse    Labs:  Recent Labs   Lab Test  18   1415  16   0726  08/24/15   0822   POTASSIUM  4.3  5.0  4.2     Recent Labs   Lab Test  18   0627  18   0534  18   1415   HGB  10.1*  9.8*  13.6     Recent Labs   Lab Test  18   1415   INR  1.03     Recent Labs   Lab Test  18   1645  18   1415   PLT  126*  201       A/P  1. Plan   Continue Lovenox for DVT prophylaxis - ASA on d/c.     Mobilize with PT/OT    WBAT.     Continue current pain regiment.   Change dressing daily    2. Disposition   Anticipate d/c to home today.    Stephanie Paul PA-C

## 2018-06-04 NOTE — PLAN OF CARE
Problem: Patient Care Overview  Goal: Plan of Care/Patient Progress Review  Outcome: Improving  Pt having good pain control. Up Independent with walker. Dressing changed prior to discharge. Discharge instructions reviewed with patient, all questions answered.

## 2018-06-04 NOTE — PROGRESS NOTES
[facility]    Hospitalist Progress Note    Date of Service (when I saw the patient): 06/04/2018    Assessment & Plan   Robin Cazares is a 62 year old female with hx of CAD s/p PCI and HTN who was admitted on 6/1/2018 acute right hip fracture following a fall. She is now s/p uncomplicated ORIF on 6/2/18    Acute right femoral neck fracture following fall s/p ORIF on 6/2/2018  Fell onto her right hip while walking her dog. XR pelvis/R hip on arrival showed mildly displaced right femoral neck fracture. Ortho was consulted on admission and patient underwent uncomplicated ORIF by Dr. LIONEL Zapata on 6/2.  - She will be discharged with aspirin 325 mg as per Ortho for VTE prophylaxis    Acute blood loss anemia due to surgery  EBL unknown. Hgb prior to surgery 13.6, decreased to 9-10 range following surgery and stable.    CAD, native heart, native vessels s/p PCI  Essential hypertension  Chronic and stable, but had borderline blood pressure following surgery  - She has been advised to hold lisinopril until cleared by her PCP  - She continues on her home metoprolol and simvastatin  - She has been advised to hold her home aspirin while on full dose aspirin    # Pain Assessment:  Current Pain Score 6/4/2018   Patient currently in pain? yes   Pain score (0-10) 4   Pain location Hip   Pain descriptors -   - Robin is experiencing pain due to right hip fracture. Pain management was discussed and the plan was created in a collaborative fashion.  Robin's response to the current recommendations: engaged  - Opioid regimen: Norco #1-2 q4h prn, Dilaudid 0.2 mg q2h prn  - Response to opioid medications: Reduction of symptoms  - Bowel regimen: not needed  - Pharmacologic adjuvants: Acetaminophen    DVT Prophylaxis: Pneumatic Compression Devices  Code Status: Prior    Disposition: Expected discharge likely tomorrow    Reny Winkler    Interval History   No events overnight. Offers no concerns. Eager to  discharge today. Discharge recommendations reviewed with patient.     -Data reviewed today: I reviewed all new labs and imaging results over the last 24 hours. I personally reviewed no images or EKG's today.    Physical Exam   Temp: 98.2  F (36.8  C) Temp src: Oral BP: 116/65 Pulse: 65 Heart Rate: 66 Resp: 16 SpO2: 97 % O2 Device: None (Room air)    Vitals:    06/01/18 1233   Weight: 56.7 kg (125 lb)     Vital Signs with Ranges  Temp:  [97.3  F (36.3  C)-98.4  F (36.9  C)] 98.2  F (36.8  C)  Pulse:  [65-67] 65  Heart Rate:  [56-66] 66  Resp:  [12-19] 16  BP: ()/(49-65) 116/65  SpO2:  [97 %-98 %] 97 %  I/O last 3 completed shifts:  In: 810 [P.O.:810]  Out: 4550 [Urine:4550]    Constitutional: Appears comfortable, NAD  Respiratory: Breathing non-labored. Lungs CTAB, no wheezes/crackles/rhonchi  Cardiovascular: Heart RRR, no m/r/g. No pedal edema  GI: +BS, abd soft/NT  Skin/Integumen: No rash  Neuro: Alert and appropriate, LO  Psych: Calm and cooperative    Medications       acetaminophen  975 mg Oral Q8H     enoxaparin  40 mg Subcutaneous Q24H     metoprolol succinate  25 mg Oral At Bedtime     ranitidine  150 mg Oral BID     senna-docusate  1 tablet Oral BID    Or     senna-docusate  2 tablet Oral BID     sodium chloride (PF)  3 mL Intracatheter Q8H     Data     Recent Labs  Lab 06/04/18  0627 06/03/18  0534 06/02/18  1645 06/01/18  1415   WBC  --   --   --  11.6*   HGB 10.1* 9.8*  --  13.6   MCV  --   --   --  89   PLT  --   --  126* 201   INR  --   --   --  1.03   NA  --   --   --  137   POTASSIUM  --   --   --  4.3   CHLORIDE  --   --   --  104   CO2  --   --   --  27   BUN  --   --   --  24   CR  --   --  0.70 0.70   ANIONGAP  --   --   --  6   HEIDI  --   --   --  10.3*   * 94  --  119*       No results found for this or any previous visit (from the past 24 hour(s)).

## 2018-06-04 NOTE — DISCHARGE SUMMARY
Discharge Summary    Robin Cazares MRN# [id number]   YOB: 1955 Age: 62 year old     Date of Admission:  6/1/2018  Date of Discharge:  6/4/2018  Admitting Physician:  Perico Zapata MD  Discharge Physician:  Stephanie Paul PA-C     Primary Provider: Handler, Keya CHOUDHARY MD11          Admission Diagnoses:   Right femoral neck fracture          Discharge Diagnosis:   Same           Surgical Procedure:   Right femoral neck fracture open reduction and internal rotation           Secondary Diagnosis:   None           Discharge Disposition:   Discharged to home           Medications Prior to Admission:     Prescriptions Prior to Admission   Medication Sig Dispense Refill Last Dose     calcium carb 1250 mg, 500 mg [place],/vitamin D 200 unit (OSCAL 500/200 D-3) 500-200 MG-UNIT per tablet Take 1 tablet by mouth every other day    6/1/2018 at Unknown time     metoprolol (TOPROL-XL) 25 MG 24 hr tablet Take 1 tablet (25 mg) by mouth daily (Patient taking differently: Take 25 mg by mouth At Bedtime ) 90 tablet 3 5/31/2018 at pm     Multiple vitamin TABS Take by mouth every other day    5/31/2018 at Unknown time     simvastatin (ZOCOR) 40 MG tablet Take 1 tablet (40 mg) by mouth At Bedtime 90 tablet 3 5/31/2018 at Unknown time     Denosumab (PROLIA SC) Inject Subcutaneous every 6 months As directed    Taking     [DISCONTINUED] aspirin 81 MG chewable tablet Take 81 mg by mouth daily.   6/1/2018 at Unknown time     [DISCONTINUED] lisinopril (PRINIVIL/ZESTRIL) 2.5 MG tablet Take 1 tablet (2.5 mg) by mouth daily 90 tablet 3 6/1/2018 at Unknown time             Discharge Medications:     Current Discharge Medication List      START taking these medications    Details   aspirin 325 MG EC tablet Take one Aspirin tab twice daily for 5 weeks.  Qty: 70 tablet, Refills: 0    Associated Diagnoses: Fracture of hip, right, closed, initial encounter (H)      order for DME Equipment being ordered: Walker Wheels  () and Walker ()  Treatment Diagnosis: Right hip fracture  Qty: 1 each, Refills: 0    Associated Diagnoses: Fracture of hip, right, closed, initial encounter (H)      oxyCODONE-acetaminophen (PERCOCET) 5-325 MG per tablet Take 1-2 tablets by mouth every 4 hours as needed for severe pain  Qty: 60 tablet, Refills: 0    Associated Diagnoses: Fracture of hip, right, closed, initial encounter (H)         CONTINUE these medications which have NOT CHANGED    Details   calcium carb 1250 mg, 500 mg [place],/vitamin D 200 unit (OSCAL 500/200 D-3) 500-200 MG-UNIT per tablet Take 1 tablet by mouth every other day       metoprolol (TOPROL-XL) 25 MG 24 hr tablet Take 1 tablet (25 mg) by mouth daily  Qty: 90 tablet, Refills: 3    Associated Diagnoses: Palpitations      Multiple vitamin TABS Take by mouth every other day       simvastatin (ZOCOR) 40 MG tablet Take 1 tablet (40 mg) by mouth At Bedtime  Qty: 90 tablet, Refills: 3    Associated Diagnoses: CAD (coronary artery disease); Inferior MI (H)      Denosumab (PROLIA SC) Inject Subcutaneous every 6 months As directed          STOP taking these medications       aspirin 81 MG chewable tablet Comments:   Reason for Stopping:         lisinopril (PRINIVIL/ZESTRIL) 2.5 MG tablet Comments:   Reason for Stopping:                     Consultations:   Consultation during this admission received from internal medicine.  No medical intervention was indicated.             Hospital Course:   The patient was admitted from the emergency room. The patient underwent an uneventful Right femoral neck fracture open reduction and internal rotation. Postoperatively, anticoagulation  with Lovenox was started. No transfusion was required. The patient will be discharged to home. Home medications have been reconciled. Percocet 5/325 was prescribed for pain. Aspirin will be prescribed.             Pending Results:   None           Discharge Instructions and Follow-Up:        Discharge activity:  Activity as tolerated   Discharge follow-up: Follow up with Dr. Kelley in 2 weeks   Outpatient therapy: None    Home Care agency: None    Supplies and equipment: None        Wound care: dry dressing daily and as needed   Other instructions: None

## 2018-06-04 NOTE — PLAN OF CARE
Problem: Patient Care Overview  Goal: Plan of Care/Patient Progress Review  Discharge Planner PT   Patient plan for discharge: Home with assist as needed  Current status: Pt demonstrates ability to perform bed mobility independently, good sitting balance at EOB. Pt performs sit to/from stand and stand pivot transfer with FWW modified independent. Pt able to ambulate 300' with FWW modified independent. Pt ascends/descends 3 steps with handrail with SBA. Pt tolerates LE strengthening exercises without complaint.  Barriers to return to prior living situation: None anticipated  Recommendations for discharge: Home with supervision for stairs  Rationale for recommendations: Pt independent with bed mobility, transfers and ambulation with FWW, requires SBA for stair management which family can provide. Anticipate that patient is safe to discharge home with assist as needed.       Entered by: Nayana Meyer 06/04/2018 8:53 AM      Physical Therapy Discharge Summary    Reason for therapy discharge:    Discharged to home.    Progress towards therapy goal(s). See goals on Care Plan in [facility] electronic health record for goal details.  Goals met    Therapy recommendation(s):    Continue home exercise program.

## 2018-06-04 NOTE — PLAN OF CARE
Problem: Patient Care Overview  Goal: Plan of Care/Patient Progress Review  Outcome: Improving  Pt is AAOx4, VSS, SBA to BR, CMS intact, dressing CDI. Progressing per plan of care. Pending dc home.

## 2018-08-17 ENCOUNTER — OFFICE VISIT (OUTPATIENT)
Dept: CARDIOLOGY | Facility: CLINIC | Age: 63
End: 2018-08-17
Payer: COMMERCIAL

## 2018-08-17 VITALS
BODY MASS INDEX: 19.07 KG/M2 | DIASTOLIC BLOOD PRESSURE: 82 MMHG | HEIGHT: 68 IN | WEIGHT: 125.8 LBS | HEART RATE: 60 BPM | SYSTOLIC BLOOD PRESSURE: 144 MMHG

## 2018-08-17 DIAGNOSIS — R00.2 PALPITATIONS: ICD-10-CM

## 2018-08-17 DIAGNOSIS — I25.10 CORONARY ARTERY DISEASE INVOLVING NATIVE CORONARY ARTERY OF NATIVE HEART WITHOUT ANGINA PECTORIS: ICD-10-CM

## 2018-08-17 DIAGNOSIS — I21.19 INFERIOR MI (H): ICD-10-CM

## 2018-08-17 LAB
CHOLEST SERPL-MCNC: 173 MG/DL
HDLC SERPL-MCNC: 69 MG/DL
LDLC SERPL CALC-MCNC: 89 MG/DL
NONHDLC SERPL-MCNC: 104 MG/DL
TRIGL SERPL-MCNC: 77 MG/DL

## 2018-08-17 PROCEDURE — 36415 COLL VENOUS BLD VENIPUNCTURE: CPT | Performed by: NURSE PRACTITIONER

## 2018-08-17 PROCEDURE — 80061 LIPID PANEL: CPT | Performed by: NURSE PRACTITIONER

## 2018-08-17 PROCEDURE — 99214 OFFICE O/P EST MOD 30 MIN: CPT | Performed by: INTERNAL MEDICINE

## 2018-08-17 RX ORDER — LISINOPRIL 2.5 MG/1
2.5 TABLET ORAL DAILY
COMMUNITY
End: 2018-08-17

## 2018-08-17 RX ORDER — METOPROLOL SUCCINATE 25 MG/1
25 TABLET, EXTENDED RELEASE ORAL AT BEDTIME
Qty: 90 TABLET | Refills: 3 | Status: SHIPPED | OUTPATIENT
Start: 2018-08-17 | End: 2019-08-26

## 2018-08-17 RX ORDER — LISINOPRIL 2.5 MG/1
2.5 TABLET ORAL DAILY
Qty: 90 TABLET | Refills: 3 | Status: SHIPPED | OUTPATIENT
Start: 2018-08-17 | End: 2020-03-31

## 2018-08-17 RX ORDER — SIMVASTATIN 40 MG
40 TABLET ORAL AT BEDTIME
Qty: 90 TABLET | Refills: 3 | Status: SHIPPED | OUTPATIENT
Start: 2018-08-17 | End: 2019-02-22

## 2018-08-17 NOTE — LETTER
8/17/2018    Keya CHOUDHARY Handler  [facility] [address] [facility] [place] [address]    RE: Robin Cazares       Dear Colleague,    I had the pleasure of seeing Robin Cazares in the [facility].    HPI and Plan:   See dictation    No orders of the defined types were placed in this encounter.      Orders Placed This Encounter   Medications     DISCONTD: lisinopril (PRINIVIL/ZESTRIL) 2.5 MG tablet     Sig: Take 2.5 mg by mouth daily     metoprolol succinate (TOPROL-XL) 25 MG 24 hr tablet     Sig: Take 1 tablet (25 mg) by mouth At Bedtime     Dispense:  90 tablet     Refill:  3     simvastatin (ZOCOR) 40 MG tablet     Sig: Take 1 tablet (40 mg) by mouth At Bedtime     Dispense:  90 tablet     Refill:  3     lisinopril (PRINIVIL/ZESTRIL) 2.5 MG tablet     Sig: Take 1 tablet (2.5 mg) by mouth daily     Dispense:  90 tablet     Refill:  3       Medications Discontinued During This Encounter   Medication Reason     order for DME Medication Reconciliation Clean Up     metoprolol (TOPROL-XL) 25 MG 24 hr tablet Reorder     simvastatin (ZOCOR) 40 MG tablet Reorder     lisinopril (PRINIVIL/ZESTRIL) 2.5 MG tablet Reorder         Encounter Diagnoses   Name Primary?     Coronary artery disease involving native coronary artery of native heart without angina pectoris      Palpitations      Inferior MI (H)        CURRENT MEDICATIONS:  Current Outpatient Prescriptions   Medication Sig Dispense Refill     lisinopril (PRINIVIL/ZESTRIL) 2.5 MG tablet Take 1 tablet (2.5 mg) by mouth daily 90 tablet 3     metoprolol succinate (TOPROL-XL) 25 MG 24 hr tablet Take 1 tablet (25 mg) by mouth At Bedtime 90 tablet 3     simvastatin (ZOCOR) 40 MG tablet Take 1 tablet (40 mg) by mouth At Bedtime 90 tablet 3     aspirin 325 MG EC tablet Take one Aspirin tab twice daily for 5 weeks. 70 tablet 0     calcium carb 1250 mg, 500 mg [place],/vitamin D 200 unit (OSCAL 500/200 D-3) 500-200 MG-UNIT per  tablet Take 1 tablet by mouth every other day        Denosumab (PROLIA SC) Inject Subcutaneous every 6 months As directed        Multiple vitamin TABS Take by mouth every other day        oxyCODONE-acetaminophen (PERCOCET) 5-325 MG per tablet Take 1-2 tablets by mouth every 4 hours as needed for severe pain (Patient not taking: Reported on 8/17/2018) 60 tablet 0     [DISCONTINUED] lisinopril (PRINIVIL/ZESTRIL) 2.5 MG tablet Take 2.5 mg by mouth daily       [DISCONTINUED] metoprolol (TOPROL-XL) 25 MG 24 hr tablet Take 1 tablet (25 mg) by mouth daily (Patient taking differently: Take 25 mg by mouth At Bedtime ) 90 tablet 3     [DISCONTINUED] simvastatin (ZOCOR) 40 MG tablet Take 1 tablet (40 mg) by mouth At Bedtime 90 tablet 3       ALLERGIES     Allergies   Allergen Reactions     Fosamax [Alendronic Acid] Palpitations       PAST MEDICAL HISTORY:  Past Medical History:   Diagnosis Date     CAD (coronary artery disease)     8/2008 angioplasty occluded distal OM branch     Hyperlipidemia      Internal hemorrhoids      MI (myocardial infarction)     2008     Osteopenia      Osteoporosis      Palpitations      S/P breast lumpectomy      Vaginal bleeding        PAST SURGICAL HISTORY:  Past Surgical History:   Procedure Laterality Date     HEART CATH, ANGIOPLASTY      8/2008 angioplasty occluded distal OM branch     OPEN REDUCTION INTERNAL FIXATION FEMUR PROXIMAL Right 6/2/2018    Procedure: OPEN REDUCTION INTERNAL FIXATION FEMUR PROXIMAL;  OPEN REDUCTION INTERNAL FIXATION RIGHT FEMUR FRACTURE;  Surgeon: Perico Zapata MD;  Location:  OR       FAMILY HISTORY:  Family History   Problem Relation Age of Onset     HEART DISEASE Father      Leukemia Mother      HEART DISEASE Other        SOCIAL HISTORY:  Social History     Social History     Marital status:      Spouse name: N/A     Number of children: N/A     Years of education: N/A     Social History Main Topics     Smoking status: Never Smoker      "Smokeless tobacco: Never Used     Alcohol use 0.0 oz/week     0 Standard drinks or equivalent per week      Comment: rare      Drug use: No     Sexual activity: Not Asked     Other Topics Concern     Caffeine Concern No     rare     Sleep Concern Yes     Stress Concern No     Weight Concern No     Special Diet No     Exercise No     walks 5-7 days week     Social History Narrative       Review of Systems:  Skin:  Negative       Eyes:  Positive for glasses    ENT:  Negative      Respiratory:  Negative       Cardiovascular:  Negative      Gastroenterology: Negative for      Genitourinary:  Negative for      Musculoskeletal:  Negative      Neurologic:  Positive for headaches occas.   Psychiatric:  Positive for sleep disturbances    Heme/Lymph/Imm:  Negative      Endocrine:  Negative        Physical Exam:  Vitals: /82 (BP Location: Right arm, Patient Position: Sitting, Cuff Size: Adult Regular)  Pulse 60  Ht 1.715 m (5' 7.5\")  Wt 57.1 kg (125 lb 12.8 oz)  Breastfeeding? No  BMI 19.41 kg/m2    Constitutional:  cooperative, alert and oriented, well developed, well nourished, in no acute distress        Skin:  warm and dry to the touch          Head:  normocephalic, no masses or lesions        Eyes:  pupils equal and round        Lymph:      ENT:  no pallor or cyanosis, dentition good        Neck:  carotid pulses are full and equal bilaterally        Respiratory:  clear to auscultation         Cardiac: regular rhythm;no murmurs, gallops or rubs detected                pulses full and equal, no bruits auscultated                                        GI:  abdomen soft;no bruits        Extremities and Muscular Skeletal:  no deformities, clubbing, cyanosis, erythema observed              Neurological:  no gross motor deficits        Psych:  Alert and Oriented x 3          CC  eTrri Morrison, APRN CNP  [address]  [place] [address]                    Thank you for allowing me to participate in " the care of your patient.      Sincerely,     Ginny Baldwin, DO     [facility]    cc:   Terri Morrison, APRN CNP  [address]  [place] [address]

## 2018-08-17 NOTE — PROGRESS NOTES
HPI and Plan:   See dictation    No orders of the defined types were placed in this encounter.      Orders Placed This Encounter   Medications     DISCONTD: lisinopril (PRINIVIL/ZESTRIL) 2.5 MG tablet     Sig: Take 2.5 mg by mouth daily     metoprolol succinate (TOPROL-XL) 25 MG 24 hr tablet     Sig: Take 1 tablet (25 mg) by mouth At Bedtime     Dispense:  90 tablet     Refill:  3     simvastatin (ZOCOR) 40 MG tablet     Sig: Take 1 tablet (40 mg) by mouth At Bedtime     Dispense:  90 tablet     Refill:  3     lisinopril (PRINIVIL/ZESTRIL) 2.5 MG tablet     Sig: Take 1 tablet (2.5 mg) by mouth daily     Dispense:  90 tablet     Refill:  3       Medications Discontinued During This Encounter   Medication Reason     order for DME Medication Reconciliation Clean Up     metoprolol (TOPROL-XL) 25 MG 24 hr tablet Reorder     simvastatin (ZOCOR) 40 MG tablet Reorder     lisinopril (PRINIVIL/ZESTRIL) 2.5 MG tablet Reorder         Encounter Diagnoses   Name Primary?     Coronary artery disease involving native coronary artery of native heart without angina pectoris      Palpitations      Inferior MI (H)        CURRENT MEDICATIONS:  Current Outpatient Prescriptions   Medication Sig Dispense Refill     lisinopril (PRINIVIL/ZESTRIL) 2.5 MG tablet Take 1 tablet (2.5 mg) by mouth daily 90 tablet 3     metoprolol succinate (TOPROL-XL) 25 MG 24 hr tablet Take 1 tablet (25 mg) by mouth At Bedtime 90 tablet 3     simvastatin (ZOCOR) 40 MG tablet Take 1 tablet (40 mg) by mouth At Bedtime 90 tablet 3     aspirin 325 MG EC tablet Take one Aspirin tab twice daily for 5 weeks. 70 tablet 0     calcium carb 1250 mg, 500 mg [place],/vitamin D 200 unit (OSCAL 500/200 D-3) 500-200 MG-UNIT per tablet Take 1 tablet by mouth every other day        Denosumab (PROLIA SC) Inject Subcutaneous every 6 months As directed        Multiple vitamin TABS Take by mouth every other day        oxyCODONE-acetaminophen (PERCOCET) 5-325 MG per tablet Take 1-2  tablets by mouth every 4 hours as needed for severe pain (Patient not taking: Reported on 8/17/2018) 60 tablet 0     [DISCONTINUED] lisinopril (PRINIVIL/ZESTRIL) 2.5 MG tablet Take 2.5 mg by mouth daily       [DISCONTINUED] metoprolol (TOPROL-XL) 25 MG 24 hr tablet Take 1 tablet (25 mg) by mouth daily (Patient taking differently: Take 25 mg by mouth At Bedtime ) 90 tablet 3     [DISCONTINUED] simvastatin (ZOCOR) 40 MG tablet Take 1 tablet (40 mg) by mouth At Bedtime 90 tablet 3       ALLERGIES     Allergies   Allergen Reactions     Fosamax [Alendronic Acid] Palpitations       PAST MEDICAL HISTORY:  Past Medical History:   Diagnosis Date     CAD (coronary artery disease)     8/2008 angioplasty occluded distal OM branch     Hyperlipidemia      Internal hemorrhoids      MI (myocardial infarction)     2008     Osteopenia      Osteoporosis      Palpitations      S/P breast lumpectomy      Vaginal bleeding        PAST SURGICAL HISTORY:  Past Surgical History:   Procedure Laterality Date     HEART CATH, ANGIOPLASTY      8/2008 angioplasty occluded distal OM branch     OPEN REDUCTION INTERNAL FIXATION FEMUR PROXIMAL Right 6/2/2018    Procedure: OPEN REDUCTION INTERNAL FIXATION FEMUR PROXIMAL;  OPEN REDUCTION INTERNAL FIXATION RIGHT FEMUR FRACTURE;  Surgeon: Perico Zapata MD;  Location:  OR       FAMILY HISTORY:  Family History   Problem Relation Age of Onset     HEART DISEASE Father      Leukemia Mother      HEART DISEASE Other        SOCIAL HISTORY:  Social History     Social History     Marital status:      Spouse name: N/A     Number of children: N/A     Years of education: N/A     Social History Main Topics     Smoking status: Never Smoker     Smokeless tobacco: Never Used     Alcohol use 0.0 oz/week     0 Standard drinks or equivalent per week      Comment: rare      Drug use: No     Sexual activity: Not Asked     Other Topics Concern     Caffeine Concern No     rare     Sleep Concern Yes      "Stress Concern No     Weight Concern No     Special Diet No     Exercise No     walks 5-7 days week     Social History Narrative       Review of Systems:  Skin:  Negative       Eyes:  Positive for glasses    ENT:  Negative      Respiratory:  Negative       Cardiovascular:  Negative      Gastroenterology: Negative for      Genitourinary:  Negative for      Musculoskeletal:  Negative      Neurologic:  Positive for headaches occas.   Psychiatric:  Positive for sleep disturbances    Heme/Lymph/Imm:  Negative      Endocrine:  Negative        Physical Exam:  Vitals: /82 (BP Location: Right arm, Patient Position: Sitting, Cuff Size: Adult Regular)  Pulse 60  Ht 1.715 m (5' 7.5\")  Wt 57.1 kg (125 lb 12.8 oz)  Breastfeeding? No  BMI 19.41 kg/m2    Constitutional:  cooperative, alert and oriented, well developed, well nourished, in no acute distress        Skin:  warm and dry to the touch          Head:  normocephalic, no masses or lesions        Eyes:  pupils equal and round        Lymph:      ENT:  no pallor or cyanosis, dentition good        Neck:  carotid pulses are full and equal bilaterally        Respiratory:  clear to auscultation         Cardiac: regular rhythm;no murmurs, gallops or rubs detected                pulses full and equal, no bruits auscultated                                        GI:  abdomen soft;no bruits        Extremities and Muscular Skeletal:  no deformities, clubbing, cyanosis, erythema observed              Neurological:  no gross motor deficits        Psych:  Alert and Oriented x 3          CC  Terri Morrison, APRN CNP  [address]  [place] [address]                  "

## 2018-08-17 NOTE — LETTER
8/17/2018      [facility] [address] [facility] [address]      RE: Edouardmaria g ACOSTA Sage       Dear Colleague,    I had the pleasure of seeing Robin Cazares in the [facility].    Service Date: 08/17/2018      REFERRING PHYSICIAN:  Dr. Sandra      HISTORY OF PRESENT ILLNESS:  Ms. Cazares is a very pleasant 63-year-old female with a history of coronary disease.  She had an inferior wall myocardial infarction remotely back in 2008 and underwent revascularization of her right coronary artery.  She does have a small scar from this but preserved LV systolic function.  Last year, we did a stress echo to monitor for progression of ischemia.  She had been complaining of some palpitations.  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  She was seen by Electrophysiology and they had recommended continued beta blocker for this.  She did have a followup Zio Patch monitor after starting the beta blocker to assess for any reoccurrence and there was no evidence of nonsustained or sustained VT on the Zio Patch monitor.  She is here for her annual followup visit.  She does continue to take metoprolol at the dose prescribed by Dr. Maria.  She is not sure whether it helps or not; however, she did break her femur after tripping over a dog in June and she has been laid up for quite some time now, so she has not been very mobile or exercising.  During the time she was in the hospital for her femur fracture, she was taken off of both lisinopril and metoprolol due to slow pulse and low heart rate.  She states the nurse's staff would wake her up during the night because her heart rate would dip into the 30s.  She did not really sound like she was having any symptoms related to this and was more annoyed by them waking her up.  She did restart both these medications once she was discharged and she seems to be tolerating them.   She denies any lightheadedness, dizziness or presyncopal symptoms.  She has noticed that her blood pressure has gone up a little bit since she started taking metoprolol and wondered if this is because of the medication.        PHYSICAL EXAMINATION:     VITALS SIGNS:  Today in office, her blood pressure is 144/82.  She is not monitoring this on her own at home but during office visits, has noticed it has been in the low 140s.  Her pulse was 60, her weight 125, body mass index is at 19.   CARDIOVASCULAR:  Tones today were regular.  I do not appreciate a murmur, gallop or rub.   LUNGS:  Clear posteriorly.   EXTREMITIES:  She has no peripheral edema on exam.      In summary, Ms. Cazares is a very pleasant 63-year-old female with a history of inferior wall myocardial infarction with small scar but preserved LV systolic function.  She had a PCI to her right coronary artery in 2008.  She had a stress test last year that did not demonstrate evidence for ischemia; however, she did develop nonsustained ventricular tachycardia.  Was seen by Electrophysiology who felt it was related to RVOT VT and unrelated to malignant VT from her small inferior scar.  He felt a low-dose beta blocker would be appropriate and she continues to take this and has not noted any improvement or change at all either way, although I do note that she has been laid up for the last 3 months with this femur fracture.  She does admit to this, so it is difficult to know whether this is helpful to her.  It is certainly not causing her any side effect or symptoms even though they noted the significantly low pulse while she was sleeping during the hospital visit for her femur fracture.  We talked about trying to reduce the dose again as she increases her activity and seeing if she has any recurrent palpitations or if she can get away with a lower dose.  She is not sure if she wants to do this.  Right now, she is pretty comfortable with what she is on.  We also  talked about trying a different beta blocker because her blood pressure did seem to go up a little bit but once again, because she seems to be tolerating this well, she does not want to change anything right now which I think is appropriate.  So, I will plan to see her back in a year unless she has any recurrent symptoms or needs evaluation sooner.        Please feel free to contact me with any questions you have in regards to her care.      cc:   Vanessa Handler, MD [facility]   [address] [place] [address]         IMELDA ROD DO             D: 2018   T: 2018   MT: AUGUSTUS      Name:     RAS CASTILLO   MRN:      [id number]        Account:      [id number]   :      1955           Service Date: 2018      Document: [id number]         Outpatient Encounter Prescriptions as of 2018   Medication Sig Dispense Refill     lisinopril (PRINIVIL/ZESTRIL) 2.5 MG tablet Take 1 tablet (2.5 mg) by mouth daily 90 tablet 3     metoprolol succinate (TOPROL-XL) 25 MG 24 hr tablet Take 1 tablet (25 mg) by mouth At Bedtime 90 tablet 3     simvastatin (ZOCOR) 40 MG tablet Take 1 tablet (40 mg) by mouth At Bedtime 90 tablet 3     aspirin 325 MG EC tablet Take one Aspirin tab twice daily for 5 weeks. 70 tablet 0     calcium carb 1250 mg, 500 mg [place],/vitamin D 200 unit (OSCAL 500/200 D-3) 500-200 MG-UNIT per tablet Take 1 tablet by mouth every other day        Denosumab (PROLIA SC) Inject Subcutaneous every 6 months As directed        Multiple vitamin TABS Take by mouth every other day        oxyCODONE-acetaminophen (PERCOCET) 5-325 MG per tablet Take 1-2 tablets by mouth every 4 hours as needed for severe pain (Patient not taking: Reported on 2018) 60 tablet 0     [DISCONTINUED] lisinopril (PRINIVIL/ZESTRIL) 2.5 MG tablet Take 2.5 mg by mouth daily       [DISCONTINUED] metoprolol (TOPROL-XL) 25 MG 24 hr tablet Take 1 tablet (25 mg) by mouth daily  (Patient taking differently: Take 25 mg by mouth At Bedtime ) 90 tablet 3     [DISCONTINUED] order for DME Equipment being ordered: Walker Wheels () and Walker ()  Treatment Diagnosis: Right hip fracture 1 each 0     [DISCONTINUED] simvastatin (ZOCOR) 40 MG tablet Take 1 tablet (40 mg) by mouth At Bedtime 90 tablet 3     No facility-administered encounter medications on file as of 8/17/2018.        Again, thank you for allowing me to participate in the care of your patient.      Sincerely,    Ginny Baldwin, DO     [facility]

## 2018-08-17 NOTE — MR AVS SNAPSHOT
"              After Visit Summary   8/17/2018    Robin Cazares    MRN: [id number]           Patient Information     Date Of Birth          1955        Visit Information        Provider Department      8/17/2018 2:15 PM Ginny Baldwin DO [facility]        Today's Diagnoses     Coronary artery disease involving native coronary artery of native heart without angina pectoris        Palpitations        Inferior MI (H)           Follow-ups after your visit        Who to contact     If you have questions or need follow up information about today's clinic visit or your schedule please contact [facility] directly at [phone].  Normal or non-critical lab and imaging results will be communicated to you by MyChart, letter or phone within 4 business days after the clinic has received the results. If you do not hear from us within 7 days, please contact the clinic through MyChart or phone. If you have a critical or abnormal lab result, we will notify you by phone as soon as possible.  Submit refill requests through [employer] or call your pharmacy and they will forward the refill request to us. Please allow 3 business days for your refill to be completed.          Additional Information About Your Visit        Care EveryWhere ID     This is your Care EveryWhere ID. This could be used by other organizations to access your [facility] medical records  [id number]        Your Vitals Were     Pulse Height Breastfeeding? BMI (Body Mass Index)          60 1.715 m (5' 7.5\") No 19.41 kg/m2         Blood Pressure from Last 3 Encounters:   08/17/18 144/82   06/04/18 116/65   11/15/17 120/80    Weight from Last 3 Encounters:   08/17/18 57.1 kg (125 lb 12.8 oz)   06/01/18 56.7 kg (125 lb)   11/15/17 55.3 kg (122 lb)              We Performed the Following     Follow-Up with Cardiologist          Where to get your medicines      These " medications were sent to [facility]  for [facility], [place] - [address]  [address] [facility] [address]     Phone:  [phone]     lisinopril 2.5 MG tablet    metoprolol succinate 25 MG 24 hr tablet    simvastatin 40 MG tablet          Primary Care Provider Office Phone # Fax #    Keya FUNMI Sandra [phone]       [address] [address] [facility] [address]        Equal Access to Services     DEON STOUT : Hadii aad ku hadasho Soomaali, waaxda luqadaha, qaybta kaalmada adeegyada, waxay idiin hayaan adeeg kharash lagareth . So [facility] [phone].    ATENCIÓN: Si habla español, tiene a bishop disposición servicios gratuitos de asistencia lingüística. [facility] [phone].    We comply with applicable federal civil rights laws and [place] laws. We do not discriminate on the basis of race, color, national origin, age, disability, sex, sexual orientation, or gender identity.            Thank you!     Thank you for choosing [facility]  for your care. Our goal is always to provide you with excellent care. Hearing back from our patients is one way we can continue to improve our services. Please take a few minutes to complete the written survey that you may receive in the mail after your visit with us. Thank you!             Your Updated Medication List - Protect others around you: Learn how to safely use, store and throw away your medicines at www.disposemymeds.org.          This list is accurate as of 8/17/18  3:05 PM.  Always use your most recent med list.                   Brand Name Dispense Instructions for use Diagnosis    aspirin 325 MG EC tablet     70 tablet    Take one Aspirin tab twice daily for 5 weeks.    Fracture of hip, right, closed, initial encounter (H)       lisinopril 2.5 MG tablet    PRINIVIL/Zestril    90 tablet    Take 1 tablet (2.5 mg) by mouth daily    Coronary artery  disease involving native coronary artery of native heart without angina pectoris       metoprolol succinate 25 MG 24 hr tablet    TOPROL-XL    90 tablet    Take 1 tablet (25 mg) by mouth At Bedtime    Palpitations       Multiple vitamin Tabs      Take by mouth every other day        OSCAL 500/200 D-3 500-200 MG-UNIT per tablet   Generic drug:  calcium carb 1250 mg (500 mg [place])/vitamin D 200 unit      Take 1 tablet by mouth every other day        oxyCODONE-acetaminophen 5-325 MG per tablet    PERCOCET    60 tablet    Take 1-2 tablets by mouth every 4 hours as needed for severe pain    Fracture of hip, right, closed, initial encounter (H)       PROLIA SC      Inject Subcutaneous every 6 months As directed        simvastatin 40 MG tablet    ZOCOR    90 tablet    Take 1 tablet (40 mg) by mouth At Bedtime    Coronary artery disease involving native coronary artery of native heart without angina pectoris, Inferior MI (H)

## 2018-08-17 NOTE — PROGRESS NOTES
Service Date: 08/17/2018      REFERRING PHYSICIAN:  Dr. Sandra      HISTORY OF PRESENT ILLNESS:  Ms. Cazares is a very pleasant 63-year-old female with a history of coronary disease.  She had an inferior wall myocardial infarction remotely back in 2008 and underwent revascularization of her right coronary artery.  She does have a small scar from this but preserved LV systolic function.  Last year, we did a stress echo to monitor for progression of ischemia.  She had been complaining of some palpitations.  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  She was seen by Electrophysiology and they had recommended continued beta blocker for this.  She did have a followup Zio Patch monitor after starting the beta blocker to assess for any reoccurrence and there was no evidence of nonsustained or sustained VT on the Zio Patch monitor.  She is here for her annual followup visit.  She does continue to take metoprolol at the dose prescribed by Dr. Maria.  She is not sure whether it helps or not; however, she did break her femur after tripping over a dog in June and she has been laid up for quite some time now, so she has not been very mobile or exercising.  During the time she was in the hospital for her femur fracture, she was taken off of both lisinopril and metoprolol due to slow pulse and low heart rate.  She states the nurse's staff would wake her up during the night because her heart rate would dip into the 30s.  She did not really sound like she was having any symptoms related to this and was more annoyed by them waking her up.  She did restart both these medications once she was discharged and she seems to be tolerating them.  She denies any lightheadedness, dizziness or presyncopal symptoms.  She has noticed that her blood pressure has gone up a little bit since she started taking metoprolol and wondered if this is because of the medication.        PHYSICAL EXAMINATION:     VITALS  SIGNS:  Today in office, her blood pressure is 144/82.  She is not monitoring this on her own at home but during office visits, has noticed it has been in the low 140s.  Her pulse was 60, her weight 125, body mass index is at 19.   CARDIOVASCULAR:  Tones today were regular.  I do not appreciate a murmur, gallop or rub.   LUNGS:  Clear posteriorly.   EXTREMITIES:  She has no peripheral edema on exam.      In summary, Ms. Cazares is a very pleasant 63-year-old female with a history of inferior wall myocardial infarction with small scar but preserved LV systolic function.  She had a PCI to her right coronary artery in 2008.  She had a stress test last year that did not demonstrate evidence for ischemia; however, she did develop nonsustained ventricular tachycardia.  Was seen by Electrophysiology who felt it was related to RVOT VT and unrelated to malignant VT from her small inferior scar.  He felt a low-dose beta blocker would be appropriate and she continues to take this and has not noted any improvement or change at all either way, although I do note that she has been laid up for the last 3 months with this femur fracture.  She does admit to this, so it is difficult to know whether this is helpful to her.  It is certainly not causing her any side effect or symptoms even though they noted the significantly low pulse while she was sleeping during the hospital visit for her femur fracture.  We talked about trying to reduce the dose again as she increases her activity and seeing if she has any recurrent palpitations or if she can get away with a lower dose.  She is not sure if she wants to do this.  Right now, she is pretty comfortable with what she is on.  We also talked about trying a different beta blocker because her blood pressure did seem to go up a little bit but once again, because she seems to be tolerating this well, she does not want to change anything right now which I think is appropriate.  So, I will plan  to see her back in a year unless she has any recurrent symptoms or needs evaluation sooner.        Please feel free to contact me with any questions you have in regards to her care.      cc:   Keya Sandra MD    [facility] [place]   [address] [place] [address]         IMELDA ROD DO             D: 2018   T: 2018   MT: AUGUSTUS      Name:     RAS CASTILLO   MRN:      [id number]        Account:      [id number]   :      1955           Service Date: 2018      Document: [id number]

## 2019-02-22 DIAGNOSIS — I25.10 CORONARY ARTERY DISEASE INVOLVING NATIVE CORONARY ARTERY OF NATIVE HEART WITHOUT ANGINA PECTORIS: ICD-10-CM

## 2019-02-22 DIAGNOSIS — I21.19 INFERIOR MI (H): ICD-10-CM

## 2019-02-22 RX ORDER — SIMVASTATIN 40 MG
40 TABLET ORAL AT BEDTIME
Qty: 90 TABLET | Refills: 1 | Status: SHIPPED | OUTPATIENT
Start: 2019-02-22 | End: 2019-10-08

## 2019-08-26 ENCOUNTER — TELEPHONE (OUTPATIENT)
Dept: CARDIOLOGY | Facility: CLINIC | Age: 64
End: 2019-08-26

## 2019-08-26 DIAGNOSIS — R00.2 PALPITATIONS: ICD-10-CM

## 2019-08-26 RX ORDER — METOPROLOL SUCCINATE 25 MG/1
25 TABLET, EXTENDED RELEASE ORAL AT BEDTIME
Qty: 90 TABLET | Refills: 0 | Status: SHIPPED | OUTPATIENT
Start: 2019-08-26 | End: 2019-10-08

## 2019-08-26 NOTE — TELEPHONE ENCOUNTER
Received call from pt stating she is scheduled for her annual follow up with Dr. Baldwin in October, however she is almost out of metoprolol and only has 2 tablets left. Refill sent to pt's preferred pharmacy for 90 day supply and 0 refills. Pt stated she does not need any other refills at this time.

## 2019-10-08 ENCOUNTER — OFFICE VISIT (OUTPATIENT)
Dept: CARDIOLOGY | Facility: CLINIC | Age: 64
End: 2019-10-08
Payer: COMMERCIAL

## 2019-10-08 VITALS
WEIGHT: 134.1 LBS | HEIGHT: 68 IN | SYSTOLIC BLOOD PRESSURE: 126 MMHG | BODY MASS INDEX: 20.32 KG/M2 | DIASTOLIC BLOOD PRESSURE: 70 MMHG | HEART RATE: 62 BPM

## 2019-10-08 DIAGNOSIS — I21.19 INFERIOR MI (H): ICD-10-CM

## 2019-10-08 DIAGNOSIS — R00.2 PALPITATIONS: ICD-10-CM

## 2019-10-08 DIAGNOSIS — I25.10 CORONARY ARTERY DISEASE INVOLVING NATIVE CORONARY ARTERY OF NATIVE HEART WITHOUT ANGINA PECTORIS: Primary | ICD-10-CM

## 2019-10-08 PROCEDURE — 99214 OFFICE O/P EST MOD 30 MIN: CPT | Performed by: INTERNAL MEDICINE

## 2019-10-08 RX ORDER — METOPROLOL SUCCINATE 25 MG/1
25 TABLET, EXTENDED RELEASE ORAL AT BEDTIME
Qty: 90 TABLET | Refills: 3 | Status: SHIPPED | OUTPATIENT
Start: 2019-10-08 | End: 2020-09-30

## 2019-10-08 RX ORDER — SIMVASTATIN 40 MG
40 TABLET ORAL AT BEDTIME
Qty: 30 TABLET | Refills: 0 | Status: SHIPPED | OUTPATIENT
Start: 2019-10-08 | End: 2019-10-08

## 2019-10-08 RX ORDER — SIMVASTATIN 40 MG
40 TABLET ORAL AT BEDTIME
Qty: 90 TABLET | Refills: 3 | Status: SHIPPED | OUTPATIENT
Start: 2019-10-08 | End: 2020-09-30

## 2019-10-08 ASSESSMENT — MIFFLIN-ST. JEOR: SCORE: 1193.83

## 2019-10-08 NOTE — LETTER
10/8/2019    [facility]  [address]  [facility] [address]    RE: Robin Cazares       Dear Colleague,    I had the pleasure of seeing Robin Cazares in the [facility].    HPI and Plan:   See dictation    No orders of the defined types were placed in this encounter.      Orders Placed This Encounter   Medications     aspirin (ASPIRIN) 81 MG EC tablet     Sig: Take 81 mg by mouth daily     DISCONTD: simvastatin (ZOCOR) 40 MG tablet     Sig: Take 1 tablet (40 mg) by mouth At Bedtime     Dispense:  30 tablet     Refill:  0     simvastatin (ZOCOR) 40 MG tablet     Sig: Take 1 tablet (40 mg) by mouth At Bedtime     Dispense:  90 tablet     Refill:  3     metoprolol succinate ER (TOPROL-XL) 25 MG 24 hr tablet     Sig: Take 1 tablet (25 mg) by mouth At Bedtime     Dispense:  90 tablet     Refill:  3     DO NOT FILL UNTIL REQUESTED       Medications Discontinued During This Encounter   Medication Reason     aspirin 325 MG EC tablet Dose adjustment     simvastatin (ZOCOR) 40 MG tablet Reorder     simvastatin (ZOCOR) 40 MG tablet Reorder     metoprolol succinate ER (TOPROL-XL) 25 MG 24 hr tablet Reorder         Encounter Diagnoses   Name Primary?     Coronary artery disease involving native coronary artery of native heart without angina pectoris Yes     Inferior MI (H)      Palpitations        CURRENT MEDICATIONS:  Current Outpatient Medications   Medication Sig Dispense Refill     aspirin (ASPIRIN) 81 MG EC tablet Take 81 mg by mouth daily       calcium carb 1250 mg, 500 mg [place],/vitamin D 200 unit (OSCAL 500/200 D-3) 500-200 MG-UNIT per tablet Take 1 tablet by mouth every other day        Denosumab (PROLIA SC) Inject Subcutaneous every 6 months As directed        lisinopril (PRINIVIL/ZESTRIL) 2.5 MG tablet Take 1 tablet (2.5 mg) by mouth daily 90 tablet 3     metoprolol succinate ER (TOPROL-XL) 25 MG 24 hr tablet Take 1 tablet (25 mg) by mouth  At Bedtime 90 tablet 3     Multiple vitamin TABS Take by mouth every other day        simvastatin (ZOCOR) 40 MG tablet Take 1 tablet (40 mg) by mouth At Bedtime 90 tablet 3     oxyCODONE-acetaminophen (PERCOCET) 5-325 MG per tablet Take 1-2 tablets by mouth every 4 hours as needed for severe pain (Patient not taking: Reported on 8/17/2018) 60 tablet 0       ALLERGIES     Allergies   Allergen Reactions     Fosamax [Alendronic Acid] Palpitations       PAST MEDICAL HISTORY:  Past Medical History:   Diagnosis Date     CAD (coronary artery disease)     8/2008 angioplasty occluded distal OM branch     Hyperlipidemia      Internal hemorrhoids      MI (myocardial infarction) (H)     2008     Osteopenia      Osteoporosis      Palpitations      S/P breast lumpectomy      Vaginal bleeding        PAST SURGICAL HISTORY:  Past Surgical History:   Procedure Laterality Date     HEART CATH, ANGIOPLASTY      8/2008 angioplasty occluded distal OM branch     OPEN REDUCTION INTERNAL FIXATION FEMUR PROXIMAL Right 6/2/2018    Procedure: OPEN REDUCTION INTERNAL FIXATION FEMUR PROXIMAL;  OPEN REDUCTION INTERNAL FIXATION RIGHT FEMUR FRACTURE;  Surgeon: Perico Zapata MD;  Location:  OR       FAMILY HISTORY:  Family History   Problem Relation Age of Onset     Heart Disease Father      Leukemia Mother      Heart Disease Other        SOCIAL HISTORY:  Social History     Socioeconomic History     Marital status:      Spouse name: None     Number of children: None     Years of education: None     Highest education level: None   Occupational History     None   Social Needs     Financial resource strain: None     Food insecurity:     Worry: None     Inability: None     Transportation needs:     Medical: None     Non-medical: None   Tobacco Use     Smoking status: Never Smoker     Smokeless tobacco: Never Used   Substance and Sexual Activity     Alcohol use: Yes     Alcohol/week: 0.0 standard drinks     Comment: rare      Drug  "use: No     Sexual activity: None   Lifestyle     Physical activity:     Days per week: None     Minutes per session: None     Stress: None   Relationships     Social connections:     Talks on phone: None     Gets together: None     Attends Taoism service: None     Active member of club or organization: None     Attends meetings of clubs or organizations: None     Relationship status: None     Intimate partner violence:     Fear of current or ex partner: None     Emotionally abused: None     Physically abused: None     Forced sexual activity: None   Other Topics Concern     Parent/sibling w/ CABG, MI or angioplasty before 65F 55M? Not Asked      Service Not Asked     Blood Transfusions Not Asked     Caffeine Concern No     Comment: rare     Occupational Exposure Not Asked     Hobby Hazards Not Asked     Sleep Concern Yes     Stress Concern No     Weight Concern No     Special Diet No     Back Care Not Asked     Exercise No     Comment: walks 5-7 days week     Bike Helmet Not Asked     Seat Belt Not Asked     Self-Exams Not Asked   Social History Narrative     None       Review of Systems:  Skin:  Positive for   dry skin   Eyes:  Positive for glasses    ENT:  Negative      Respiratory:  Negative       Cardiovascular:  Negative;chest pain;syncope or near-syncope;cyanosis;edema;fatigue Positive for;palpitations    Gastroenterology: Negative      Genitourinary:  not assessed      Musculoskeletal:  Positive for back pain    Neurologic:  Positive for migraine headaches    Psychiatric:  Positive for sleep disturbances    Heme/Lymph/Imm:  Positive for easy bruising;allergies    Endocrine:  Negative        Physical Exam:  Vitals: /70   Pulse 62   Ht 1.715 m (5' 7.5\")   Wt 60.8 kg (134 lb 1.6 oz)   BMI 20.69 kg/m       Constitutional:           Skin:             Head:           Eyes:           Lymph:      ENT:           Neck:           Respiratory:            Cardiac:                                       "                     GI:           Extremities and Muscular Skeletal:                 Neurological:           Psych:             CC  Keya Sandra MD  [facility] [address]  [address] [place] [address]                    Thank you for allowing me to participate in the care of your patient.      Sincerely,     Ginny Baldwin,      [facility]    cc:   MD MAURICE [address]  [address] [place] [address]

## 2019-10-08 NOTE — PROGRESS NOTES
HPI and Plan:   See dictation    No orders of the defined types were placed in this encounter.      Orders Placed This Encounter   Medications     aspirin (ASPIRIN) 81 MG EC tablet     Sig: Take 81 mg by mouth daily     DISCONTD: simvastatin (ZOCOR) 40 MG tablet     Sig: Take 1 tablet (40 mg) by mouth At Bedtime     Dispense:  30 tablet     Refill:  0     simvastatin (ZOCOR) 40 MG tablet     Sig: Take 1 tablet (40 mg) by mouth At Bedtime     Dispense:  90 tablet     Refill:  3     metoprolol succinate ER (TOPROL-XL) 25 MG 24 hr tablet     Sig: Take 1 tablet (25 mg) by mouth At Bedtime     Dispense:  90 tablet     Refill:  3     DO NOT FILL UNTIL REQUESTED       Medications Discontinued During This Encounter   Medication Reason     aspirin 325 MG EC tablet Dose adjustment     simvastatin (ZOCOR) 40 MG tablet Reorder     simvastatin (ZOCOR) 40 MG tablet Reorder     metoprolol succinate ER (TOPROL-XL) 25 MG 24 hr tablet Reorder         Encounter Diagnoses   Name Primary?     Coronary artery disease involving native coronary artery of native heart without angina pectoris Yes     Inferior MI (H)      Palpitations        CURRENT MEDICATIONS:  Current Outpatient Medications   Medication Sig Dispense Refill     aspirin (ASPIRIN) 81 MG EC tablet Take 81 mg by mouth daily       calcium carb 1250 mg, 500 mg [place],/vitamin D 200 unit (OSCAL 500/200 D-3) 500-200 MG-UNIT per tablet Take 1 tablet by mouth every other day        Denosumab (PROLIA SC) Inject Subcutaneous every 6 months As directed        lisinopril (PRINIVIL/ZESTRIL) 2.5 MG tablet Take 1 tablet (2.5 mg) by mouth daily 90 tablet 3     metoprolol succinate ER (TOPROL-XL) 25 MG 24 hr tablet Take 1 tablet (25 mg) by mouth At Bedtime 90 tablet 3     Multiple vitamin TABS Take by mouth every other day        simvastatin (ZOCOR) 40 MG tablet Take 1 tablet (40 mg) by mouth At Bedtime 90 tablet 3     oxyCODONE-acetaminophen (PERCOCET) 5-325 MG per tablet Take 1-2 tablets by  mouth every 4 hours as needed for severe pain (Patient not taking: Reported on 8/17/2018) 60 tablet 0       ALLERGIES     Allergies   Allergen Reactions     Fosamax [Alendronic Acid] Palpitations       PAST MEDICAL HISTORY:  Past Medical History:   Diagnosis Date     CAD (coronary artery disease)     8/2008 angioplasty occluded distal OM branch     Hyperlipidemia      Internal hemorrhoids      MI (myocardial infarction) (H)     2008     Osteopenia      Osteoporosis      Palpitations      S/P breast lumpectomy      Vaginal bleeding        PAST SURGICAL HISTORY:  Past Surgical History:   Procedure Laterality Date     HEART CATH, ANGIOPLASTY      8/2008 angioplasty occluded distal OM branch     OPEN REDUCTION INTERNAL FIXATION FEMUR PROXIMAL Right 6/2/2018    Procedure: OPEN REDUCTION INTERNAL FIXATION FEMUR PROXIMAL;  OPEN REDUCTION INTERNAL FIXATION RIGHT FEMUR FRACTURE;  Surgeon: Perico Zapata MD;  Location:  OR       FAMILY HISTORY:  Family History   Problem Relation Age of Onset     Heart Disease Father      Leukemia Mother      Heart Disease Other        SOCIAL HISTORY:  Social History     Socioeconomic History     Marital status:      Spouse name: None     Number of children: None     Years of education: None     Highest education level: None   Occupational History     None   Social Needs     Financial resource strain: None     Food insecurity:     Worry: None     Inability: None     Transportation needs:     Medical: None     Non-medical: None   Tobacco Use     Smoking status: Never Smoker     Smokeless tobacco: Never Used   Substance and Sexual Activity     Alcohol use: Yes     Alcohol/week: 0.0 standard drinks     Comment: rare      Drug use: No     Sexual activity: None   Lifestyle     Physical activity:     Days per week: None     Minutes per session: None     Stress: None   Relationships     Social connections:     Talks on phone: None     Gets together: None     Attends Synagogue  "service: None     Active member of club or organization: None     Attends meetings of clubs or organizations: None     Relationship status: None     Intimate partner violence:     Fear of current or ex partner: None     Emotionally abused: None     Physically abused: None     Forced sexual activity: None   Other Topics Concern     Parent/sibling w/ CABG, MI or angioplasty before 65F 55M? Not Asked      Service Not Asked     Blood Transfusions Not Asked     Caffeine Concern No     Comment: rare     Occupational Exposure Not Asked     Hobby Hazards Not Asked     Sleep Concern Yes     Stress Concern No     Weight Concern No     Special Diet No     Back Care Not Asked     Exercise No     Comment: walks 5-7 days week     Bike Helmet Not Asked     Seat Belt Not Asked     Self-Exams Not Asked   Social History Narrative     None       Review of Systems:  Skin:  Positive for   dry skin   Eyes:  Positive for glasses    ENT:  Negative      Respiratory:  Negative       Cardiovascular:  Negative;chest pain;syncope or near-syncope;cyanosis;edema;fatigue Positive for;palpitations    Gastroenterology: Negative      Genitourinary:  not assessed      Musculoskeletal:  Positive for back pain    Neurologic:  Positive for migraine headaches    Psychiatric:  Positive for sleep disturbances    Heme/Lymph/Imm:  Positive for easy bruising;allergies    Endocrine:  Negative        Physical Exam:  Vitals: /70   Pulse 62   Ht 1.715 m (5' 7.5\")   Wt 60.8 kg (134 lb 1.6 oz)   BMI 20.69 kg/m      Constitutional:           Skin:             Head:           Eyes:           Lymph:      ENT:           Neck:           Respiratory:            Cardiac:                                                           GI:           Extremities and Muscular Skeletal:                 Neurological:           Psych:             CC  Vanessa N Handler, MD [facility]  [address] [place] [address]                  "

## 2019-10-08 NOTE — LETTER
10/8/2019      [facility]  [address] [place]  [facility] [address]      RE: Robin Sewelllivan       Dear Colleague,    I had the pleasure of seeing Robin Cazares in the [facility].    Service Date: 10/08/2019      REFERRING PHYSICIAN:  Dr. Sandra.      HISTORY OF PRESENT ILLNESS:  Ms. Cazares is a pleasant 64-year-old female with a history of coronary disease.  She had an inferior wall myocardial infarction back in 2008 with revascularization of her right coronary artery.  She has a history of an RVOT VT, and she takes a beta blocker for this.  In the last year, she has not had any major health issues.  She does occasionally get palpitations, but they seem to go away when she exercises or is active.  She has been having some occasional chest pains at nighttime that are relieved with deep breathing and just changing position.  She has not had symptoms of heartburn in the past, so she is not sure what that feels like.  It does not happen consistently and has not happened in the last month.  She has been taking her medications without difficulty.  She otherwise feels healthy and is very active.      PHYSICAL EXAMINATION:   VITAL:  On exam, her blood pressure is normotensive, 126/70, pulse is 62, weight 134 with a body mass index of 20.   NECK:  Carotid upstrokes are brisk without bruit.   CARDIOVASCULAR:  Tones are regular without murmur, gallop or rub.   LUNGS:  Clear posteriorly.   EXTREMITIES:  She has strong and symmetric pulses in the upper and lower extremities without peripheral edema.      SUMMARY:  Ms. Cazares is a very pleasant 64-year-old female with a history of coronary artery disease and remote inferior myocardial infarction in 2008 with preserved LV systolic function.  She is stable from a cardiac perspective and doing well.  She is having some rare palpitation symptoms but not associated symptoms with it, such as shortness of  breath or presyncope or syncopal events.  She has occasional atypical chest pains, again occurring very infrequently and do not sound cardiac related.  I have refilled her medications.  Her exam is normal today.  I encouraged her to stay active.  I would be happy to continue to follow her annually or as needed.  Please feel free to contact me with any questions you have in regards to her care.      cc:   Keya Sandra MD   [facility]   [address] [place]  [address]         IMELDA ROD DO             D: 10/08/2019   T: 10/08/2019   MT: OVI      Name:     RAS CASTILLO   MRN:      [id number]        Account:      [id number]   :      1955           Service Date: 10/08/2019      Document: [id number]         Outpatient Encounter Medications as of 10/8/2019   Medication Sig Dispense Refill     aspirin (ASPIRIN) 81 MG EC tablet Take 81 mg by mouth daily       calcium carb 1250 mg, 500 mg [place],/vitamin D 200 unit (OSCAL 500/200 D-3) 500-200 MG-UNIT per tablet Take 1 tablet by mouth every other day        Denosumab (PROLIA SC) Inject Subcutaneous every 6 months As directed        lisinopril (PRINIVIL/ZESTRIL) 2.5 MG tablet Take 1 tablet (2.5 mg) by mouth daily 90 tablet 3     metoprolol succinate ER (TOPROL-XL) 25 MG 24 hr tablet Take 1 tablet (25 mg) by mouth At Bedtime 90 tablet 3     Multiple vitamin TABS Take by mouth every other day        simvastatin (ZOCOR) 40 MG tablet Take 1 tablet (40 mg) by mouth At Bedtime 90 tablet 3     oxyCODONE-acetaminophen (PERCOCET) 5-325 MG per tablet Take 1-2 tablets by mouth every 4 hours as needed for severe pain (Patient not taking: Reported on 2018) 60 tablet 0     [DISCONTINUED] aspirin 325 MG EC tablet Take one Aspirin tab twice daily for 5 weeks. 70 tablet 0     [DISCONTINUED] metoprolol succinate ER (TOPROL-XL) 25 MG 24 hr tablet Take 1 tablet (25 mg) by mouth At Bedtime 90 tablet 0     [DISCONTINUED]  simvastatin (ZOCOR) 40 MG tablet Take 1 tablet (40 mg) by mouth At Bedtime 30 tablet 0     [DISCONTINUED] simvastatin (ZOCOR) 40 MG tablet Take 1 tablet (40 mg) by mouth At Bedtime 90 tablet 1     No facility-administered encounter medications on file as of 10/8/2019.        Again, thank you for allowing me to participate in the care of your patient.      Sincerely,    Ginny Baldwin,      [facility]

## 2019-10-08 NOTE — PROGRESS NOTES
Service Date: 10/08/2019      REFERRING PHYSICIAN:  Dr. Sandra.      HISTORY OF PRESENT ILLNESS:  Ms. Caazres is a pleasant 64-year-old female with a history of coronary disease.  She had an inferior wall myocardial infarction back in 2008 with revascularization of her right coronary artery.  She has a history of an RVOT VT, and she takes a beta blocker for this.  In the last year, she has not had any major health issues.  She does occasionally get palpitations, but they seem to go away when she exercises or is active.  She has been having some occasional chest pains at nighttime that are relieved with deep breathing and just changing position.  She has not had symptoms of heartburn in the past, so she is not sure what that feels like.  It does not happen consistently and has not happened in the last month.  She has been taking her medications without difficulty.  She otherwise feels healthy and is very active.      PHYSICAL EXAMINATION:   VITAL:  On exam, her blood pressure is normotensive, 126/70, pulse is 62, weight 134 with a body mass index of 20.   NECK:  Carotid upstrokes are brisk without bruit.   CARDIOVASCULAR:  Tones are regular without murmur, gallop or rub.   LUNGS:  Clear posteriorly.   EXTREMITIES:  She has strong and symmetric pulses in the upper and lower extremities without peripheral edema.      SUMMARY:  Ms. Cazares is a very pleasant 64-year-old female with a history of coronary artery disease and remote inferior myocardial infarction in 2008 with preserved LV systolic function.  She is stable from a cardiac perspective and doing well.  She is having some rare palpitation symptoms but not associated symptoms with it, such as shortness of breath or presyncope or syncopal events.  She has occasional atypical chest pains, again occurring very infrequently and do not sound cardiac related.  I have refilled her medications.  Her exam is normal today.  I encouraged her to stay active.  I would be  happy to continue to follow her annually or as needed.  Please feel free to contact me with any questions you have in regards to her care.      cc:   Keya Sandra MD   [facility] [address] [place]  [address]         IMELDA ROD DO             D: 10/08/2019   T: 10/08/2019   MT: OVI      Name:     RAS CASTILLO   MRN:      [id number]        Account:      [id number]   :      1955           Service Date: 10/08/2019      Document: [id number]

## 2020-03-31 DIAGNOSIS — I25.10 CORONARY ARTERY DISEASE INVOLVING NATIVE CORONARY ARTERY OF NATIVE HEART WITHOUT ANGINA PECTORIS: ICD-10-CM

## 2020-03-31 RX ORDER — LISINOPRIL 2.5 MG/1
2.5 TABLET ORAL DAILY
Qty: 90 TABLET | Refills: 1 | Status: SHIPPED | OUTPATIENT
Start: 2020-03-31 | End: 2020-09-30

## 2020-09-29 DIAGNOSIS — I25.10 CORONARY ARTERY DISEASE INVOLVING NATIVE CORONARY ARTERY OF NATIVE HEART WITHOUT ANGINA PECTORIS: Primary | ICD-10-CM

## 2020-09-29 DIAGNOSIS — E78.2 MIXED HYPERLIPIDEMIA: ICD-10-CM

## 2020-09-29 NOTE — PROGRESS NOTES
Team 1 notified by scheduling that pt requested labs prior to her annual follow up scheduled on 10/22/20 with DARCIE Carrillo. Pt is prescribed simvastatin 40 mg daily and lisinopril 2.5 mg daily by Dr. Baldwin. Last lab work done on 8/30/19. Orders placed for BMP and FLP.

## 2020-09-30 DIAGNOSIS — I25.10 CORONARY ARTERY DISEASE INVOLVING NATIVE CORONARY ARTERY OF NATIVE HEART WITHOUT ANGINA PECTORIS: ICD-10-CM

## 2020-09-30 DIAGNOSIS — I21.19 INFERIOR MI (H): ICD-10-CM

## 2020-09-30 DIAGNOSIS — R00.2 PALPITATIONS: ICD-10-CM

## 2020-09-30 RX ORDER — METOPROLOL SUCCINATE 25 MG/1
25 TABLET, EXTENDED RELEASE ORAL AT BEDTIME
Qty: 90 TABLET | Refills: 0 | Status: SHIPPED | OUTPATIENT
Start: 2020-09-30 | End: 2020-10-22

## 2020-09-30 RX ORDER — SIMVASTATIN 40 MG
40 TABLET ORAL AT BEDTIME
Qty: 90 TABLET | Refills: 0 | Status: SHIPPED | OUTPATIENT
Start: 2020-09-30 | End: 2020-10-22

## 2020-09-30 RX ORDER — LISINOPRIL 2.5 MG/1
2.5 TABLET ORAL DAILY
Qty: 90 TABLET | Refills: 0 | Status: SHIPPED | OUTPATIENT
Start: 2020-09-30 | End: 2020-10-22

## 2020-10-19 DIAGNOSIS — E78.2 MIXED HYPERLIPIDEMIA: ICD-10-CM

## 2020-10-19 DIAGNOSIS — I25.10 CORONARY ARTERY DISEASE INVOLVING NATIVE CORONARY ARTERY OF NATIVE HEART WITHOUT ANGINA PECTORIS: ICD-10-CM

## 2020-10-19 LAB
ANION GAP SERPL CALCULATED.3IONS-SCNC: 6 MMOL/L (ref 3–14)
BUN SERPL-MCNC: 14 MG/DL (ref 7–30)
CALCIUM SERPL-MCNC: 8.4 MG/DL (ref 8.5–10.1)
CHLORIDE SERPL-SCNC: 108 MMOL/L (ref 94–109)
CHOLEST SERPL-MCNC: 183 MG/DL
CO2 SERPL-SCNC: 26 MMOL/L (ref 20–32)
CREAT SERPL-MCNC: 0.89 MG/DL (ref 0.52–1.04)
GFR SERPL CREATININE-BSD FRML MDRD: 67 ML/MIN/{1.73_M2}
GLUCOSE SERPL-MCNC: 102 MG/DL (ref 70–99)
HDLC SERPL-MCNC: 67 MG/DL
LDLC SERPL CALC-MCNC: 97 MG/DL
NONHDLC SERPL-MCNC: 116 MG/DL
POTASSIUM SERPL-SCNC: 4 MMOL/L (ref 3.4–5.3)
SODIUM SERPL-SCNC: 140 MMOL/L (ref 133–144)
TRIGL SERPL-MCNC: 94 MG/DL

## 2020-10-19 PROCEDURE — 80048 BASIC METABOLIC PNL TOTAL CA: CPT | Performed by: INTERNAL MEDICINE

## 2020-10-19 PROCEDURE — 80061 LIPID PANEL: CPT | Performed by: INTERNAL MEDICINE

## 2020-10-19 PROCEDURE — 36415 COLL VENOUS BLD VENIPUNCTURE: CPT | Performed by: INTERNAL MEDICINE

## 2020-10-22 ENCOUNTER — VIRTUAL VISIT (OUTPATIENT)
Dept: CARDIOLOGY | Facility: CLINIC | Age: 65
End: 2020-10-22
Payer: COMMERCIAL

## 2020-10-22 DIAGNOSIS — R00.2 PALPITATIONS: ICD-10-CM

## 2020-10-22 DIAGNOSIS — I25.10 CORONARY ARTERY DISEASE INVOLVING NATIVE CORONARY ARTERY OF NATIVE HEART WITHOUT ANGINA PECTORIS: ICD-10-CM

## 2020-10-22 DIAGNOSIS — I21.19 INFERIOR MI (H): ICD-10-CM

## 2020-10-22 PROCEDURE — 99213 OFFICE O/P EST LOW 20 MIN: CPT | Mod: 95 | Performed by: PHYSICIAN ASSISTANT

## 2020-10-22 RX ORDER — LISINOPRIL 2.5 MG/1
2.5 TABLET ORAL DAILY
Qty: 90 TABLET | Refills: 3 | Status: SHIPPED | OUTPATIENT
Start: 2020-10-22 | End: 2021-06-29

## 2020-10-22 RX ORDER — SIMVASTATIN 40 MG
40 TABLET ORAL AT BEDTIME
Qty: 90 TABLET | Refills: 3 | Status: SHIPPED | OUTPATIENT
Start: 2020-10-22 | End: 2021-06-29

## 2020-10-22 RX ORDER — METOPROLOL SUCCINATE 25 MG/1
25 TABLET, EXTENDED RELEASE ORAL AT BEDTIME
Qty: 90 TABLET | Refills: 3 | Status: SHIPPED | OUTPATIENT
Start: 2020-10-22 | End: 2021-06-29

## 2020-10-22 NOTE — PROGRESS NOTES
CARDIOLOGY CLINIC VIDEO VISIT  This visit is being conducted as a virtual visit due to the emphasis on mitigation of the COVID-19 virus pandemic. The clinician has decided that the risk of an in-office visit outweighs the benefit for this patient. The rest of the comprehensive physical examination is deferred due to public health emergency video visit restrictions.      Robin Cazares is a 65 year old female who is being evaluated via a billable video visit.      The patient has been notified of following:     This video visit will be conducted via a call between you and your physician/provider. We have found that certain health care needs can be provided without the need for an in-person physical exam.  This service lets us provide the care you need with a video conversation.  If a prescription is necessary we can send it directly to your pharmacy.  If lab work is needed we can place an order for that and you can then stop by our lab to have the test done at a later time.    Virtual visits are billed at different rates depending on your insurance coverage. During this emergency period, for some insurers they may be billed the same as an in-person visit.  Please reach out to your insurance provider with any questions.    If during the course of the call the physician/provider feels a video visit is not appropriate, you will not be charged for this service.      Physician has received verbal consent for a Video Visit from the patient? Yes    Patient would like the video invitation sent by: Text to cell phone: [phone]      MA ROS:  Skin: negative  Eyes: negative  Ears/Nose/Throat: negative  Respiratory: Cough-  Cardiovascular: palpitations and chest pain  Gastrointestinal: negative  Genitourinary: negative  Musculoskeletal: negative  Neurologic: migraine headaches  Psychiatric: sleep disturbance  Hematologic/Lymphatic/Immunologic: negative  Endocrine: negative        Self reported vitals:  Weight: 135  BP not  taken  HR not taken  George BORREGO        Primary Cardiologist: Dr. Baldwin    HPI:  Robin Cazares is a 65 year old female with past medical history including coronary disease.  She had an inferior wall myocardial infarction back in 2008 with revascularization of her right coronary artery.  She has a history of an RVOT VT, and she takes a beta blocker for this.      In the last year, she has not had any major health issues.  She does occasionally get palpitations, but they seem to go away when she exercises or is active.  She Continues to have occasional chest pains at nighttime that are relieved with deep breathing and changing position.  These occur maybe once or twice a month.  She has not had symptoms of heartburn in the past, so she is not sure what that feels like.  She has been taking her medications without difficulty.  She otherwise feels healthy and is very active.   She is not going to the gym right now with COVID.  She tries to do some exercises while watching TV.  In nicer weather, she bikes.  She shoveled the other day and no chest pain or palpitations with this.  Actually she feels better with exercise.     She had her BP checked twice at the rheumatology visit, last in June. They have not indicated that it was a problem.      Weight is stable from last year.     I have reviewed and updated the patient's Past Medical History, Social History, Family History and Medication List.    PROBLEM LIST:  Patient Active Problem List   Diagnosis     CAD (coronary artery disease)     MI (myocardial infarction) (H)     Hyperlipidemia     Palpitations     Internal hemorrhoids     Osteopenia     Osteoporosis     Closed fracture of neck of right femur (H)     Fracture of neck of right femur (H)     Intertrochanteric fracture of right femur (H)       MEDICATIONS:  Current Outpatient Medications   Medication Sig Dispense Refill     aspirin (ASPIRIN) 81 MG EC tablet Take 81 mg by mouth daily       calcium carb 1250 mg,  500 mg [place],/vitamin D 200 unit (OSCAL 500/200 D-3) 500-200 MG-UNIT per tablet Take 1 tablet by mouth every other day        Denosumab (PROLIA SC) Inject Subcutaneous every 6 months As directed        lisinopril (ZESTRIL) 2.5 MG tablet Take 1 tablet (2.5 mg) by mouth daily 90 tablet 0     metoprolol succinate ER (TOPROL-XL) 25 MG 24 hr tablet Take 1 tablet (25 mg) by mouth At Bedtime 90 tablet 0     Multiple vitamin TABS Take by mouth every other day        oxyCODONE-acetaminophen (PERCOCET) 5-325 MG per tablet Take 1-2 tablets by mouth every 4 hours as needed for severe pain (Patient not taking: Reported on 8/17/2018) 60 tablet 0     simvastatin (ZOCOR) 40 MG tablet Take 1 tablet (40 mg) by mouth At Bedtime 90 tablet 0         ALLERGIES:  Allergies   Allergen Reactions     Fosamax [Alendronic Acid] Palpitations           EXAM:  A limited exam was conducted via video.  General: Patient is pleasant, alert, in no distress. Normal body habitus. Upright.    Eyes: No scleral icterus, no apparent redness or discharge. EOM's appear intact.   Chest/Lungs: No labored breathing, no cough during exam or audible wheezing.   Cardiovascular: No evidence of elevated JVP.   Abdomen: No evidence of abdominal distention. No observed jaundice.  Skin: No rashes or lesions appreciated on visualized skin, normal skin color.  Neuro: No obvious focal defects or tremors.   Psych: Alert and oriented. Does not appear anxious.     The rest of a comprehensive physical examination is deferred due to public health emergency video visit restrictions.       DIAGNOSTICS:  Component      Latest Ref Rng & Units 10/19/2020   Sodium      133 - 144 mmol/L 140   Potassium      3.4 - 5.3 mmol/L 4.0   Chloride      94 - 109 mmol/L 108   Carbon Dioxide      20 - 32 mmol/L 26   Anion Gap      3 - 14 mmol/L 6   Glucose      70 - 99 mg/dL 102 (H)   Urea Nitrogen      7 - 30 mg/dL 14   Creatinine      0.52 - 1.04 mg/dL 0.89   GFR Estimate      >60 mL/min/1.73:m2  67   GFR Estimate If Black      >60 mL/min/1.73:m2 78   Calcium      8.5 - 10.1 mg/dL 8.4 (L)   Cholesterol      <200 mg/dL 183   Triglycerides      <150 mg/dL 94   HDL Cholesterol      >49 mg/dL 67   LDL Cholesterol Calculated      <100 mg/dL 97   Non HDL Cholesterol      <130 mg/dL 116         ASSESSMENT/PLAN:  CAD  - Occasional nighttime chest discomfort that is relieved with breathing or positional change.  No exertional sxs.  Actually she feels best with exercise  - FLP well controlled, though LDL and TG are up from 2 years ago  - BP well controlled  - Work on increasing exercise again.  - Continue ASA, Toprol XL, lisinopril, simvastatin 40 mg    RVOT VT  - Occasional palpitations, no change or increase in the past year  - Continue BB      Meds refilled for the year      Follow up:   1 year with Dr. Baldwin, FLP/BMP      Video-Visit Details  Type of service:  Video Visit  Video Start Time: 0803   Video End Time (time video stopped): 0818  Originating Location (pt. Location): Home  Distant Location (provider location):  [facility]-Home office  Mode of Communication:  Video Conference via Cloud[employer] phone application       [facility]

## 2020-10-22 NOTE — LETTER
10/22/2020    Pelon Foster MD  [facility] [address] [place] [address]    RE: Robin Cazares       Dear Colleague,    I had the pleasure of seeing Robin Cazares in the [facility].    CARDIOLOGY CLINIC VIDEO VISIT  This visit is being conducted as a virtual visit due to the emphasis on mitigation of the COVID-19 virus pandemic. The clinician has decided that the risk of an in-office visit outweighs the benefit for this patient. The rest of the comprehensive physical examination is deferred due to public health emergency video visit restrictions.      Robin Cazares is a 65 year old female who is being evaluated via a billable video visit.      The patient has been notified of following:     This video visit will be conducted via a call between you and your physician/provider. We have found that certain health care needs can be provided without the need for an in-person physical exam.  This service lets us provide the care you need with a video conversation.  If a prescription is necessary we can send it directly to your pharmacy.  If lab work is needed we can place an order for that and you can then stop by our lab to have the test done at a later time.    Virtual visits are billed at different rates depending on your insurance coverage. During this emergency period, for some insurers they may be billed the same as an in-person visit.  Please reach out to your insurance provider with any questions.    If during the course of the call the physician/provider feels a video visit is not appropriate, you will not be charged for this service.      Physician has received verbal consent for a Video Visit from the patient? Yes    Patient would like the video invitation sent by: Text to cell phone: [phone]      MA ROS:  Skin: negative  Eyes: negative  Ears/Nose/Throat: negative  Respiratory: Cough-  Cardiovascular: palpitations and chest  pain  Gastrointestinal: negative  Genitourinary: negative  Musculoskeletal: negative  Neurologic: migraine headaches  Psychiatric: sleep disturbance  Hematologic/Lymphatic/Immunologic: negative  Endocrine: negative        Self reported vitals:  Weight: 135  BP not taken  HR not taken  George BORREGO        Primary Cardiologist: Dr. Baldwin    HPI:  Robin Cazares is a 65 year old female with past medical history including coronary disease.  She had an inferior wall myocardial infarction back in 2008 with revascularization of her right coronary artery.  She has a history of an RVOT VT, and she takes a beta blocker for this.      In the last year, she has not had any major health issues.  She does occasionally get palpitations, but they seem to go away when she exercises or is active.  She Continues to have occasional chest pains at nighttime that are relieved with deep breathing and changing position.  These occur maybe once or twice a month.  She has not had symptoms of heartburn in the past, so she is not sure what that feels like.  She has been taking her medications without difficulty.  She otherwise feels healthy and is very active.   She is not going to the gym right now with COVID.  She tries to do some exercises while watching TV.  In nicer weather, she bikes.  She shoveled the other day and no chest pain or palpitations with this.  Actually she feels better with exercise.     She had her BP checked twice at the rheumatology visit, last in June. They have not indicated that it was a problem.      Weight is stable from last year.     I have reviewed and updated the patient's Past Medical History, Social History, Family History and Medication List.    PROBLEM LIST:  Patient Active Problem List   Diagnosis     CAD (coronary artery disease)     MI (myocardial infarction) (H)     Hyperlipidemia     Palpitations     Internal hemorrhoids     Osteopenia     Osteoporosis     Closed fracture of neck of right femur (H)      Fracture of neck of right femur (H)     Intertrochanteric fracture of right femur (H)       MEDICATIONS:  Current Outpatient Medications   Medication Sig Dispense Refill     aspirin (ASPIRIN) 81 MG EC tablet Take 81 mg by mouth daily       calcium carb 1250 mg, 500 mg [place],/vitamin D 200 unit (OSCAL 500/200 D-3) 500-200 MG-UNIT per tablet Take 1 tablet by mouth every other day        Denosumab (PROLIA SC) Inject Subcutaneous every 6 months As directed        lisinopril (ZESTRIL) 2.5 MG tablet Take 1 tablet (2.5 mg) by mouth daily 90 tablet 0     metoprolol succinate ER (TOPROL-XL) 25 MG 24 hr tablet Take 1 tablet (25 mg) by mouth At Bedtime 90 tablet 0     Multiple vitamin TABS Take by mouth every other day        oxyCODONE-acetaminophen (PERCOCET) 5-325 MG per tablet Take 1-2 tablets by mouth every 4 hours as needed for severe pain (Patient not taking: Reported on 8/17/2018) 60 tablet 0     simvastatin (ZOCOR) 40 MG tablet Take 1 tablet (40 mg) by mouth At Bedtime 90 tablet 0         ALLERGIES:  Allergies   Allergen Reactions     Fosamax [Alendronic Acid] Palpitations           EXAM:  A limited exam was conducted via video.  General: Patient is pleasant, alert, in no distress. Normal body habitus. Upright.    Eyes: No scleral icterus, no apparent redness or discharge. EOM's appear intact.   Chest/Lungs: No labored breathing, no cough during exam or audible wheezing.   Cardiovascular: No evidence of elevated JVP.   Abdomen: No evidence of abdominal distention. No observed jaundice.  Skin: No rashes or lesions appreciated on visualized skin, normal skin color.  Neuro: No obvious focal defects or tremors.   Psych: Alert and oriented. Does not appear anxious.     The rest of a comprehensive physical examination is deferred due to public health emergency video visit restrictions.       DIAGNOSTICS:  Component      Latest Ref Rng & Units 10/19/2020   Sodium      133 - 144 mmol/L 140   Potassium      3.4 - 5.3 mmol/L  4.0   Chloride      94 - 109 mmol/L 108   Carbon Dioxide      20 - 32 mmol/L 26   Anion Gap      3 - 14 mmol/L 6   Glucose      70 - 99 mg/dL 102 (H)   Urea Nitrogen      7 - 30 mg/dL 14   Creatinine      0.52 - 1.04 mg/dL 0.89   GFR Estimate      >60 mL/min/1.73:m2 67   GFR Estimate If Black      >60 mL/min/1.73:m2 78   Calcium      8.5 - 10.1 mg/dL 8.4 (L)   Cholesterol      <200 mg/dL 183   Triglycerides      <150 mg/dL 94   HDL Cholesterol      >49 mg/dL 67   LDL Cholesterol Calculated      <100 mg/dL 97   Non HDL Cholesterol      <130 mg/dL 116         ASSESSMENT/PLAN:  CAD  - Occasional nighttime chest discomfort that is relieved with breathing or positional change.  No exertional sxs.  Actually she feels best with exercise  - FLP well controlled, though LDL and TG are up from 2 years ago  - BP well controlled  - Work on increasing exercise again.  - Continue ASA, Toprol XL, lisinopril, simvastatin 40 mg    RVOT VT  - Occasional palpitations, no change or increase in the past year  - Continue BB      Meds refilled for the year      Follow up:   1 year with Dr. Baldwin, FLP/BMP      Video-Visit Details  Type of service:  Video Visit  Video Start Time: 0803   Video End Time (time video stopped): 0818  Originating Location (pt. Location): Home  Distant Location (provider location):  [facility]-Home office  Mode of Communication:  Video Conference via Language Learning Class phone application           Thank you for allowing me to participate in the care of your patient.    Sincerely,     Lorena Stewart PA-C     [facility]

## 2021-06-29 DIAGNOSIS — I21.19 INFERIOR MI (H): ICD-10-CM

## 2021-06-29 DIAGNOSIS — R00.2 PALPITATIONS: ICD-10-CM

## 2021-06-29 DIAGNOSIS — I25.10 CORONARY ARTERY DISEASE INVOLVING NATIVE CORONARY ARTERY OF NATIVE HEART WITHOUT ANGINA PECTORIS: ICD-10-CM

## 2021-06-29 RX ORDER — SIMVASTATIN 40 MG
40 TABLET ORAL AT BEDTIME
Qty: 90 TABLET | Refills: 0 | Status: SHIPPED | OUTPATIENT
Start: 2021-06-29 | End: 2021-09-30

## 2021-06-29 RX ORDER — METOPROLOL SUCCINATE 25 MG/1
25 TABLET, EXTENDED RELEASE ORAL AT BEDTIME
Qty: 90 TABLET | Refills: 0 | Status: SHIPPED | OUTPATIENT
Start: 2021-06-29 | End: 2021-11-09

## 2021-06-29 RX ORDER — LISINOPRIL 2.5 MG/1
2.5 TABLET ORAL DAILY
Qty: 90 TABLET | Refills: 0 | Status: SHIPPED | OUTPATIENT
Start: 2021-06-29 | End: 2021-12-14

## 2021-09-30 DIAGNOSIS — I21.19 INFERIOR MI (H): ICD-10-CM

## 2021-09-30 DIAGNOSIS — I25.10 CORONARY ARTERY DISEASE INVOLVING NATIVE CORONARY ARTERY OF NATIVE HEART WITHOUT ANGINA PECTORIS: ICD-10-CM

## 2021-09-30 RX ORDER — SIMVASTATIN 40 MG
40 TABLET ORAL AT BEDTIME
Qty: 90 TABLET | Refills: 0 | Status: SHIPPED | OUTPATIENT
Start: 2021-09-30 | End: 2021-12-14

## 2021-11-09 DIAGNOSIS — R00.2 PALPITATIONS: ICD-10-CM

## 2021-11-09 RX ORDER — METOPROLOL SUCCINATE 25 MG/1
25 TABLET, EXTENDED RELEASE ORAL AT BEDTIME
Qty: 90 TABLET | Refills: 0 | Status: SHIPPED | OUTPATIENT
Start: 2021-11-09 | End: 2021-12-14

## 2021-11-09 NOTE — TELEPHONE ENCOUNTER
Received VM from pt requesting refill of metoprolol succinate 25 mg daily be sent to [facility] on [address] in [place]. Last visit on 10/22/20 with DARCIE Carrillo. No follow up currently scheduled. Called back to pt, no answer. Left VM that pt is due for follow up with Dr. Baldwin, will send in 90 day supply but pt needs to schedule an appointment to obtain further refills. Left phone number for scheduling and call back number for Team 1 if any questions. Rx sent to pharmacy.

## 2021-12-07 DIAGNOSIS — I25.10 CORONARY ARTERY DISEASE INVOLVING NATIVE CORONARY ARTERY OF NATIVE HEART WITHOUT ANGINA PECTORIS: ICD-10-CM

## 2021-12-07 DIAGNOSIS — E78.2 MIXED HYPERLIPIDEMIA: Primary | ICD-10-CM

## 2021-12-09 ENCOUNTER — LAB (OUTPATIENT)
Dept: LAB | Facility: CLINIC | Age: 66
End: 2021-12-09
Payer: MEDICARE

## 2021-12-09 DIAGNOSIS — I25.10 CORONARY ARTERY DISEASE INVOLVING NATIVE CORONARY ARTERY OF NATIVE HEART WITHOUT ANGINA PECTORIS: ICD-10-CM

## 2021-12-09 DIAGNOSIS — E78.2 MIXED HYPERLIPIDEMIA: ICD-10-CM

## 2021-12-09 LAB
ANION GAP SERPL CALCULATED.3IONS-SCNC: 5 MMOL/L (ref 3–14)
BUN SERPL-MCNC: 18 MG/DL (ref 7–30)
CALCIUM SERPL-MCNC: 9.3 MG/DL (ref 8.5–10.1)
CHLORIDE BLD-SCNC: 107 MMOL/L (ref 94–109)
CHOLEST SERPL-MCNC: 217 MG/DL
CO2 SERPL-SCNC: 29 MMOL/L (ref 20–32)
CREAT SERPL-MCNC: 0.83 MG/DL (ref 0.52–1.04)
FASTING STATUS PATIENT QL REPORTED: YES
GFR SERPL CREATININE-BSD FRML MDRD: 73 ML/MIN/1.73M2
GLUCOSE BLD-MCNC: 100 MG/DL (ref 70–99)
HDLC SERPL-MCNC: 62 MG/DL
LDLC SERPL CALC-MCNC: 124 MG/DL
NONHDLC SERPL-MCNC: 155 MG/DL
POTASSIUM BLD-SCNC: 4.4 MMOL/L (ref 3.4–5.3)
SODIUM SERPL-SCNC: 141 MMOL/L (ref 133–144)
TRIGL SERPL-MCNC: 154 MG/DL

## 2021-12-09 PROCEDURE — 80048 BASIC METABOLIC PNL TOTAL CA: CPT | Performed by: INTERNAL MEDICINE

## 2021-12-09 PROCEDURE — 80061 LIPID PANEL: CPT | Performed by: INTERNAL MEDICINE

## 2021-12-09 PROCEDURE — 36415 COLL VENOUS BLD VENIPUNCTURE: CPT | Performed by: INTERNAL MEDICINE

## 2021-12-14 ENCOUNTER — OFFICE VISIT (OUTPATIENT)
Dept: CARDIOLOGY | Facility: CLINIC | Age: 66
End: 2021-12-14
Payer: MEDICARE

## 2021-12-14 VITALS
BODY MASS INDEX: 20.76 KG/M2 | HEART RATE: 62 BPM | SYSTOLIC BLOOD PRESSURE: 137 MMHG | DIASTOLIC BLOOD PRESSURE: 73 MMHG | HEIGHT: 67 IN | WEIGHT: 132.3 LBS

## 2021-12-14 DIAGNOSIS — R00.2 PALPITATIONS: ICD-10-CM

## 2021-12-14 DIAGNOSIS — E78.2 MIXED HYPERLIPIDEMIA: Primary | ICD-10-CM

## 2021-12-14 DIAGNOSIS — I21.19 INFERIOR MI (H): ICD-10-CM

## 2021-12-14 DIAGNOSIS — I25.10 CORONARY ARTERY DISEASE INVOLVING NATIVE CORONARY ARTERY OF NATIVE HEART WITHOUT ANGINA PECTORIS: ICD-10-CM

## 2021-12-14 PROCEDURE — 93010 ELECTROCARDIOGRAM REPORT: CPT | Performed by: INTERNAL MEDICINE

## 2021-12-14 PROCEDURE — 99214 OFFICE O/P EST MOD 30 MIN: CPT | Performed by: INTERNAL MEDICINE

## 2021-12-14 RX ORDER — SIMVASTATIN 40 MG
40 TABLET ORAL AT BEDTIME
Qty: 90 TABLET | Refills: 3 | Status: SHIPPED | OUTPATIENT
Start: 2021-12-14 | End: 2022-12-01

## 2021-12-14 RX ORDER — METOPROLOL SUCCINATE 25 MG/1
25 TABLET, EXTENDED RELEASE ORAL AT BEDTIME
Qty: 90 TABLET | Refills: 3 | Status: SHIPPED | OUTPATIENT
Start: 2021-12-14 | End: 2022-12-01

## 2021-12-14 RX ORDER — LISINOPRIL 2.5 MG/1
2.5 TABLET ORAL DAILY
Qty: 90 TABLET | Refills: 3 | Status: SHIPPED | OUTPATIENT
Start: 2021-12-14 | End: 2022-12-01

## 2021-12-14 ASSESSMENT — MIFFLIN-ST. JEOR: SCORE: 1167.74

## 2021-12-14 NOTE — PROGRESS NOTES
Service Date: 12/14/2021    CLINIC FOLLOWUP VISIT      REFERRING PROVIDER:  Pelon Foster MD     HISTORY OF PRESENT ILLNESS:  Ms. Cazares is a pleasant, 66-year-old female with a history of coronary artery disease.  She had a remote inferior wall myocardial infarction in 2008 with revascularization of her right coronary artery.  She also has a history of RVOT VT and is on beta blocker for this.  She has been doing well over the last year. She has not had any health complaints with the exception of some chest discomfort she experiences in the mornings. It always seems to be as she sleeps in and lies around in bed for more than her typical.  She never notices it with exercise, and she has been very active.  She has been hiking up at altitude without any difficulty.  She did have some blood work drawn today, including a basic metabolic panel, which looks normal.  Her cholesterol numbers did go up despite compliance with Zocor at 40. Total cholesterol 217, HDL 62, , and triglycerides were 154.    PHYSICAL EXAMINATION:  Her blood pressure is 137/73. Pulse is 62. Weight is 132. Body mass index is 20.  Carotid upstrokes are brisk without bruit.  Cardiovascular tones are regular without murmur, gallop or rub.  Lungs are clear posteriorly.  She has strong and symmetric pulses in the distal extremities without peripheral edema.    In summary, Ms. Cazares is a very pleasant, 66-year-old female with a history of coronary artery disease and previous inferior wall myocardial infarction.  She is having some atypical chest pain symptoms mainly when she sleeps, and it sounds like it may be musculoskeletal in nature.  I did offer her a stress test for reassurance; she was not interested.  I did refill her medications and recommend that we recheck her cholesterol again in a year because of the increased numbers.  I would be happy to continue to follow her annually or as needed.    Please feel free to contact me with any  questions you have in regard to her care.    cc:  Pelon Foster MD  [facility]   [address] [place] [id number]    Ginny Baldwin DO        D: 2021   T: 2021   MT: rudy    Name:     RAS CASTILLO  MRN:      [id number]        Account:      [id number]   :      1955           Service Date: 2021       Document: [id number]

## 2021-12-14 NOTE — PROGRESS NOTES
HPI and Plan:   See dictation    Orders Placed This Encounter   Procedures     EKG 12-lead complete w/read - Clinics (performed today)       Orders Placed This Encounter   Medications     lisinopril (ZESTRIL) 2.5 MG tablet     Sig: Take 1 tablet (2.5 mg) by mouth daily     Dispense:  90 tablet     Refill:  3     simvastatin (ZOCOR) 40 MG tablet     Sig: Take 1 tablet (40 mg) by mouth At Bedtime     Dispense:  90 tablet     Refill:  3     metoprolol succinate ER (TOPROL-XL) 25 MG 24 hr tablet     Sig: Take 1 tablet (25 mg) by mouth At Bedtime     Dispense:  90 tablet     Refill:  3       Medications Discontinued During This Encounter   Medication Reason     lisinopril (ZESTRIL) 2.5 MG tablet Reorder     simvastatin (ZOCOR) 40 MG tablet Reorder     metoprolol succinate ER (TOPROL-XL) 25 MG 24 hr tablet Reorder         Encounter Diagnoses   Name Primary?     Coronary artery disease involving native coronary artery of native heart without angina pectoris      Palpitations      Inferior MI (H)        CURRENT MEDICATIONS:  Current Outpatient Medications   Medication Sig Dispense Refill     aspirin (ASPIRIN) 81 MG EC tablet Take 81 mg by mouth daily       Denosumab (PROLIA SC) Inject Subcutaneous every 6 months As directed        lisinopril (ZESTRIL) 2.5 MG tablet Take 1 tablet (2.5 mg) by mouth daily 90 tablet 3     metoprolol succinate ER (TOPROL-XL) 25 MG 24 hr tablet Take 1 tablet (25 mg) by mouth At Bedtime 90 tablet 3     Multiple vitamin TABS Take by mouth every other day        simvastatin (ZOCOR) 40 MG tablet Take 1 tablet (40 mg) by mouth At Bedtime 90 tablet 3     calcium carb 1250 mg, 500 mg [place],/vitamin D 200 unit (OSCAL 500/200 D-3) 500-200 MG-UNIT per tablet Take 1 tablet by mouth every other day  (Patient not taking: Reported on 12/14/2021)       oxyCODONE-acetaminophen (PERCOCET) 5-325 MG per tablet Take 1-2 tablets by mouth every 4 hours as needed for severe pain (Patient not taking: Reported on  8/17/2018) 60 tablet 0       ALLERGIES     Allergies   Allergen Reactions     Fosamax [Alendronic Acid] Palpitations       PAST MEDICAL HISTORY:  Past Medical History:   Diagnosis Date     CAD (coronary artery disease)     8/2008 angioplasty occluded distal OM branch     Hyperlipidemia      Internal hemorrhoids      MI (myocardial infarction) (H)     2008     Osteopenia      Osteoporosis      Palpitations      S/P breast lumpectomy      Vaginal bleeding        PAST SURGICAL HISTORY:  Past Surgical History:   Procedure Laterality Date     HEART CATH, ANGIOPLASTY      8/2008 angioplasty occluded distal OM branch     OPEN REDUCTION INTERNAL FIXATION FEMUR PROXIMAL Right 6/2/2018    Procedure: OPEN REDUCTION INTERNAL FIXATION FEMUR PROXIMAL;  OPEN REDUCTION INTERNAL FIXATION RIGHT FEMUR FRACTURE;  Surgeon: Perico Zapata MD;  Location:  OR       FAMILY HISTORY:  Family History   Problem Relation Age of Onset     Heart Disease Father      Leukemia Mother      Heart Disease Other        SOCIAL HISTORY:  Social History     Socioeconomic History     Marital status:      Spouse name: None     Number of children: None     Years of education: None     Highest education level: None   Occupational History     None   Tobacco Use     Smoking status: Never Smoker     Smokeless tobacco: Never Used   Substance and Sexual Activity     Alcohol use: Yes     Alcohol/week: 0.0 standard drinks     Comment: rare      Drug use: No     Sexual activity: None   Other Topics Concern     Parent/sibling w/ CABG, MI or angioplasty before 65F 55M? Not Asked      Service Not Asked     Blood Transfusions Not Asked     Caffeine Concern No     Comment: rare     Occupational Exposure Not Asked     Hobby Hazards Not Asked     Sleep Concern Yes     Stress Concern No     Weight Concern No     Special Diet No     Back Care Not Asked     Exercise No     Comment: walks 5-7 days week     Bike Helmet Not Asked     Seat Belt Not Asked  "    Self-Exams Not Asked   Social History Narrative     None     Social Determinants of Health     Financial Resource Strain: Not on file   Food Insecurity: Not on file   Transportation Needs: Not on file   Physical Activity: Not on file   Stress: Not on file   Social Connections: Not on file   Intimate Partner Violence: Not on file   Housing Stability: Not on file       Review of Systems:  Skin:  Negative       Eyes:  Positive for glasses    ENT:  Negative      Respiratory:  Positive for cough     Cardiovascular:  Negative;palpitations;syncope or near-syncope;lightheadedness;dizziness;cyanosis;edema;exercise intolerance Positive for chest pain, occ, in the morning, feels tired a lot, not sleeping well  Gastroenterology: Negative      Genitourinary:  Negative      Musculoskeletal:  Negative      Neurologic:  Positive for migraine headaches    Psychiatric:  Positive for sleep disturbances    Heme/Lymph/Imm:  Positive for easy bruising    Endocrine:  Negative        Physical Exam:  Vitals: /73 (BP Location: Right arm, Cuff Size: Adult Large)   Pulse 62   Ht 1.702 m (5' 7\")   Wt 60 kg (132 lb 4.8 oz)   BMI 20.72 kg/m      Constitutional:  cooperative, alert and oriented, well developed, well nourished, in no acute distress        Skin:  warm and dry to the touch          Head:  normocephalic, no masses or lesions        Eyes:           Lymph:      ENT:  no pallor or cyanosis, dentition good        Neck:  carotid pulses are full and equal bilaterally        Respiratory:  clear to auscultation         Cardiac: regular rhythm;no murmurs, gallops or rubs detected                pulses full and equal, no bruits auscultated                                        GI:  abdomen soft;no bruits        Extremities and Muscular Skeletal:  no deformities, clubbing, cyanosis, erythema observed              Neurological:  no gross motor deficits        Psych:  Alert and Oriented x 3          CC  Lorena Stewart, PAChristianoC  [id number] [place] [address]

## 2021-12-14 NOTE — LETTER
12/14/2021    Pelon Foster MD  [facility] [address] [place] [address]    RE: Robin Cazares       Dear Colleague,     I had the pleasure of seeing Robin Cazares in the [facility].  HPI and Plan:   See dictation    Orders Placed This Encounter   Procedures     EKG 12-lead complete w/read - Clinics (performed today)       Orders Placed This Encounter   Medications     lisinopril (ZESTRIL) 2.5 MG tablet     Sig: Take 1 tablet (2.5 mg) by mouth daily     Dispense:  90 tablet     Refill:  3     simvastatin (ZOCOR) 40 MG tablet     Sig: Take 1 tablet (40 mg) by mouth At Bedtime     Dispense:  90 tablet     Refill:  3     metoprolol succinate ER (TOPROL-XL) 25 MG 24 hr tablet     Sig: Take 1 tablet (25 mg) by mouth At Bedtime     Dispense:  90 tablet     Refill:  3       Medications Discontinued During This Encounter   Medication Reason     lisinopril (ZESTRIL) 2.5 MG tablet Reorder     simvastatin (ZOCOR) 40 MG tablet Reorder     metoprolol succinate ER (TOPROL-XL) 25 MG 24 hr tablet Reorder         Encounter Diagnoses   Name Primary?     Coronary artery disease involving native coronary artery of native heart without angina pectoris      Palpitations      Inferior MI (H)        CURRENT MEDICATIONS:  Current Outpatient Medications   Medication Sig Dispense Refill     aspirin (ASPIRIN) 81 MG EC tablet Take 81 mg by mouth daily       Denosumab (PROLIA SC) Inject Subcutaneous every 6 months As directed        lisinopril (ZESTRIL) 2.5 MG tablet Take 1 tablet (2.5 mg) by mouth daily 90 tablet 3     metoprolol succinate ER (TOPROL-XL) 25 MG 24 hr tablet Take 1 tablet (25 mg) by mouth At Bedtime 90 tablet 3     Multiple vitamin TABS Take by mouth every other day        simvastatin (ZOCOR) 40 MG tablet Take 1 tablet (40 mg) by mouth At Bedtime 90 tablet 3     calcium carb 1250 mg, 500 mg [place],/vitamin D 200 unit (OSCAL 500/200 D-3) 500-200 MG-UNIT per tablet  Take 1 tablet by mouth every other day  (Patient not taking: Reported on 12/14/2021)       oxyCODONE-acetaminophen (PERCOCET) 5-325 MG per tablet Take 1-2 tablets by mouth every 4 hours as needed for severe pain (Patient not taking: Reported on 8/17/2018) 60 tablet 0       ALLERGIES     Allergies   Allergen Reactions     Fosamax [Alendronic Acid] Palpitations       PAST MEDICAL HISTORY:  Past Medical History:   Diagnosis Date     CAD (coronary artery disease)     8/2008 angioplasty occluded distal OM branch     Hyperlipidemia      Internal hemorrhoids      MI (myocardial infarction) (H)     2008     Osteopenia      Osteoporosis      Palpitations      S/P breast lumpectomy      Vaginal bleeding        PAST SURGICAL HISTORY:  Past Surgical History:   Procedure Laterality Date     HEART CATH, ANGIOPLASTY      8/2008 angioplasty occluded distal OM branch     OPEN REDUCTION INTERNAL FIXATION FEMUR PROXIMAL Right 6/2/2018    Procedure: OPEN REDUCTION INTERNAL FIXATION FEMUR PROXIMAL;  OPEN REDUCTION INTERNAL FIXATION RIGHT FEMUR FRACTURE;  Surgeon: Perico Zapata MD;  Location:  OR       FAMILY HISTORY:  Family History   Problem Relation Age of Onset     Heart Disease Father      Leukemia Mother      Heart Disease Other        SOCIAL HISTORY:  Social History     Socioeconomic History     Marital status:      Spouse name: None     Number of children: None     Years of education: None     Highest education level: None   Occupational History     None   Tobacco Use     Smoking status: Never Smoker     Smokeless tobacco: Never Used   Substance and Sexual Activity     Alcohol use: Yes     Alcohol/week: 0.0 standard drinks     Comment: rare      Drug use: No     Sexual activity: None   Other Topics Concern     Parent/sibling w/ CABG, MI or angioplasty before 65F 55M? Not Asked      Service Not Asked     Blood Transfusions Not Asked     Caffeine Concern No     Comment: rare     Occupational Exposure  "Not Asked     Hobby Hazards Not Asked     Sleep Concern Yes     Stress Concern No     Weight Concern No     Special Diet No     Back Care Not Asked     Exercise No     Comment: walks 5-7 days week     Bike Helmet Not Asked     Seat Belt Not Asked     Self-Exams Not Asked   Social History Narrative     None     Social Determinants of Health     Financial Resource Strain: Not on file   Food Insecurity: Not on file   Transportation Needs: Not on file   Physical Activity: Not on file   Stress: Not on file   Social Connections: Not on file   Intimate Partner Violence: Not on file   Housing Stability: Not on file       Review of Systems:  Skin:  Negative       Eyes:  Positive for glasses    ENT:  Negative      Respiratory:  Positive for cough     Cardiovascular:  Negative;palpitations;syncope or near-syncope;lightheadedness;dizziness;cyanosis;edema;exercise intolerance Positive for chest pain, occ, in the morning, feels tired a lot, not sleeping well  Gastroenterology: Negative      Genitourinary:  Negative      Musculoskeletal:  Negative      Neurologic:  Positive for migraine headaches    Psychiatric:  Positive for sleep disturbances    Heme/Lymph/Imm:  Positive for easy bruising    Endocrine:  Negative        Physical Exam:  Vitals: /73 (BP Location: Right arm, Cuff Size: Adult Large)   Pulse 62   Ht 1.702 m (5' 7\")   Wt 60 kg (132 lb 4.8 oz)   BMI 20.72 kg/m      Constitutional:  cooperative, alert and oriented, well developed, well nourished, in no acute distress        Skin:  warm and dry to the touch          Head:  normocephalic, no masses or lesions        Eyes:           Lymph:      ENT:  no pallor or cyanosis, dentition good        Neck:  carotid pulses are full and equal bilaterally        Respiratory:  clear to auscultation         Cardiac: regular rhythm;no murmurs, gallops or rubs detected                pulses full and equal, no bruits auscultated                                        GI:  " abdomen soft;no bruits        Extremities and Muscular Skeletal:  no deformities, clubbing, cyanosis, erythema observed              Neurological:  no gross motor deficits        Psych:  Alert and Oriented x 3          CC  Lorena Stewart PA-C  [address] [place] [address]    Thank you for allowing me to participate in the care of your patient.      Sincerely,     Ginny Baldwin DO     [facility]  cc:   Lorena Stewart PA-C  [address] [place] [address]         Normal for race

## 2022-11-29 ENCOUNTER — LAB (OUTPATIENT)
Dept: LAB | Facility: CLINIC | Age: 67
End: 2022-11-29
Payer: MEDICARE

## 2022-11-29 DIAGNOSIS — I21.19 INFERIOR MI (H): ICD-10-CM

## 2022-11-29 DIAGNOSIS — I25.10 CORONARY ARTERY DISEASE INVOLVING NATIVE CORONARY ARTERY OF NATIVE HEART WITHOUT ANGINA PECTORIS: ICD-10-CM

## 2022-11-29 DIAGNOSIS — R00.2 PALPITATIONS: ICD-10-CM

## 2022-11-29 DIAGNOSIS — E78.2 MIXED HYPERLIPIDEMIA: ICD-10-CM

## 2022-11-29 LAB
ALT SERPL W P-5'-P-CCNC: 30 U/L (ref 0–50)
CHOLEST SERPL-MCNC: 173 MG/DL
FASTING STATUS PATIENT QL REPORTED: YES
HDLC SERPL-MCNC: 65 MG/DL
LDLC SERPL CALC-MCNC: 94 MG/DL
NONHDLC SERPL-MCNC: 108 MG/DL
TRIGL SERPL-MCNC: 69 MG/DL

## 2022-11-29 PROCEDURE — 80061 LIPID PANEL: CPT | Performed by: INTERNAL MEDICINE

## 2022-11-29 PROCEDURE — 84460 ALANINE AMINO (ALT) (SGPT): CPT | Performed by: INTERNAL MEDICINE

## 2022-11-29 PROCEDURE — 36415 COLL VENOUS BLD VENIPUNCTURE: CPT | Performed by: INTERNAL MEDICINE

## 2022-11-29 NOTE — PROGRESS NOTES
"[facility]    I had the pleasure of meeting Robin when she came for annual follow-up.  This 67 year old sees Dr. Baldwin and has seen Dr. Maria for her history of:    1.  CAD - s/p IWMI 2008 with revascularization of dOM  2.  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  On BB therapy  3.  Dyslipidemia      Dr. Baldwin saw her 12/2021 at which time she was doing well with the exception of some chest discomfort when she would first wake up in the morning.  She remained very active without discomfort, and declined the stress test Dr. Baldwin offered for peace of mind.  No changes follow-up was recommended.    In review of the chart, she saw Dr. 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  Metoprolol was recommended and follow-up ZioPatch showed no evidence of recurrent ventricular tachycardia.    It appears I am seeing her today for routine follow-up with labs    Interval History:  Robin overall has done well since she saw Dr. Baldwin last year.  She hiked at elevation while in [place] and did very well with this without limitations with the exception of some mild RANGEL, which she was expecting given the nature of the hikes.  She did not have any significant palpitations and certainly had no presyncope/syncope while exercising.  She continues on metoprolol XL.    Just this morning, however, Robin woke up and felt some chest pressure and nausea. This was different than the discomfort she described to Dr. Baldwin last year.  Lasted ~30 minutes. Improved after she had a bowel movement and has not recurred.  The discomfort was not changed by palpation (though she did a more vigorous workout yesterday involving her pectoralis muscles) or deep breathing.  She now feels completely fine.    Denies edema, orthopnea, PND.  Overall, until this morning, she felt \"totally normal.\"    VITALS:  Vitals: /80   Pulse " "70   Ht 1.715 m (5' 7.5\")   Wt 58.1 kg (128 lb)   BMI 19.75 kg/m      Diagnostic Testing:  EKG today, which I overread, showed SR 64 bpm with poor RWP and none diagnostic ST-T wave changes  ZioPatch 11/2017- no ventricular tachycardia  Stress echocardiogram 9/2017-LV EF 60-65%.  Normal LV SF augmentation with exercise.  Abnormal resting wall motion c/w old infarction.  WCT noted  Angiogram 2008-normal LM, trivial irregularities of LAD.  Circumflex was very tortuous.  Distal OM1 closed distally with only faint filling of distal vessel.  RCA with mild luminal irregularities.  Stenting of this small distal tortuous vessel resulted in closure of sidebranch and visible dissection in the area of dilatation  Component      Latest Ref Rng & Units 10/19/2020 12/9/2021 11/29/2022   Cholesterol      <200 mg/dL 183 217 (H) 173   Triglycerides      <150 mg/dL 94 154 (H) 69   HDL Cholesterol      >=50 mg/dL 67 62 65   LDL Cholesterol Calculated      <=100 mg/dL 97 124 (H) 94   Non HDL Cholesterol      <130 mg/dL 116 155 (H) 108   Patient Fasting > 8hrs?        Yes Yes   ALT      0 - 50 U/L   30         Plan:  Echocardiogram and fasting labs in 1 year    Assessment/Plan:    1. H/o idiopathic RVOT VT    Remains on metoprolol XL 25 mg daily which he feels is working well.    She was never terribly symptomatic with her VT in the past, but notes no palpitations with exertion.  No syncope or dizziness.    PLAN:    Continue metoprolol XL 25 mg daily    Echo 1 year    2. CAD    Inferior MI 2008 treated with dOM stent implantation    Minimal disease elsewhere    Last stress evaluation 2017 with no exercise-induced wall motion abnormalities    VT seen on stress test 2017 not felt to be related to previous MI/scar    As above, woke up this morning with an odd sensation that improved with a bowel movement.  She described nausea and chest pressure.  No exertional component to this, and feels totally fine now    EKG without acute ischemic " changes    PLAN:    Discussed obtaining stress test to evaluate her discomfort, but as she only had this this morning, she would like to see if it becomes a pattern or starts impacting her workouts at all.  Given the atypical nature of it, I think it is reasonable to continue to monitor this.  She will therefore call if she wants to proceed with nuclear stress testing     Otherwise, will continue routine follow-up with Dr. Balwdin & her team    Echo in 1 year    3. Dyslipidemia    Lipids, as above really look quite a bit better than they did last year    She remains on simvastatin 40 and is tolerating this without issues    PLAN:    Continue current medications          Liudmila Osullivan PA-C, MSPAS      Orders Placed This Encounter   Procedures     Lipid Profile     ALT     Basic metabolic panel     Follow-Up with Cardiology     EKG 12-lead complete w/read - Clinics (performed today)     Echocardiogram Complete     Orders Placed This Encounter   Medications     lisinopril (ZESTRIL) 2.5 MG tablet     Sig: Take 1 tablet (2.5 mg) by mouth daily     Dispense:  90 tablet     Refill:  3     simvastatin (ZOCOR) 40 MG tablet     Sig: Take 1 tablet (40 mg) by mouth At Bedtime     Dispense:  90 tablet     Refill:  3     metoprolol succinate ER (TOPROL XL) 25 MG 24 hr tablet     Sig: Take 1 tablet (25 mg) by mouth At Bedtime     Dispense:  90 tablet     Refill:  3     Medications Discontinued During This Encounter   Medication Reason     lisinopril (ZESTRIL) 2.5 MG tablet Reorder     simvastatin (ZOCOR) 40 MG tablet Reorder     metoprolol succinate ER (TOPROL-XL) 25 MG 24 hr tablet Reorder         Encounter Diagnoses   Name Primary?     Coronary artery disease involving native coronary artery of native heart without angina pectoris      Palpitations      Inferior MI (H)      Mixed hyperlipidemia        CURRENT MEDICATIONS:  Current Outpatient Medications   Medication Sig Dispense Refill     aspirin (ASA) 81 MG EC tablet Take 81 mg  "by mouth daily       calcium carbonate-vitamin D (OSCAL W/D) 500-200 MG-UNIT tablet Take 1 tablet by mouth every other day       Denosumab (PROLIA SC) Inject Subcutaneous every 6 months As directed        lisinopril (ZESTRIL) 2.5 MG tablet Take 1 tablet (2.5 mg) by mouth daily 90 tablet 3     metoprolol succinate ER (TOPROL XL) 25 MG 24 hr tablet Take 1 tablet (25 mg) by mouth At Bedtime 90 tablet 3     Multiple vitamin TABS Take by mouth daily       simvastatin (ZOCOR) 40 MG tablet Take 1 tablet (40 mg) by mouth At Bedtime 90 tablet 3     oxyCODONE-acetaminophen (PERCOCET) 5-325 MG per tablet Take 1-2 tablets by mouth every 4 hours as needed for severe pain (Patient not taking: Reported on 12/1/2022) 60 tablet 0       ALLERGIES     Allergies   Allergen Reactions     Fosamax [Alendronic Acid] Palpitations         Review of Systems:  Skin:  Negative     Eyes:  Positive for glasses  ENT:  Negative    Respiratory:  Positive for cough  Cardiovascular:  Negative;palpitations;syncope or near-syncope;lightheadedness;dizziness;cyanosis;edema;exercise intolerance Positive for  Gastroenterology: Negative    Genitourinary:  Negative    Musculoskeletal:  Negative    Neurologic:  Positive for migraine headaches  Psychiatric:  Positive for sleep disturbances  Heme/Lymph/Imm:  Positive for easy bruising  Endocrine:  Negative      Physical Exam:  Vitals: /80   Pulse 70   Ht 1.715 m (5' 7.5\")   Wt 58.1 kg (128 lb)   BMI 19.75 kg/m      Constitutional:  cooperative, alert and oriented, well developed, well nourished, in no acute distress        Skin:  warm and dry to the touch        Head:  normocephalic, no masses or lesions        Eyes:           ENT:  no pallor or cyanosis, dentition good        Neck:  carotid pulses are full and equal bilaterally        Chest:  clear to auscultation        Cardiac: regular rhythm;no murmurs, gallops or rubs detected                  Abdomen:  abdomen soft;no bruits        Vascular: " pulses full and equal, no bruits auscultated                                      Extremities and Back:  no deformities, clubbing, cyanosis, erythema observed        Neurological:  no gross motor deficits            PAST MEDICAL HISTORY:  Past Medical History:   Diagnosis Date     CAD (coronary artery disease)     8/2008 angioplasty occluded distal OM branch     Hyperlipidemia      Internal hemorrhoids      MI (myocardial infarction) (H)     2008     Osteopenia      Osteoporosis      Palpitations      S/P breast lumpectomy      Vaginal bleeding        PAST SURGICAL HISTORY:  Past Surgical History:   Procedure Laterality Date     HEART CATH, ANGIOPLASTY      8/2008 angioplasty occluded distal OM branch     OPEN REDUCTION INTERNAL FIXATION FEMUR PROXIMAL Right 6/2/2018    Procedure: OPEN REDUCTION INTERNAL FIXATION FEMUR PROXIMAL;  OPEN REDUCTION INTERNAL FIXATION RIGHT FEMUR FRACTURE;  Surgeon: Perico Zapata MD;  Location:  OR       FAMILY HISTORY:  Family History   Problem Relation Age of Onset     Heart Disease Father      Leukemia Mother      Heart Disease Other        SOCIAL HISTORY:  Social History     Socioeconomic History     Marital status:      Spouse name: None     Number of children: None     Years of education: None     Highest education level: None   Tobacco Use     Smoking status: Never     Smokeless tobacco: Never   Substance and Sexual Activity     Alcohol use: Yes     Alcohol/week: 0.0 standard drinks     Comment: rare      Drug use: No   Other Topics Concern     Caffeine Concern No     Comment: rare     Sleep Concern Yes     Stress Concern No     Weight Concern No     Special Diet No     Exercise No     Comment: walks 5-7 days week

## 2022-12-01 ENCOUNTER — OFFICE VISIT (OUTPATIENT)
Dept: CARDIOLOGY | Facility: CLINIC | Age: 67
End: 2022-12-01
Attending: INTERNAL MEDICINE
Payer: MEDICARE

## 2022-12-01 VITALS
SYSTOLIC BLOOD PRESSURE: 136 MMHG | BODY MASS INDEX: 19.4 KG/M2 | DIASTOLIC BLOOD PRESSURE: 80 MMHG | HEART RATE: 70 BPM | HEIGHT: 68 IN | WEIGHT: 128 LBS

## 2022-12-01 DIAGNOSIS — I25.10 CORONARY ARTERY DISEASE INVOLVING NATIVE CORONARY ARTERY OF NATIVE HEART WITHOUT ANGINA PECTORIS: ICD-10-CM

## 2022-12-01 DIAGNOSIS — R00.2 PALPITATIONS: ICD-10-CM

## 2022-12-01 DIAGNOSIS — I21.19 INFERIOR MI (H): ICD-10-CM

## 2022-12-01 DIAGNOSIS — E78.2 MIXED HYPERLIPIDEMIA: ICD-10-CM

## 2022-12-01 PROCEDURE — 99214 OFFICE O/P EST MOD 30 MIN: CPT | Performed by: PHYSICIAN ASSISTANT

## 2022-12-01 PROCEDURE — 93000 ELECTROCARDIOGRAM COMPLETE: CPT | Performed by: PHYSICIAN ASSISTANT

## 2022-12-01 RX ORDER — SIMVASTATIN 40 MG
40 TABLET ORAL AT BEDTIME
Qty: 90 TABLET | Refills: 3 | Status: ON HOLD | OUTPATIENT
Start: 2022-12-01 | End: 2023-10-18

## 2022-12-01 RX ORDER — LISINOPRIL 2.5 MG/1
2.5 TABLET ORAL DAILY
Qty: 90 TABLET | Refills: 3 | Status: ON HOLD | OUTPATIENT
Start: 2022-12-01 | End: 2023-10-18

## 2022-12-01 RX ORDER — METOPROLOL SUCCINATE 25 MG/1
25 TABLET, EXTENDED RELEASE ORAL AT BEDTIME
Qty: 90 TABLET | Refills: 3 | Status: SHIPPED | OUTPATIENT
Start: 2022-12-01 | End: 2024-02-02

## 2022-12-01 NOTE — LETTER
12/1/2022    [facility]  [address]  [place] [address]    RE: Edouardmaria g KARLA Cazares       Dear Colleague,     I had the pleasure of seeing Robin Cazares in the [facility].  [facility]    I had the pleasure of meeting Robin when she came for annual follow-up.  This 67 year old sees Dr. Baldwin and has seen Dr. Maria for her history of:    1.  CAD - s/p IWMI 2008 with revascularization of dOM  2.  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  On BB therapy  3.  Dyslipidemia      Dr. Baldwin saw her 12/2021 at which time she was doing well with the exception of some chest discomfort when she would first wake up in the morning.  She remained very active without discomfort, and declined the stress test Dr. Baldwin offered for peace of mind.  No changes follow-up was recommended.    In review of the chart, she saw Dr. 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  Metoprolol was recommended and follow-up ZioPatch showed no evidence of recurrent ventricular tachycardia.    It appears I am seeing her today for routine follow-up with labs    Interval History:  Robin overall has done well since she saw Dr. Baldwin last year.  She hiked at elevation while in [place] and did very well with this without limitations with the exception of some mild RANGEL, which she was expecting given the nature of the hikes.  She did not have any significant palpitations and certainly had no presyncope/syncope while exercising.  She continues on metoprolol XL.    Just this morning, however, Robin woke up and felt some chest pressure and nausea. This was different than the discomfort she described to Dr. Baldwin last year.  Lasted ~30 minutes. Improved after she had a bowel movement and has not recurred.  The discomfort was not changed by palpation (though she did a more  "vigorous workout yesterday involving her pectoralis muscles) or deep breathing.  She now feels completely fine.    Denies edema, orthopnea, PND.  Overall, until this morning, she felt \"totally normal.\"    VITALS:  Vitals: /80   Pulse 70   Ht 1.715 m (5' 7.5\")   Wt 58.1 kg (128 lb)   BMI 19.75 kg/m      Diagnostic Testing:  EKG today, which I overread, showed SR 64 bpm with poor RWP and none diagnostic ST-T wave changes  ZioPatch 11/2017- no ventricular tachycardia  Stress echocardiogram 9/2017-LV EF 60-65%.  Normal LV SF augmentation with exercise.  Abnormal resting wall motion c/w old infarction.  WCT noted  Angiogram 2008-normal LM, trivial irregularities of LAD.  Circumflex was very tortuous.  Distal OM1 closed distally with only faint filling of distal vessel.  RCA with mild luminal irregularities.  Stenting of this small distal tortuous vessel resulted in closure of sidebranch and visible dissection in the area of dilatation  Component      Latest Ref Rng & Units 10/19/2020 12/9/2021 11/29/2022   Cholesterol      <200 mg/dL 183 217 (H) 173   Triglycerides      <150 mg/dL 94 154 (H) 69   HDL Cholesterol      >=50 mg/dL 67 62 65   LDL Cholesterol Calculated      <=100 mg/dL 97 124 (H) 94   Non HDL Cholesterol      <130 mg/dL 116 155 (H) 108   Patient Fasting > 8hrs?        Yes Yes   ALT      0 - 50 U/L   30         Plan:  Echocardiogram and fasting labs in 1 year    Assessment/Plan:    1. H/o idiopathic RVOT VT    Remains on metoprolol XL 25 mg daily which he feels is working well.    She was never terribly symptomatic with her VT in the past, but notes no palpitations with exertion.  No syncope or dizziness.    PLAN:    Continue metoprolol XL 25 mg daily    Echo 1 year    2. CAD    Inferior MI 2008 treated with dOM stent implantation    Minimal disease elsewhere    Last stress evaluation 2017 with no exercise-induced wall motion abnormalities    VT seen on stress test 2017 not felt to be related to " previous MI/scar    As above, woke up this morning with an odd sensation that improved with a bowel movement.  She described nausea and chest pressure.  No exertional component to this, and feels totally fine now    EKG without acute ischemic changes    PLAN:    Discussed obtaining stress test to evaluate her discomfort, but as she only had this this morning, she would like to see if it becomes a pattern or starts impacting her workouts at all.  Given the atypical nature of it, I think it is reasonable to continue to monitor this.  She will therefore call if she wants to proceed with nuclear stress testing     Otherwise, will continue routine follow-up with Dr. Baldwin & her team    Echo in 1 year    3. Dyslipidemia    Lipids, as above really look quite a bit better than they did last year    She remains on simvastatin 40 and is tolerating this without issues    PLAN:    Continue current medications          Liudmila Osullivan PA-C, MSPAS      Orders Placed This Encounter   Procedures     Lipid Profile     ALT     Basic metabolic panel     Follow-Up with Cardiology     EKG 12-lead complete w/read - Clinics (performed today)     Echocardiogram Complete     Orders Placed This Encounter   Medications     lisinopril (ZESTRIL) 2.5 MG tablet     Sig: Take 1 tablet (2.5 mg) by mouth daily     Dispense:  90 tablet     Refill:  3     simvastatin (ZOCOR) 40 MG tablet     Sig: Take 1 tablet (40 mg) by mouth At Bedtime     Dispense:  90 tablet     Refill:  3     metoprolol succinate ER (TOPROL XL) 25 MG 24 hr tablet     Sig: Take 1 tablet (25 mg) by mouth At Bedtime     Dispense:  90 tablet     Refill:  3     Medications Discontinued During This Encounter   Medication Reason     lisinopril (ZESTRIL) 2.5 MG tablet Reorder     simvastatin (ZOCOR) 40 MG tablet Reorder     metoprolol succinate ER (TOPROL-XL) 25 MG 24 hr tablet Reorder         Encounter Diagnoses   Name Primary?     Coronary artery disease involving native coronary artery  "of native heart without angina pectoris      Palpitations      Inferior MI (H)      Mixed hyperlipidemia        CURRENT MEDICATIONS:  Current Outpatient Medications   Medication Sig Dispense Refill     aspirin (ASA) 81 MG EC tablet Take 81 mg by mouth daily       calcium carbonate-vitamin D (OSCAL W/D) 500-200 MG-UNIT tablet Take 1 tablet by mouth every other day       Denosumab (PROLIA SC) Inject Subcutaneous every 6 months As directed        lisinopril (ZESTRIL) 2.5 MG tablet Take 1 tablet (2.5 mg) by mouth daily 90 tablet 3     metoprolol succinate ER (TOPROL XL) 25 MG 24 hr tablet Take 1 tablet (25 mg) by mouth At Bedtime 90 tablet 3     Multiple vitamin TABS Take by mouth daily       simvastatin (ZOCOR) 40 MG tablet Take 1 tablet (40 mg) by mouth At Bedtime 90 tablet 3     oxyCODONE-acetaminophen (PERCOCET) 5-325 MG per tablet Take 1-2 tablets by mouth every 4 hours as needed for severe pain (Patient not taking: Reported on 12/1/2022) 60 tablet 0       ALLERGIES     Allergies   Allergen Reactions     Fosamax [Alendronic Acid] Palpitations         Review of Systems:  Skin:  Negative     Eyes:  Positive for glasses  ENT:  Negative    Respiratory:  Positive for cough  Cardiovascular:  Negative;palpitations;syncope or near-syncope;lightheadedness;dizziness;cyanosis;edema;exercise intolerance Positive for  Gastroenterology: Negative    Genitourinary:  Negative    Musculoskeletal:  Negative    Neurologic:  Positive for migraine headaches  Psychiatric:  Positive for sleep disturbances  Heme/Lymph/Imm:  Positive for easy bruising  Endocrine:  Negative      Physical Exam:  Vitals: /80   Pulse 70   Ht 1.715 m (5' 7.5\")   Wt 58.1 kg (128 lb)   BMI 19.75 kg/m      Constitutional:  cooperative, alert and oriented, well developed, well nourished, in no acute distress        Skin:  warm and dry to the touch        Head:  normocephalic, no masses or lesions        Eyes:           ENT:  no pallor or cyanosis, " dentition good        Neck:  carotid pulses are full and equal bilaterally        Chest:  clear to auscultation        Cardiac: regular rhythm;no murmurs, gallops or rubs detected                  Abdomen:  abdomen soft;no bruits        Vascular: pulses full and equal, no bruits auscultated                                      Extremities and Back:  no deformities, clubbing, cyanosis, erythema observed        Neurological:  no gross motor deficits           PAST MEDICAL HISTORY:  Past Medical History:   Diagnosis Date     CAD (coronary artery disease)     8/2008 angioplasty occluded distal OM branch     Hyperlipidemia      Internal hemorrhoids      MI (myocardial infarction) (H)     2008     Osteopenia      Osteoporosis      Palpitations      S/P breast lumpectomy      Vaginal bleeding        PAST SURGICAL HISTORY:  Past Surgical History:   Procedure Laterality Date     HEART CATH, ANGIOPLASTY      8/2008 angioplasty occluded distal OM branch     OPEN REDUCTION INTERNAL FIXATION FEMUR PROXIMAL Right 6/2/2018    Procedure: OPEN REDUCTION INTERNAL FIXATION FEMUR PROXIMAL;  OPEN REDUCTION INTERNAL FIXATION RIGHT FEMUR FRACTURE;  Surgeon: Perico Zapata MD;  Location:  OR       FAMILY HISTORY:  Family History   Problem Relation Age of Onset     Heart Disease Father      Leukemia Mother      Heart Disease Other        SOCIAL HISTORY:  Social History     Socioeconomic History     Marital status:      Spouse name: None     Number of children: None     Years of education: None     Highest education level: None   Tobacco Use     Smoking status: Never     Smokeless tobacco: Never   Substance and Sexual Activity     Alcohol use: Yes     Alcohol/week: 0.0 standard drinks     Comment: rare      Drug use: No   Other Topics Concern     Caffeine Concern No     Comment: rare     Sleep Concern Yes     Stress Concern No     Weight Concern No     Special Diet No     Exercise No     Comment: walks 5-7 days week                 Thank you for allowing me to participate in the care of your patient.      Sincerely,     Linda Osullivan PA-C     [facility]  cc:   Ginny Baldwin DO  [address]  [place] [address]

## 2022-12-01 NOTE — PATIENT INSTRUCTIONS
Robin - it was good to meet you today!    Reviewed the odd sensation that you woke up with ... unsure if this will be a pattern ... agreed to keep watching given EKG today looked OK and your symptoms improved with using the bathroom  Cholesterol looks good!  Even with Pumpkin Pie!     PLAN:  CONSIDER getting stress test if any concerns or recurrent discomfort  [phone] (my nurse Mary)  All meds refilled  See Dr. Baldwin in 1 year with cholesterol levels

## 2023-10-15 ENCOUNTER — HOSPITAL ENCOUNTER (INPATIENT)
Facility: CLINIC | Age: 68
LOS: 3 days | Discharge: HOME | DRG: 282 | End: 2023-10-18
Attending: EMERGENCY MEDICINE | Admitting: STUDENT IN AN ORGANIZED HEALTH CARE EDUCATION/TRAINING PROGRAM
Payer: MEDICARE

## 2023-10-15 ENCOUNTER — APPOINTMENT (OUTPATIENT)
Dept: GENERAL RADIOLOGY | Facility: CLINIC | Age: 68
DRG: 282 | End: 2023-10-15
Attending: EMERGENCY MEDICINE
Payer: MEDICARE

## 2023-10-15 DIAGNOSIS — I21.4 NSTEMI (NON-ST ELEVATED MYOCARDIAL INFARCTION) (H): ICD-10-CM

## 2023-10-15 DIAGNOSIS — K76.9 LIVER LESION: Primary | ICD-10-CM

## 2023-10-15 DIAGNOSIS — R07.9 CHEST PAIN, UNSPECIFIED TYPE: ICD-10-CM

## 2023-10-15 LAB
ALBUMIN SERPL BCG-MCNC: 4.5 G/DL (ref 3.5–5.2)
ALP SERPL-CCNC: 62 U/L (ref 35–104)
ALT SERPL W P-5'-P-CCNC: 26 U/L (ref 0–50)
ANION GAP SERPL CALCULATED.3IONS-SCNC: 16 MMOL/L (ref 7–15)
AST SERPL W P-5'-P-CCNC: 43 U/L (ref 0–45)
ATRIAL RATE - MUSE: 62 BPM
BASO+EOS+MONOS # BLD AUTO: NORMAL 10*3/UL
BASO+EOS+MONOS NFR BLD AUTO: NORMAL %
BASOPHILS # BLD AUTO: 0.1 10E3/UL (ref 0–0.2)
BASOPHILS NFR BLD AUTO: 1 %
BILIRUB SERPL-MCNC: 0.3 MG/DL
BUN SERPL-MCNC: 20 MG/DL (ref 8–23)
CALCIUM SERPL-MCNC: 9.2 MG/DL (ref 8.8–10.2)
CHLORIDE SERPL-SCNC: 105 MMOL/L (ref 98–107)
CREAT SERPL-MCNC: 0.83 MG/DL (ref 0.51–0.95)
DEPRECATED HCO3 PLAS-SCNC: 23 MMOL/L (ref 22–29)
DIASTOLIC BLOOD PRESSURE - MUSE: NORMAL MMHG
EGFRCR SERPLBLD CKD-EPI 2021: 76 ML/MIN/1.73M2
EOSINOPHIL # BLD AUTO: 0.1 10E3/UL (ref 0–0.7)
EOSINOPHIL NFR BLD AUTO: 2 %
ERYTHROCYTE [DISTWIDTH] IN BLOOD BY AUTOMATED COUNT: 13.2 % (ref 10–15)
GLUCOSE SERPL-MCNC: 141 MG/DL (ref 70–99)
HCT VFR BLD AUTO: 40.7 % (ref 35–47)
HGB BLD-MCNC: 13.2 G/DL (ref 11.7–15.7)
HOLD SPECIMEN: NORMAL
HOLD SPECIMEN: NORMAL
IMM GRANULOCYTES # BLD: 0 10E3/UL
IMM GRANULOCYTES NFR BLD: 0 %
INTERPRETATION ECG - MUSE: NORMAL
LYMPHOCYTES # BLD AUTO: 3.6 10E3/UL (ref 0.8–5.3)
LYMPHOCYTES NFR BLD AUTO: 55 %
MCH RBC QN AUTO: 29.5 PG (ref 26.5–33)
MCHC RBC AUTO-ENTMCNC: 32.4 G/DL (ref 31.5–36.5)
MCV RBC AUTO: 91 FL (ref 78–100)
MONOCYTES # BLD AUTO: 0.5 10E3/UL (ref 0–1.3)
MONOCYTES NFR BLD AUTO: 8 %
NEUTROPHILS # BLD AUTO: 2.3 10E3/UL (ref 1.6–8.3)
NEUTROPHILS NFR BLD AUTO: 34 %
NRBC # BLD AUTO: 0 10E3/UL
NRBC BLD AUTO-RTO: 0 /100
P AXIS - MUSE: 67 DEGREES
PLATELET # BLD AUTO: 237 10E3/UL (ref 150–450)
POTASSIUM SERPL-SCNC: 3.9 MMOL/L (ref 3.4–5.3)
PR INTERVAL - MUSE: 150 MS
PROT SERPL-MCNC: 7.3 G/DL (ref 6.4–8.3)
QRS DURATION - MUSE: 100 MS
QT - MUSE: 472 MS
QTC - MUSE: 479 MS
R AXIS - MUSE: 75 DEGREES
RBC # BLD AUTO: 4.48 10E6/UL (ref 3.8–5.2)
SODIUM SERPL-SCNC: 144 MMOL/L (ref 135–145)
SYSTOLIC BLOOD PRESSURE - MUSE: NORMAL MMHG
T AXIS - MUSE: 23 DEGREES
TROPONIN T SERPL HS-MCNC: 10 NG/L
TROPONIN T SERPL HS-MCNC: 169 NG/L
VENTRICULAR RATE- MUSE: 62 BPM
WBC # BLD AUTO: 6.6 10E3/UL (ref 4–11)

## 2023-10-15 PROCEDURE — 250N000011 HC RX IP 250 OP 636: Mod: JZ | Performed by: EMERGENCY MEDICINE

## 2023-10-15 PROCEDURE — 36415 COLL VENOUS BLD VENIPUNCTURE: CPT | Performed by: STUDENT IN AN ORGANIZED HEALTH CARE EDUCATION/TRAINING PROGRAM

## 2023-10-15 PROCEDURE — 80053 COMPREHEN METABOLIC PANEL: CPT | Performed by: EMERGENCY MEDICINE

## 2023-10-15 PROCEDURE — 36415 COLL VENOUS BLD VENIPUNCTURE: CPT | Performed by: EMERGENCY MEDICINE

## 2023-10-15 PROCEDURE — 93005 ELECTROCARDIOGRAM TRACING: CPT | Mod: 76

## 2023-10-15 PROCEDURE — 250N000013 HC RX MED GY IP 250 OP 250 PS 637: Performed by: EMERGENCY MEDICINE

## 2023-10-15 PROCEDURE — 83036 HEMOGLOBIN GLYCOSYLATED A1C: CPT | Performed by: STUDENT IN AN ORGANIZED HEALTH CARE EDUCATION/TRAINING PROGRAM

## 2023-10-15 PROCEDURE — 96374 THER/PROPH/DIAG INJ IV PUSH: CPT

## 2023-10-15 PROCEDURE — 99223 1ST HOSP IP/OBS HIGH 75: CPT | Mod: AI | Performed by: STUDENT IN AN ORGANIZED HEALTH CARE EDUCATION/TRAINING PROGRAM

## 2023-10-15 PROCEDURE — 93005 ELECTROCARDIOGRAM TRACING: CPT

## 2023-10-15 PROCEDURE — 84484 ASSAY OF TROPONIN QUANT: CPT | Performed by: EMERGENCY MEDICINE

## 2023-10-15 PROCEDURE — 71046 X-RAY EXAM CHEST 2 VIEWS: CPT

## 2023-10-15 PROCEDURE — 99291 CRITICAL CARE FIRST HOUR: CPT | Mod: 25

## 2023-10-15 PROCEDURE — 250N000013 HC RX MED GY IP 250 OP 250 PS 637: Performed by: STUDENT IN AN ORGANIZED HEALTH CARE EDUCATION/TRAINING PROGRAM

## 2023-10-15 PROCEDURE — 85025 COMPLETE CBC W/AUTO DIFF WBC: CPT | Performed by: EMERGENCY MEDICINE

## 2023-10-15 PROCEDURE — 210N000001 HC R&B IMCU HEART CARE

## 2023-10-15 RX ORDER — ACETAMINOPHEN 500 MG
1000 TABLET ORAL EVERY 8 HOURS PRN
COMMUNITY

## 2023-10-15 RX ORDER — ASPIRIN 81 MG/1
81 TABLET ORAL DAILY
Status: DISCONTINUED | OUTPATIENT
Start: 2023-10-16 | End: 2023-10-18 | Stop reason: HOSPADM

## 2023-10-15 RX ORDER — NITROGLYCERIN 0.4 MG/1
0.4 TABLET SUBLINGUAL EVERY 5 MIN PRN
Status: DISCONTINUED | OUTPATIENT
Start: 2023-10-15 | End: 2023-10-18 | Stop reason: HOSPADM

## 2023-10-15 RX ORDER — ALENDRONATE SODIUM 35 MG/1
70 TABLET ORAL
COMMUNITY

## 2023-10-15 RX ORDER — MULTIVITAMIN WITH IRON
1 TABLET ORAL DAILY
COMMUNITY

## 2023-10-15 RX ORDER — ASPIRIN 81 MG/1
324 TABLET, CHEWABLE ORAL ONCE
Status: COMPLETED | OUTPATIENT
Start: 2023-10-15 | End: 2023-10-15

## 2023-10-15 RX ORDER — METOPROLOL SUCCINATE 25 MG/1
25 TABLET, EXTENDED RELEASE ORAL AT BEDTIME
Status: DISCONTINUED | OUTPATIENT
Start: 2023-10-15 | End: 2023-10-18 | Stop reason: HOSPADM

## 2023-10-15 RX ORDER — ACETAMINOPHEN 500 MG
1000 TABLET ORAL EVERY 8 HOURS PRN
Status: DISCONTINUED | OUTPATIENT
Start: 2023-10-15 | End: 2023-10-16

## 2023-10-15 RX ORDER — HEPARIN SODIUM 10000 [USP'U]/100ML
0-5000 INJECTION, SOLUTION INTRAVENOUS CONTINUOUS
Status: DISCONTINUED | OUTPATIENT
Start: 2023-10-15 | End: 2023-10-16

## 2023-10-15 RX ADMIN — HEPARIN SODIUM 650 UNITS/HR: 10000 INJECTION, SOLUTION INTRAVENOUS at 20:04

## 2023-10-15 RX ADMIN — ASPIRIN 81 MG CHEWABLE TABLET 324 MG: 81 TABLET CHEWABLE at 17:50

## 2023-10-15 RX ADMIN — METOPROLOL SUCCINATE 25 MG: 25 TABLET, EXTENDED RELEASE ORAL at 23:36

## 2023-10-15 ASSESSMENT — ACTIVITIES OF DAILY LIVING (ADL)
ADLS_ACUITY_SCORE: 33
ADLS_ACUITY_SCORE: 35

## 2023-10-15 NOTE — ED TRIAGE NOTES
was walking dog around 1600 when chest felt a burst of cold and then felt left sided chest pain. States pain stays in left chest but left arm feels like its falling asleep. Hx MI in 2008.      Triage Assessment (Adult)       Row Name 10/15/23 3183          Triage Assessment    Airway WDL WDL        Respiratory WDL    Respiratory WDL WDL        Skin Circulation/Temperature WDL    Skin Circulation/Temperature WDL WDL        Cardiac WDL    Cardiac WDL --  left sided chest pain and left arm heaviness        Peripheral/Neurovascular WDL    Peripheral Neurovascular WDL WDL        Cognitive/Neuro/Behavioral WDL    Cognitive/Neuro/Behavioral WDL WDL

## 2023-10-15 NOTE — ED PROVIDER NOTES
History     Chief Complaint:  Chest Pain       The history is provided by the patient.      Robin Cazares is a 68 year old female with a history of a MI in 2008 and hypertension who presents to the ED after an episode of left sided chest pain that started around 1600 while walking her dog. Patient states that she felt a burst of cold and then felt left sided chest pain that radiated into her left arm. She describes the feeling in her left arm as numbness. The whole episodes lasted around an hour. She states that it felt completely different from her MI in the past. Patient denies vomiting, swelling in legs, light headedness, dizziness, recent illness, diarrhea, or fever. Patient is currently taking lisinopril, metoprolol, simvastin, and aspirin 81 mg. She denies ever smoking.    Independent Historian:   None - Patient Only    Review of External Notes:   I reviewed the patient's heart catheterization note from 8/15/2008    Medications:    aspirin (ASA) 81 MG EC tablet  lisinopril (ZESTRIL) 2.5 MG tablet  metoprolol succinate ER (TOPROL XL) 25 MG 24 hr tablet  Multiple vitamin TABS  simvastatin (ZOCOR) 40 MG tablet      Past Medical History:    Past Medical History:   Diagnosis Date    CAD (coronary artery disease)     Hyperlipidemia     Internal hemorrhoids     MI (myocardial infarction) (H)     Osteopenia     Osteoporosis     Palpitations     S/P breast lumpectomy     Vaginal bleeding      Past Surgical History:    Past Surgical History:   Procedure Laterality Date    HEART CATH, ANGIOPLASTY      8/2008 angioplasty occluded distal OM branch    OPEN REDUCTION INTERNAL FIXATION FEMUR PROXIMAL Right 6/2/2018    Procedure: OPEN REDUCTION INTERNAL FIXATION FEMUR PROXIMAL;  OPEN REDUCTION INTERNAL FIXATION RIGHT FEMUR FRACTURE;  Surgeon: Perico Zapata MD;  Location:  OR     Physical Exam   Patient Vitals for the past 24 hrs:   BP Temp Temp src Pulse Resp SpO2 Height Weight   10/15/23 1830 (!) 144/82  "-- -- 59 15 -- -- --   10/15/23 1800 135/85 -- -- 60 14 -- -- --   10/15/23 1730 (!) 144/89 -- -- 63 (!) 9 97 % -- --   10/15/23 1653 (!) 168/83 97.1  F (36.2  C) Temporal 70 22 100 % 1.702 m (5' 7\") 55.3 kg (122 lb)     Physical Exam  General: Alert and cooperative with exam. Patient in mild distress. Normal mentation.  Head:  Scalp is NC/AT  Eyes:  No scleral icterus, PERRL  ENT:  The external nose and ears are normal. The oropharynx is normal and without erythema; mucus membranes are moist.   Neck:  Normal range of motion without rigidity.  CV:  Regular rate and rhythm    No pathologic murmur   Resp:  Breath sounds are clear bilaterally    Non-labored, no retractions or accessory muscle use  GI:  Abdomen is soft, no distension, no tenderness. No peritoneal signs  MS:  No lower extremity edema   Skin:  Warm and dry, No rash or lesions noted.  Neuro: Oriented x 3. No gross motor deficits.    Emergency Department Course   ECG  ECG taken at 1652, ECG read at 1655  Normal sinus rhythm  Septal infarct, age undetermined  Abnormal ECG  Septal infarct now present as compared to prior, dated 12/1/2022.  Rate 65 bpm. AR interval 148 ms. QRS duration 102 ms. QT/QTc 466/484 ms. P-R-T axes 86 87 33.     ECG:  ECG taken at 1949, ECG read at 1950  Normal sinus rhythm  Nonspecific T wave abnormality  Abnormal ECG  Rate 62 bpm. AR interval 150 ms. QRS duration 100 ms. QT/QTc 474/479 ms. P-R-T axes  67 75 23.     Imaging:  Chest XR,  PA & LAT   Final Result   IMPRESSION: Negative chest.        Laboratory:  Labs Ordered and Resulted from Time of ED Arrival to Time of ED Departure   COMPREHENSIVE METABOLIC PANEL - Abnormal       Result Value    Sodium 144      Potassium 3.9      Carbon Dioxide (CO2) 23      Anion Gap 16 (*)     Urea Nitrogen 20.0      Creatinine 0.83      GFR Estimate 76      Calcium 9.2      Chloride 105      Glucose 141 (*)     Alkaline Phosphatase 62      AST 43      ALT 26      Protein Total 7.3      Albumin 4.5  "     Bilirubin Total 0.3     TROPONIN T, HIGH SENSITIVITY - Abnormal    Troponin T, High Sensitivity 169 (*)    TROPONIN T, HIGH SENSITIVITY - Normal    Troponin T, High Sensitivity 10     CBC WITH PLATELETS AND DIFFERENTIAL    WBC Count 6.6      RBC Count 4.48      Hemoglobin 13.2      Hematocrit 40.7      MCV 91      MCH 29.5      MCHC 32.4      RDW 13.2      Platelet Count 237      % Neutrophils 34      % Lymphocytes 55      % Monocytes 8      Mids % (Monos, Eos, Basos)        % Eosinophils 2      % Basophils 1      % Immature Granulocytes 0      NRBCs per 100 WBC 0      Absolute Neutrophils 2.3      Absolute Lymphocytes 3.6      Absolute Monocytes 0.5      Mids Abs (Monos, Eos, Basos)        Absolute Eosinophils 0.1      Absolute Basophils 0.1      Absolute Immature Granulocytes 0.0      Absolute NRBCs 0.0       Emergency Department Course & Assessments:         Interventions:  Medications   heparin infusion 25,000 units in D5W 250 mL ANTICOAGULANT (650 Units/hr Intravenous $New Bag 10/15/23 2004)   aspirin (ASA) chewable tablet 324 mg (324 mg Oral $Given 10/15/23 1750)   heparin loading dose for LOW INTENSITY TREATMENT * Give BEFORE starting heparin infusion (3,300 Units Intravenous $Given 10/15/23 2003)     Independent Interpretation (X-rays, CTs, rhythm strip):  I reviewed the patient's chest x-ray, no evidence of infiltrate, effusion, or pneumothorax    Consultations/Discussion of Management or Tests:  ED Course as of 10/15/23 2036   Sun Oct 15, 2023   1730 I obtained history and examined the patient as noted above.     1817 I rechecked the patient and explained findings.    2034 I spoke with Sabrina Denise MD, hospitalist, regarding the patient's presentation and plan of care.         Social Determinants of Health affecting care:   None    Disposition:  The patient was admitted to the hospital under the care of Sabrina Hemphill MD, hospitalist.     Impression & Plan    CMS Diagnoses:  None    Medical Decision Making:  Robin Cazares is a 68 year old female who presents with chest pain; currently resolved.  Her history and risk factor analysis are significant for previous MI.  Chest x-ray, labs, and EKG obtained.  EKG without evidence of acute ischemia or infarction and subsequent EKG without dynamic change.  Initial troponin was within the normal range, however repeat troponin returned significantly elevated.  Presentation concerning for NSTEMI.  She received 324 mg aspirin on arrival and was placed on a heparin infusion.  Remained asymptomatic throughout my care.  Chest x-ray without significant findings.  Will admit to [facility] with the hospitalist service for further care and cardiology consultation.    Diagnosis:    ICD-10-CM    1. NSTEMI (non-ST elevated myocardial infarction) (H)  I21.4       2. Chest pain, unspecified type  R07.9         Scribe Disclosure:  I, Adarsh Mcclendon, am serving as a scribe at 8:36 PM on 10/15/2023 to document services personally performed by Dagoberto Leigh DO based on my observations and the provider's statements to me.   10/15/2023   Dagoberto Leigh DO O'Neill, Christopher Warren, DO  10/15/23 2123

## 2023-10-16 LAB
ANION GAP SERPL CALCULATED.3IONS-SCNC: 11 MMOL/L (ref 7–15)
BUN SERPL-MCNC: 14.8 MG/DL (ref 8–23)
CALCIUM SERPL-MCNC: 8.7 MG/DL (ref 8.8–10.2)
CHLORIDE SERPL-SCNC: 106 MMOL/L (ref 98–107)
CHOLEST SERPL-MCNC: 154 MG/DL
CREAT SERPL-MCNC: 0.79 MG/DL (ref 0.51–0.95)
DEPRECATED HCO3 PLAS-SCNC: 25 MMOL/L (ref 22–29)
EGFRCR SERPLBLD CKD-EPI 2021: 81 ML/MIN/1.73M2
ERYTHROCYTE [DISTWIDTH] IN BLOOD BY AUTOMATED COUNT: 13.1 % (ref 10–15)
ERYTHROCYTE [DISTWIDTH] IN BLOOD BY AUTOMATED COUNT: 13.1 % (ref 10–15)
GLUCOSE SERPL-MCNC: 97 MG/DL (ref 70–99)
HBA1C MFR BLD: 5.8 %
HCT VFR BLD AUTO: 35.9 % (ref 35–47)
HCT VFR BLD AUTO: 38 % (ref 35–47)
HDLC SERPL-MCNC: 60 MG/DL
HGB BLD-MCNC: 11.7 G/DL (ref 11.7–15.7)
HGB BLD-MCNC: 12.4 G/DL (ref 11.7–15.7)
LDLC SERPL CALC-MCNC: 81 MG/DL
MCH RBC QN AUTO: 29.1 PG (ref 26.5–33)
MCH RBC QN AUTO: 29.3 PG (ref 26.5–33)
MCHC RBC AUTO-ENTMCNC: 32.6 G/DL (ref 31.5–36.5)
MCHC RBC AUTO-ENTMCNC: 32.6 G/DL (ref 31.5–36.5)
MCV RBC AUTO: 89 FL (ref 78–100)
MCV RBC AUTO: 90 FL (ref 78–100)
NONHDLC SERPL-MCNC: 94 MG/DL
PLATELET # BLD AUTO: 186 10E3/UL (ref 150–450)
PLATELET # BLD AUTO: 204 10E3/UL (ref 150–450)
POTASSIUM SERPL-SCNC: 3.7 MMOL/L (ref 3.4–5.3)
RBC # BLD AUTO: 4.02 10E6/UL (ref 3.8–5.2)
RBC # BLD AUTO: 4.23 10E6/UL (ref 3.8–5.2)
SODIUM SERPL-SCNC: 142 MMOL/L (ref 135–145)
TRIGL SERPL-MCNC: 64 MG/DL
TROPONIN T SERPL HS-MCNC: 177 NG/L
TROPONIN T SERPL HS-MCNC: 349 NG/L
TROPONIN T SERPL HS-MCNC: 366 NG/L
UFH PPP CHRO-ACNC: 0.31 IU/ML
WBC # BLD AUTO: 5.3 10E3/UL (ref 4–11)
WBC # BLD AUTO: 5.7 10E3/UL (ref 4–11)

## 2023-10-16 PROCEDURE — 99232 SBSQ HOSP IP/OBS MODERATE 35: CPT | Performed by: HOSPITALIST

## 2023-10-16 PROCEDURE — 250N000011 HC RX IP 250 OP 636: Mod: JZ | Performed by: INTERNAL MEDICINE

## 2023-10-16 PROCEDURE — 250N000011 HC RX IP 250 OP 636: Performed by: INTERNAL MEDICINE

## 2023-10-16 PROCEDURE — 250N000013 HC RX MED GY IP 250 OP 250 PS 637: Performed by: STUDENT IN AN ORGANIZED HEALTH CARE EDUCATION/TRAINING PROGRAM

## 2023-10-16 PROCEDURE — C1894 INTRO/SHEATH, NON-LASER: HCPCS | Performed by: INTERNAL MEDICINE

## 2023-10-16 PROCEDURE — 99152 MOD SED SAME PHYS/QHP 5/>YRS: CPT | Performed by: INTERNAL MEDICINE

## 2023-10-16 PROCEDURE — 85520 HEPARIN ASSAY: CPT | Performed by: STUDENT IN AN ORGANIZED HEALTH CARE EDUCATION/TRAINING PROGRAM

## 2023-10-16 PROCEDURE — 84484 ASSAY OF TROPONIN QUANT: CPT | Performed by: INTERNAL MEDICINE

## 2023-10-16 PROCEDURE — 99222 1ST HOSP IP/OBS MODERATE 55: CPT | Mod: 25 | Performed by: INTERNAL MEDICINE

## 2023-10-16 PROCEDURE — B2151ZZ FLUOROSCOPY OF LEFT HEART USING LOW OSMOLAR CONTRAST: ICD-10-PCS | Performed by: INTERNAL MEDICINE

## 2023-10-16 PROCEDURE — 93005 ELECTROCARDIOGRAM TRACING: CPT

## 2023-10-16 PROCEDURE — 80048 BASIC METABOLIC PNL TOTAL CA: CPT | Performed by: STUDENT IN AN ORGANIZED HEALTH CARE EDUCATION/TRAINING PROGRAM

## 2023-10-16 PROCEDURE — 250N000013 HC RX MED GY IP 250 OP 250 PS 637: Performed by: HOSPITALIST

## 2023-10-16 PROCEDURE — 85027 COMPLETE CBC AUTOMATED: CPT | Performed by: STUDENT IN AN ORGANIZED HEALTH CARE EDUCATION/TRAINING PROGRAM

## 2023-10-16 PROCEDURE — 93458 L HRT ARTERY/VENTRICLE ANGIO: CPT | Performed by: INTERNAL MEDICINE

## 2023-10-16 PROCEDURE — 93010 ELECTROCARDIOGRAM REPORT: CPT | Performed by: INTERNAL MEDICINE

## 2023-10-16 PROCEDURE — 84484 ASSAY OF TROPONIN QUANT: CPT | Performed by: STUDENT IN AN ORGANIZED HEALTH CARE EDUCATION/TRAINING PROGRAM

## 2023-10-16 PROCEDURE — 272N000001 HC OR GENERAL SUPPLY STERILE: Performed by: INTERNAL MEDICINE

## 2023-10-16 PROCEDURE — 93458 L HRT ARTERY/VENTRICLE ANGIO: CPT | Mod: 26 | Performed by: INTERNAL MEDICINE

## 2023-10-16 PROCEDURE — 250N000009 HC RX 250: Performed by: INTERNAL MEDICINE

## 2023-10-16 PROCEDURE — 210N000001 HC R&B IMCU HEART CARE

## 2023-10-16 PROCEDURE — 80061 LIPID PANEL: CPT | Performed by: STUDENT IN AN ORGANIZED HEALTH CARE EDUCATION/TRAINING PROGRAM

## 2023-10-16 PROCEDURE — 250N000013 HC RX MED GY IP 250 OP 250 PS 637: Performed by: INTERNAL MEDICINE

## 2023-10-16 PROCEDURE — 999N000184 HC STATISTIC TELEMETRY

## 2023-10-16 PROCEDURE — 36415 COLL VENOUS BLD VENIPUNCTURE: CPT | Performed by: STUDENT IN AN ORGANIZED HEALTH CARE EDUCATION/TRAINING PROGRAM

## 2023-10-16 PROCEDURE — C1887 CATHETER, GUIDING: HCPCS | Performed by: INTERNAL MEDICINE

## 2023-10-16 PROCEDURE — 36415 COLL VENOUS BLD VENIPUNCTURE: CPT | Performed by: INTERNAL MEDICINE

## 2023-10-16 PROCEDURE — 99153 MOD SED SAME PHYS/QHP EA: CPT | Performed by: INTERNAL MEDICINE

## 2023-10-16 PROCEDURE — C1769 GUIDE WIRE: HCPCS | Performed by: INTERNAL MEDICINE

## 2023-10-16 PROCEDURE — 999N000071 HC STATISTIC HEART CATH LAB OR EP LAB

## 2023-10-16 PROCEDURE — 258N000003 HC RX IP 258 OP 636: Performed by: INTERNAL MEDICINE

## 2023-10-16 PROCEDURE — B2111ZZ FLUOROSCOPY OF MULTIPLE CORONARY ARTERIES USING LOW OSMOLAR CONTRAST: ICD-10-PCS | Performed by: INTERNAL MEDICINE

## 2023-10-16 RX ORDER — POTASSIUM CHLORIDE 1500 MG/1
20 TABLET, EXTENDED RELEASE ORAL
Status: COMPLETED | OUTPATIENT
Start: 2023-10-16 | End: 2023-10-16

## 2023-10-16 RX ORDER — ACETAMINOPHEN 325 MG/1
650 TABLET ORAL EVERY 4 HOURS PRN
Status: DISCONTINUED | OUTPATIENT
Start: 2023-10-16 | End: 2023-10-18 | Stop reason: HOSPADM

## 2023-10-16 RX ORDER — NALOXONE HYDROCHLORIDE 0.4 MG/ML
0.2 INJECTION, SOLUTION INTRAMUSCULAR; INTRAVENOUS; SUBCUTANEOUS
Status: ACTIVE | OUTPATIENT
Start: 2023-10-16 | End: 2023-10-17

## 2023-10-16 RX ORDER — NALOXONE HYDROCHLORIDE 0.4 MG/ML
0.4 INJECTION, SOLUTION INTRAMUSCULAR; INTRAVENOUS; SUBCUTANEOUS
Status: ACTIVE | OUTPATIENT
Start: 2023-10-16 | End: 2023-10-17

## 2023-10-16 RX ORDER — SIMVASTATIN 40 MG
40 TABLET ORAL AT BEDTIME
Status: DISCONTINUED | OUTPATIENT
Start: 2023-10-16 | End: 2023-10-16

## 2023-10-16 RX ORDER — SODIUM CHLORIDE 9 MG/ML
75 INJECTION, SOLUTION INTRAVENOUS CONTINUOUS
Status: ACTIVE | OUTPATIENT
Start: 2023-10-16 | End: 2023-10-16

## 2023-10-16 RX ORDER — LISINOPRIL 2.5 MG/1
2.5 TABLET ORAL DAILY
Status: DISCONTINUED | OUTPATIENT
Start: 2023-10-16 | End: 2023-10-17

## 2023-10-16 RX ORDER — OXYCODONE HYDROCHLORIDE 5 MG/1
5 TABLET ORAL EVERY 4 HOURS PRN
Status: DISCONTINUED | OUTPATIENT
Start: 2023-10-16 | End: 2023-10-18 | Stop reason: HOSPADM

## 2023-10-16 RX ORDER — ASPIRIN 325 MG
325 TABLET ORAL ONCE
Status: COMPLETED | OUTPATIENT
Start: 2023-10-16 | End: 2023-10-16

## 2023-10-16 RX ORDER — FENTANYL CITRATE 50 UG/ML
INJECTION, SOLUTION INTRAMUSCULAR; INTRAVENOUS
Status: DISCONTINUED | OUTPATIENT
Start: 2023-10-16 | End: 2023-10-16 | Stop reason: HOSPADM

## 2023-10-16 RX ORDER — ASPIRIN 81 MG/1
243 TABLET, CHEWABLE ORAL ONCE
Status: COMPLETED | OUTPATIENT
Start: 2023-10-16 | End: 2023-10-16

## 2023-10-16 RX ORDER — FENTANYL CITRATE 50 UG/ML
25 INJECTION, SOLUTION INTRAMUSCULAR; INTRAVENOUS
Status: DISCONTINUED | OUTPATIENT
Start: 2023-10-16 | End: 2023-10-18 | Stop reason: HOSPADM

## 2023-10-16 RX ORDER — LIDOCAINE 40 MG/G
CREAM TOPICAL
Status: DISCONTINUED | OUTPATIENT
Start: 2023-10-16 | End: 2023-10-16 | Stop reason: HOSPADM

## 2023-10-16 RX ORDER — ATROPINE SULFATE 0.1 MG/ML
0.5 INJECTION INTRAVENOUS
Status: ACTIVE | OUTPATIENT
Start: 2023-10-16 | End: 2023-10-17

## 2023-10-16 RX ORDER — SODIUM CHLORIDE 9 MG/ML
INJECTION, SOLUTION INTRAVENOUS CONTINUOUS
Status: DISCONTINUED | OUTPATIENT
Start: 2023-10-16 | End: 2023-10-16 | Stop reason: HOSPADM

## 2023-10-16 RX ORDER — HEPARIN SODIUM 1000 [USP'U]/ML
INJECTION, SOLUTION INTRAVENOUS; SUBCUTANEOUS
Status: DISCONTINUED | OUTPATIENT
Start: 2023-10-16 | End: 2023-10-16 | Stop reason: HOSPADM

## 2023-10-16 RX ORDER — FLUMAZENIL 0.1 MG/ML
0.2 INJECTION, SOLUTION INTRAVENOUS
Status: ACTIVE | OUTPATIENT
Start: 2023-10-16 | End: 2023-10-17

## 2023-10-16 RX ORDER — LORAZEPAM 0.5 MG/1
0.5 TABLET ORAL
Status: DISCONTINUED | OUTPATIENT
Start: 2023-10-16 | End: 2023-10-16 | Stop reason: HOSPADM

## 2023-10-16 RX ORDER — MORPHINE SULFATE 2 MG/ML
1 INJECTION, SOLUTION INTRAMUSCULAR; INTRAVENOUS EVERY 4 HOURS PRN
Status: DISCONTINUED | OUTPATIENT
Start: 2023-10-16 | End: 2023-10-18 | Stop reason: HOSPADM

## 2023-10-16 RX ORDER — HEPARIN SODIUM 10000 [USP'U]/100ML
0-5000 INJECTION, SOLUTION INTRAVENOUS CONTINUOUS
Status: DISCONTINUED | OUTPATIENT
Start: 2023-10-16 | End: 2023-10-16

## 2023-10-16 RX ORDER — IOPAMIDOL 755 MG/ML
INJECTION, SOLUTION INTRAVASCULAR
Status: DISCONTINUED | OUTPATIENT
Start: 2023-10-16 | End: 2023-10-16 | Stop reason: HOSPADM

## 2023-10-16 RX ORDER — ROSUVASTATIN CALCIUM 20 MG/1
40 TABLET, COATED ORAL AT BEDTIME
Status: DISCONTINUED | OUTPATIENT
Start: 2023-10-16 | End: 2023-10-18 | Stop reason: HOSPADM

## 2023-10-16 RX ORDER — LORAZEPAM 2 MG/ML
0.5 INJECTION INTRAMUSCULAR
Status: DISCONTINUED | OUTPATIENT
Start: 2023-10-16 | End: 2023-10-16 | Stop reason: HOSPADM

## 2023-10-16 RX ORDER — OXYCODONE HYDROCHLORIDE 5 MG/1
10 TABLET ORAL EVERY 4 HOURS PRN
Status: DISCONTINUED | OUTPATIENT
Start: 2023-10-16 | End: 2023-10-18 | Stop reason: HOSPADM

## 2023-10-16 RX ADMIN — LISINOPRIL 2.5 MG: 2.5 TABLET ORAL at 08:20

## 2023-10-16 RX ADMIN — SODIUM CHLORIDE 150 ML/HR: 9 INJECTION, SOLUTION INTRAVENOUS at 11:24

## 2023-10-16 RX ADMIN — ASPIRIN 81 MG 243 MG: 81 TABLET ORAL at 11:32

## 2023-10-16 RX ADMIN — ROSUVASTATIN CALCIUM 40 MG: 20 TABLET, FILM COATED ORAL at 21:06

## 2023-10-16 RX ADMIN — POTASSIUM CHLORIDE 20 MEQ: 1500 TABLET, EXTENDED RELEASE ORAL at 11:33

## 2023-10-16 RX ADMIN — ASPIRIN 81 MG: 81 TABLET, COATED ORAL at 08:20

## 2023-10-16 ASSESSMENT — ACTIVITIES OF DAILY LIVING (ADL)
ADLS_ACUITY_SCORE: 35
ADLS_ACUITY_SCORE: 20
ADLS_ACUITY_SCORE: 35
ADLS_ACUITY_SCORE: 20

## 2023-10-16 NOTE — CONSULTS
[facility]    Cardiology Consultation     Date of Admission:  10/15/2023    Assessment & Plan   Robin Cazares is a 68 year old female who was admitted on 10/15/2023.    1.  Acute nontransmural myocardial infarction.  Initial troponin 169, peak 366.  Chest pain symptoms improved but not entirely resolved, with slight ongoing chest aching discomfort rated 0-1/10.  ECG shows minor downsloping inferior lateral ST segment depression, similar to previous ECGs.    2.  Coronary artery disease with history of inferior STEMI in 2008 due to occlusion of the distal segment of a moderate-sized second obtuse marginal artery.  This was opened with angioplasty alone, complicated by dissection, but managed medically without stenting due to reestablishment of YOLI-3 flow and high risk for further complications.  The patient has done well since then without recurrent chest discomfort symptoms until now.    3.  History of exercise-induced RVOT ventricular tachycardia, treated with beta-blockers successfully.    4.  Dyslipidemia, with suboptimal LDL control, greater than 70 consistently.    5.  Family history of coronary artery disease.    6.  Prediabetes with hemoglobin A1c of 5.8    Recommendations:    Continue heparin, aspirin, metoprolol, and lisinopril.  Blood pressure and heart rate control appears adequate.  Nitroglycerin IV infusion if chest pain increases.  Urgent coronary angiography and possible coronary intervention.  Discontinue simvastatin.  Start rosuvastatin 40 mg daily.    Risks and benefits of left heart catheterization and coronary angiogram were discussed with the patient in detail. Risk estimated at 0.1-0.3% for diagnostic angio and 1-2% for PCI, including risk of stroke, MI, death, emergent bypass, contrast induced allergic reaction, renal dysfunction, and vascular complications (including bleeding and transfusion) were discussed. Patient understands and wishes to proceed.  "        High complexity     WILTON DICKEY MD, MD    Primary Care Physician   Physician No Ref-Primary    Reason for Consult   Reason for consult: I was asked by hospitalist service to evaluate this patient for chest pain and elevated troponin.    History of Present Illness   Robin Cazares is a 68 year old female who presents with chest pain.  She was out for a walk yesterday and suddenly felt the onset of a \"cold\" sensation in her left chest followed by moderately severe left-sided chest pressure and numbness in the left arm.  She walked about half mile home and the chest discomfort persisted.  She drove herself to the emergency room.  She did not have shortness of breath, palpitations, lightheadedness, or diaphoresis.    Her chest discomfort seem to improve after she sat in the emergency room at rest and perhaps was gone entirely in about an hour.  She tells me that she did not receive any nitroglycerin.  The chest discomfort seem to resolve without specific treatment.  Her initial troponin was 169 and caleb to 366 before falling to 349 this morning.  She is now \"essentially\" chest pain-free.  She states she has minimal, 0-1/10 chest discomfort.    She has a history of coronary artery disease with inferior STEMI in 2008 treated with emergency coronary angiography.  This showed occlusion of the distal portion of a second obtuse marginal vessel.  The circumflex was quite tortuous making revascularization difficult.  Angioplasty was attempted which caused dissection of the distal vessel at the site of the occlusion, but restored YOLI-3 flow.  A stent could not be placed.  The dissection was not treated further and was managed medically.  She was apparently done well since then without recurrent angina until now.  She did have issues with exercise-induced RVOT ventricular tachycardia and was seen by EP.  This was managed with metoprolol and she is apparently done well.    Her primary cardiac risk factors " dyslipidemia and family history of heart disease.  She has never been a smoker, does not have hypertension, and has not previously been diagnosed with diabetes.  Her hemoglobin A1c here is 5.8.  Her LDL has been consistently in the 80s and 90s with simvastatin 20 mg daily.    Past Medical History   Past Medical History:   Diagnosis Date    CAD (coronary artery disease)     8/2008 angioplasty occluded distal OM branch    Hyperlipidemia     Internal hemorrhoids     MI (myocardial infarction) (H)     2008    Osteopenia     Osteoporosis     Palpitations     S/P breast lumpectomy     Vaginal bleeding          Past Surgical History   Past Surgical History:   Procedure Laterality Date    HEART CATH, ANGIOPLASTY      8/2008 angioplasty occluded distal OM branch    OPEN REDUCTION INTERNAL FIXATION FEMUR PROXIMAL Right 6/2/2018    Procedure: OPEN REDUCTION INTERNAL FIXATION FEMUR PROXIMAL;  OPEN REDUCTION INTERNAL FIXATION RIGHT FEMUR FRACTURE;  Surgeon: Perico Zapata MD;  Location:  OR         Prior to Admission Medications   Prior to Admission Medications   Prescriptions Last Dose Informant Patient Reported? Taking?   Multiple vitamin TABS 10/15/2023 at 1120 Self Yes Yes   Sig: Take 1 tablet by mouth daily   acetaminophen (TYLENOL) 500 MG tablet Unknown  Yes Yes   Sig: Take 1,000 mg by mouth every 8 hours as needed for mild pain   alendronate (FOSAMAX) 35 MG tablet 10/15/2023 at am  Yes Yes   Sig: Take 35 mg by mouth every 7 days Takes on Sunday morning   aspirin (ASA) 81 MG EC tablet 10/15/2023 at 324 mg  Yes Yes   Sig: Take 81 mg by mouth daily   lisinopril (ZESTRIL) 2.5 MG tablet 10/15/2023 at am  No Yes   Sig: Take 1 tablet (2.5 mg) by mouth daily   magnesium 250 MG tablet 10/15/2023 at 1120  Yes Yes   Sig: Take 1 tablet by mouth daily   metoprolol succinate ER (TOPROL XL) 25 MG 24 hr tablet 10/14/2023 at hs  No Yes   Sig: Take 1 tablet (25 mg) by mouth At Bedtime   simvastatin (ZOCOR) 40 MG tablet  "10/14/2023 at hs  No Yes   Sig: Take 1 tablet (40 mg) by mouth At Bedtime      Facility-Administered Medications: None     Current Facility-Administered Medications   Medication Dose Route Frequency    aspirin  81 mg Oral Daily    lisinopril  2.5 mg Oral Daily    metoprolol succinate ER  25 mg Oral At Bedtime    simvastatin  40 mg Oral At Bedtime     Current Facility-Administered Medications   Medication Last Rate    heparin 650 Units/hr (10/16/23 0820)    - MEDICATION INSTRUCTIONS -       Allergies   Allergies   Allergen Reactions    Fosamax [Alendronate] Palpitations     Occurred when pt was on 70mg weekly.  Reattempting to take medication       Social History    reports that she has never smoked. She has never used smokeless tobacco. She reports current alcohol use. She reports that she does not use drugs.      Family History   I have reviewed this patient's family history and updated it with pertinent information if needed.  Family History   Problem Relation Age of Onset    Heart Disease Father     Leukemia Mother     Heart Disease Other           Review of Systems   A comprehensive review of system was performed and is negative other than that noted in the HPI or here.     Physical Exam   Vital Signs with Ranges  Temp:  [97.1  F (36.2  C)-98  F (36.7  C)] 98  F (36.7  C)  Pulse:  [50-70] 62  Resp:  [9-22] 16  BP: (106-168)/(60-89) 137/86  SpO2:  [97 %-100 %] 98 %  Wt Readings from Last 4 Encounters:   10/16/23 55.8 kg (123 lb)   12/01/22 58.1 kg (128 lb)   12/14/21 60 kg (132 lb 4.8 oz)   10/08/19 60.8 kg (134 lb 1.6 oz)     No intake/output data recorded.      Vitals: /86 (BP Location: Left arm)   Pulse 62   Temp 98  F (36.7  C) (Oral)   Resp 16   Ht 1.702 m (5' 7\")   Wt 55.8 kg (123 lb)   SpO2 98%   BMI 19.26 kg/m      Physical Exam:   General - Alert and oriented to time place and person in no acute distress  Eyes - No scleral icterus  HEENT - Neck supple, moist mucous membranes  Cardiovascular " "-regular rate and rhythm without murmur  Extremities - There is no lower extremity edema  Respiratory -clear to auscultation  Skin - No pallor or cyanosis  Gastrointestinal - Non tender and non distended without rebound or guarding  Psych - Appropriate affect   Neurological - No gross motor neurological focal deficits    No lab results found in last 7 days.    Invalid input(s): \"TROPONINIES\"    Recent Labs   Lab 10/16/23  0714 10/16/23  0554 10/15/23  1702   WBC 5.3 5.7 6.6   HGB 12.4 11.7 13.2   MCV 90 89 91    186 237   NA  --  142 144   POTASSIUM  --  3.7 3.9   CHLORIDE  --  106 105   CO2  --  25 23   BUN  --  14.8 20.0   CR  --  0.79 0.83   GFRESTIMATED  --  81 76   ANIONGAP  --  11 16*   HEIDI  --  8.7* 9.2   GLC  --  97 141*   ALBUMIN  --   --  4.5   PROTTOTAL  --   --  7.3   BILITOTAL  --   --  0.3   ALKPHOS  --   --  62   ALT  --   --  26   AST  --   --  43     Recent Labs   Lab Test 10/16/23  0554 11/29/22  0851 09/19/16  0726 08/24/15  0822   CHOL 154 173   < > 169   HDL 60 65   < > 71   LDL 81 94   < > 80   TRIG 64 69   < > 91   CHOLHDLRATIO  --   --   --  2.4    < > = values in this interval not displayed.     Recent Labs   Lab 10/16/23  0714 10/16/23  0554 10/15/23  1702   WBC 5.3 5.7 6.6   HGB 12.4 11.7 13.2   HCT 38.0 35.9 40.7   MCV 90 89 91    186 237     No results for input(s): \"PH\", \"PHV\", \"PO2\", \"PO2V\", \"SAT\", \"PCO2\", \"PCO2V\", \"HCO3\", \"HCO3V\" in the last 168 hours.  No results for input(s): \"NTBNPI\", \"NTBNP\" in the last 168 hours.  No results for input(s): \"DD\" in the last 168 hours.  No results for input(s): \"SED\", \"CRP\" in the last 168 hours.  Recent Labs   Lab 10/16/23  0714 10/16/23  0554 10/15/23  1702    186 237     No results for input(s): \"TSH\" in the last 168 hours.  No results for input(s): \"COLOR\", \"APPEARANCE\", \"URINEGLC\", \"URINEBILI\", \"URINEKETONE\", \"SG\", \"UBLD\", \"URINEPH\", \"PROTEIN\", \"UROBILINOGEN\", \"NITRITE\", \"LEUKEST\", \"RBCU\", \"WBCU\" in the last 168 " hours.    Imaging:  Recent Results (from the past 48 hour(s))   Chest XR,  PA & LAT    Narrative    EXAM: XR CHEST 2 VIEWS  LOCATION: [facility]  DATE: 10/15/2023    INDICATION: chest pain  COMPARISON: 8/15/2008      Impression    IMPRESSION: Negative chest.       Echo:  No results found for this or any previous visit (from the past 4320 hour(s)).    Clinically Significant Risk Factors Present on Admission                # Drug Induced Platelet Defect: home medication list includes an antiplatelet medication   # Hypertension: Home medication list includes antihypertensive(s)                Cardiac Arrhythmia: Ventricular tachycardia

## 2023-10-16 NOTE — PHARMACY-ADMISSION MEDICATION HISTORY
Pharmacist Admission Medication History    Admission medication history is complete. The information provided in this note is only as accurate as the sources available at the time of the update.    Information Source(s): Patient and CareEverywhere/SureScripts via in-person    Pertinent Information: Recently switched from Prolia to Fosamax.  Pt had palpitations attributed to being on Fosamax 70mg weekly.  Pt was recently started on 35mg weekly with no issues    Changes made to PTA medication list:  Added: fosamax, mg, apap  Deleted: calcium, percocet, prolia  Changed: None    Medication Affordability:  Not including over the counter (OTC) medications, was there a time in the past 3 months when you did not take your medications as prescribed because of cost?: No    Allergies reviewed with patient and updates made in EHR: yes    Medication History Completed By: Aldo Wolf RPH 10/15/2023 8:39 PM    PTA Med List   Medication Sig Last Dose    acetaminophen (TYLENOL) 500 MG tablet Take 1,000 mg by mouth every 8 hours as needed for mild pain Unknown    alendronate (FOSAMAX) 35 MG tablet Take 35 mg by mouth every 7 days Takes on Sunday morning 10/15/2023 at am    aspirin (ASA) 81 MG EC tablet Take 81 mg by mouth daily 10/15/2023 at 324 mg    lisinopril (ZESTRIL) 2.5 MG tablet Take 1 tablet (2.5 mg) by mouth daily 10/15/2023 at am    magnesium 250 MG tablet Take 1 tablet by mouth daily 10/15/2023 at 1120    metoprolol succinate ER (TOPROL XL) 25 MG 24 hr tablet Take 1 tablet (25 mg) by mouth At Bedtime 10/14/2023 at hs    Multiple vitamin TABS Take 1 tablet by mouth daily 10/15/2023 at 1120    simvastatin (ZOCOR) 40 MG tablet Take 1 tablet (40 mg) by mouth At Bedtime 10/14/2023 at hs

## 2023-10-16 NOTE — PROGRESS NOTES
[facility]    Medicine Progress Note - Hospitalist Service    Date of Admission:  10/15/2023    Assessment & Plan   Robin Cazares is a 68 year old female with past medical history significant for prior MI admitted on 10/15/2023 with NSTEMI.      NSTEMI  Hx of CAD s/p IWMI 2008 with revascularization of dOM   Hx of RVOT VT (exercise induced)   Pt presented to the ED with sudden onset chest discomfort while out for a walk. She has a hx of CAD with a MI in 2008. She has had no issues since that time.   * EKG in the ED was without acute ischemic changes.   *trop 10--169--366--349, consistent with NSTEMI  *10/16 cardiac cath: minimal RCA disease. Left main, LAD and circ normal. LV gram near normal EF 50% with focal mid-inferolateral akinesis  - Continuous cardiac monitoring  - Continue PTA ASA  - simvastatin changed to rosuvastatin  - Continue PTA lisinopril and metoprolol  - Nitroglycerin PRN for recurrent chest pain   - EKG PRN for recurrent chest pain   - cardiology following, may consider cardiac MRI    Prediabetes  Technically meets criteria for prediabetes with A1C of 5.8  - follow up with PCP        Diet: NPO for Medical/Clinical Reasons Except for: Meds, Ice Chips    DVT Prophylaxis: Pneumatic Compression Devices  Jara Catheter: Not present  Lines: None     Cardiac Monitoring: ACTIVE order. Indication: AMI (NSTEMI/ STEMI) (48 hours)  Code Status: Full Code      Clinically Significant Risk Factors Present on Admission                # Drug Induced Platelet Defect: home medication list includes an antiplatelet medication   # Hypertension: Home medication list includes antihypertensive(s)                 Disposition Plan      Expected Discharge Date: 10/17/2023                    Rachana Dowling DO  Hospitalist Service  [facility]  Securely message with [employer] (more info)  Text page via [place] Paging/Directory    ______________________________________________________________________    Interval History   Patient seen and examined. Had some intermittent chest discomfort in hospital 1-2/10 for severity of pain, lasting quite briefly. Otherwise at rest seems comfortable. No shortness of breath, nausea, diaphoresis. She had been NPO for cardiac cath.    Physical Exam   Vital Signs: Temp: 97.1  F (36.2  C) Temp src: Temporal BP: 108/60 Pulse: 57   Resp: 12 SpO2: 97 % O2 Device: None (Room air)    Weight: 123 lbs 0 oz    Constitutional: Awake, alert, cooperative, no apparent distress  Respiratory: Clear to auscultation bilaterally, no crackles or wheezing  Cardiovascular: Regular rate and rhythm, normal S1 and S2, and no murmur noted  GI: Normal bowel sounds, soft, non-distended, non-tender  Skin/Integumen: No rashes, no cyanosis, no edema  Other:      Medical Decision Making       35 MINUTES SPENT BY ME on the date of service doing chart review, history, exam, documentation & further activities per the note.      Data     I have personally reviewed the following data over the past 24 hrs:    5.3  \   12.4   / 204     142 106 14.8 /  97   3.7 25 0.79 \     ALT: 26 AST: 43 AP: 62 TBILI: 0.3   ALB: 4.5 TOT PROTEIN: 7.3 LIPASE: N/A     Trop: 349 (HH) BNP: N/A     TSH: N/A T4: N/A A1C: 5.8 (H)       Imaging results reviewed over the past 24 hrs:   Recent Results (from the past 24 hour(s))   Chest XR,  PA & LAT    Narrative    EXAM: XR CHEST 2 VIEWS  LOCATION: [facility]  DATE: 10/15/2023    INDICATION: chest pain  COMPARISON: 8/15/2008      Impression    IMPRESSION: Negative chest.   Cardiac Catheterization    Narrative     Minimal RCA disease. Left main, LAD and Circumflex are angiographically   normal  LV gram: near normal LV function (EF ~50%), focal mid-inferolateral   akinesis could be secondary to prior MI

## 2023-10-16 NOTE — ED NOTES
[facility]  ED Nurse Handoff Report    ED Chief complaint: Chest Pain      ED Diagnosis:   Final diagnoses:   NSTEMI (non-ST elevated myocardial infarction) (H)   Chest pain, unspecified type       Code Status: Full Code    Allergies:   Allergies   Allergen Reactions    Fosamax [Alendronate] Palpitations     Occurred when pt was on 70mg weekly.  Reattempting to take medication       Patient Story: Patient presents with chest pain radiating to left arm that started today around 1600.  She was outside on a 4 mile walk when the pain started.  She reports hx of MI in 2008.  Focused Assessment:  Left chest pain radiating to left arm.  Initial troponin WNL.  Delta troponin increased to 169.  Other labs unremarkable.    Treatments and/or interventions provided: 324 mg Aspirin, IV Heparin  Patient's response to treatments and/or interventions: Tolerated interventions well.    To be done/followed up on inpatient unit:  Heparin monitoring, Cardiology consult.    Does this patient have any cognitive concerns?:  none    Activity level - Baseline/Home:  Independent  Activity Level - Current:   Independent    Patient's Preferred language: English   Needed?: No    Isolation: None  Infection: Not Applicable  Patient tested for COVID 19 prior to admission: NO  Bariatric?: No    Vital Signs:   Vitals:    10/15/23 1800 10/15/23 1830 10/15/23 1900 10/15/23 2000   BP: 135/85 (!) 144/82 139/78 (!) 143/82   Pulse: 60 59 59 62   Resp: 14 15     Temp:       TempSrc:       SpO2:       Weight:       Height:           Cardiac Rhythm:     Was the PSS-3 completed:   Yes  What interventions are required if any?               Family Comments: none  OBS brochure/video discussed/provided to patient/family: N/A              Name of person given brochure if not patient: na              Relationship to patient: na    For the majority of the shift this patient's behavior was Green.   Behavioral interventions performed were  none.    ED NURSE PHONE NUMBER: [phone]

## 2023-10-16 NOTE — CARE PLAN
Care plan note:      Recent Vitals:  Temp: 97.1  F (36.2  C) Temp src: Temporal BP: 108/60 Pulse: 57   Resp: 12 SpO2: 97 % O2 Device: None (Room air)      Orientation/Neuro: Alert and Oriented x 4  Pain: Denies Chest pain   Tele: Sinus rhythm    IV medications: Heparin   Mobility: up Independently   Skin: With in normal limits   GI: WDL and no complaints  : WDL     Diet: Tolerating diet:   NPO since midnight pending angio  Orders Placed This Encounter      NPO for Medical/Clinical Reasons Except for: Meds, Ice Chips      Safety/Concerns:  None  Aggression Color: Green    Plan: Cardiology consult. Anticipate cath lab today.   Continue to monitor.      Ofelia Cannon RN

## 2023-10-16 NOTE — PROGRESS NOTES
RECEIVING UNIT ED HANDOFF REVIEW    ED Nurse Handoff Report was reviewed by: Ofelia Cannon RN on October 16, 2023 at 1:38 AM

## 2023-10-16 NOTE — PLAN OF CARE
Goal Outcome Evaluation:    VSS. Monitor remains Sinus rhythm. Heparin drip at 650 unit(s)/hr. Pt. Sent to cath lab for angiogram.

## 2023-10-16 NOTE — H&P
[facility]    History and Physical - Hospitalist Service       Date of Admission:  10/15/2023    Assessment & Plan      Robin Cazares is a 68 year old female with past medical history significant for prior MI admitted on 10/15/2023 with NSTEMI.     NSTEMI  Hx of CAD s/p IWMI 2008 with revascularization of dOM   Hx of RVOT VT (exercise induced)   Pt presented to the ED with sudden onset chest discomfort while out for a walk. She has a hx of CAD with a MI in 2008. She has had no issues since that time.   * EKG in the ED was without acute ischemic changes.   * Initial troponin in the ED was within normal limits at 10, but repeat troponin now elevated to 169 consistent with NSTEMI  * Started on Heparin   * pt currently chest pain free on my examination  - Admit to [facility]  - Continue heparin  - Continuous cardiac monitoring  - Continue PTA ASA and statin   - Continue PTA lisinopril and metoprolol  - Nitroglycerin PRN for recurrent chest pain   - EKG PRN for recurrent chest pain   - TTE ordered for AM   - Lipid panel, A1C  - Cardiology consultation       Diet:  Regular diet, NPO at midnight   DVT Prophylaxis: heparin gtt   Jara Catheter: Not present  Lines: None     Cardiac Monitoring: None  Code Status: Full Code     Clinically Significant Risk Factors Present on Admission                # Drug Induced Platelet Defect: home medication list includes an antiplatelet medication   # Hypertension: Home medication list includes antihypertensive(s)                 Disposition Plan           Sabrina Denise MD  Hospitalist Service  [facility]  Securely message with [email or web address] (more info)  Text page via [employer] Paging/Directory     ______________________________________________________________________    Chief Complaint   Chest pain    History is obtained from the patient    History of Present Illness   Robin Cazares is a 68 year old female who presents to the ED with  "sudden onset chest discomfort while out for a walk. She normally walks 3-4 miles daily without any issue. Today while nearing the end of her walk she started experiencing an abnormal sensation in her chest which she described as a \"cold\" sensation. This then progressed more to a pain in the chest with some associated discomfort in her left arm. She was able to walk home, but did have to take a few breaks. When the pain did not subside she decided to present to the ED. She states her current symptoms are not similar to her symptoms she had in 2008 when she had her prior MI. She also has a hx of exercise induced VT that was diagnosed during a stress test. She was fairly asymptomatic when this happened so does not think her current symptoms are related to this.       Past Medical History    Past Medical History:   Diagnosis Date    CAD (coronary artery disease)     8/2008 angioplasty occluded distal OM branch    Hyperlipidemia     Internal hemorrhoids     MI (myocardial infarction) (H)     2008    Osteopenia     Osteoporosis     Palpitations     S/P breast lumpectomy     Vaginal bleeding        Past Surgical History   Past Surgical History:   Procedure Laterality Date    HEART CATH, ANGIOPLASTY      8/2008 angioplasty occluded distal OM branch    OPEN REDUCTION INTERNAL FIXATION FEMUR PROXIMAL Right 6/2/2018    Procedure: OPEN REDUCTION INTERNAL FIXATION FEMUR PROXIMAL;  OPEN REDUCTION INTERNAL FIXATION RIGHT FEMUR FRACTURE;  Surgeon: Perico Zapata MD;  Location:  OR       Prior to Admission Medications   Prior to Admission Medications   Prescriptions Last Dose Informant Patient Reported? Taking?   Denosumab (PROLIA SC)  Self Yes No   Sig: Inject Subcutaneous every 6 months As directed    Multiple vitamin TABS  Self Yes No   Sig: Take by mouth daily   aspirin (ASA) 81 MG EC tablet   Yes No   Sig: Take 81 mg by mouth daily   calcium carbonate-vitamin D (OSCAL W/D) 500-200 MG-UNIT tablet  Self Yes No   Sig: " Take 1 tablet by mouth every other day   lisinopril (ZESTRIL) 2.5 MG tablet   No No   Sig: Take 1 tablet (2.5 mg) by mouth daily   metoprolol succinate ER (TOPROL XL) 25 MG 24 hr tablet   No No   Sig: Take 1 tablet (25 mg) by mouth At Bedtime   oxyCODONE-acetaminophen (PERCOCET) 5-325 MG per tablet   No No   Sig: Take 1-2 tablets by mouth every 4 hours as needed for severe pain   Patient not taking: Reported on 12/1/2022   simvastatin (ZOCOR) 40 MG tablet   No No   Sig: Take 1 tablet (40 mg) by mouth At Bedtime      Facility-Administered Medications: None        Review of Systems    The 10 point Review of Systems is negative other than noted in the HPI or here.     Physical Exam   Vital Signs: Temp: 97.1  F (36.2  C) Temp src: Temporal BP: (!) 144/82 Pulse: 59   Resp: 15 SpO2: 97 % O2 Device: None (Room air)    Weight: 122 lbs 0 oz  Constitutional: Awake, alert, cooperative, no apparent distress.  Eyes: Conjunctiva and pupils examined and normal.  HEENT: Moist mucous membranes, normal dentition.  Respiratory: Clear to auscultation bilaterally, no crackles or wheezing.  Cardiovascular: Regular rate and rhythm, normal S1 and S2, and no murmur noted.  GI: Soft, non-distended, non-tender, normal bowel sounds.  Skin: No rashes, no cyanosis, no edema.  Musculoskeletal: No joint swelling, erythema or tenderness.  Neurologic: Cranial nerves 2-12 intact, normal strength and sensation.  Psychiatric: Alert, oriented to person, place and time, no obvious anxiety or depression.      Medical Decision Making       80 MINUTES SPENT BY ME on the date of service doing chart review, history, exam, documentation & further activities per the note.      Data     I have personally reviewed the following data over the past 24 hrs:    6.6  \   13.2   / 237     144 105 20.0 /  141 (H)   3.9 23 0.83 \     ALT: 26 AST: 43 AP: 62 TBILI: 0.3   ALB: 4.5 TOT PROTEIN: 7.3 LIPASE: N/A     Trop: 169 (HH) BNP: N/A       Imaging results reviewed over  the past 24 hrs:   Recent Results (from the past 24 hour(s))   Chest XR,  PA & LAT    Narrative    EXAM: XR CHEST 2 VIEWS  LOCATION: [facility]  DATE: 10/15/2023    INDICATION: chest pain  COMPARISON: 8/15/2008      Impression    IMPRESSION: Negative chest.

## 2023-10-17 ENCOUNTER — APPOINTMENT (OUTPATIENT)
Dept: CARDIOLOGY | Facility: CLINIC | Age: 68
DRG: 282 | End: 2023-10-17
Attending: INTERNAL MEDICINE
Payer: MEDICARE

## 2023-10-17 ENCOUNTER — APPOINTMENT (OUTPATIENT)
Dept: CARDIOLOGY | Facility: CLINIC | Age: 68
DRG: 282 | End: 2023-10-17
Attending: STUDENT IN AN ORGANIZED HEALTH CARE EDUCATION/TRAINING PROGRAM
Payer: MEDICARE

## 2023-10-17 LAB
ANION GAP SERPL CALCULATED.3IONS-SCNC: 9 MMOL/L (ref 7–15)
BUN SERPL-MCNC: 11.3 MG/DL (ref 8–23)
CALCIUM SERPL-MCNC: 9.2 MG/DL (ref 8.8–10.2)
CHLORIDE SERPL-SCNC: 105 MMOL/L (ref 98–107)
CREAT SERPL-MCNC: 0.71 MG/DL (ref 0.51–0.95)
CRP SERPL-MCNC: <3 MG/L
DEPRECATED HCO3 PLAS-SCNC: 24 MMOL/L (ref 22–29)
EGFRCR SERPLBLD CKD-EPI 2021: >90 ML/MIN/1.73M2
ERYTHROCYTE [DISTWIDTH] IN BLOOD BY AUTOMATED COUNT: 13.3 % (ref 10–15)
ERYTHROCYTE [SEDIMENTATION RATE] IN BLOOD BY WESTERGREN METHOD: 2 MM/HR (ref 0–30)
GLUCOSE SERPL-MCNC: 106 MG/DL (ref 70–99)
HCT VFR BLD AUTO: 38.3 % (ref 35–47)
HGB BLD-MCNC: 12.4 G/DL (ref 11.7–15.7)
LVEF ECHO: NORMAL
MCH RBC QN AUTO: 29.3 PG (ref 26.5–33)
MCHC RBC AUTO-ENTMCNC: 32.4 G/DL (ref 31.5–36.5)
MCV RBC AUTO: 91 FL (ref 78–100)
PLATELET # BLD AUTO: 205 10E3/UL (ref 150–450)
POTASSIUM SERPL-SCNC: 4.1 MMOL/L (ref 3.4–5.3)
RBC # BLD AUTO: 4.23 10E6/UL (ref 3.8–5.2)
SODIUM SERPL-SCNC: 138 MMOL/L (ref 135–145)
TROPONIN T SERPL HS-MCNC: 264 NG/L
WBC # BLD AUTO: 5.5 10E3/UL (ref 4–11)

## 2023-10-17 PROCEDURE — 85014 HEMATOCRIT: CPT | Performed by: HOSPITALIST

## 2023-10-17 PROCEDURE — 210N000001 HC R&B IMCU HEART CARE

## 2023-10-17 PROCEDURE — 250N000013 HC RX MED GY IP 250 OP 250 PS 637: Performed by: INTERNAL MEDICINE

## 2023-10-17 PROCEDURE — 250N000013 HC RX MED GY IP 250 OP 250 PS 637: Performed by: STUDENT IN AN ORGANIZED HEALTH CARE EDUCATION/TRAINING PROGRAM

## 2023-10-17 PROCEDURE — 99232 SBSQ HOSP IP/OBS MODERATE 35: CPT | Performed by: INTERNAL MEDICINE

## 2023-10-17 PROCEDURE — 93306 TTE W/DOPPLER COMPLETE: CPT

## 2023-10-17 PROCEDURE — 99232 SBSQ HOSP IP/OBS MODERATE 35: CPT | Performed by: HOSPITALIST

## 2023-10-17 PROCEDURE — 250N000013 HC RX MED GY IP 250 OP 250 PS 637: Performed by: HOSPITALIST

## 2023-10-17 PROCEDURE — 86140 C-REACTIVE PROTEIN: CPT | Performed by: INTERNAL MEDICINE

## 2023-10-17 PROCEDURE — 75561 CARDIAC MRI FOR MORPH W/DYE: CPT | Mod: MG

## 2023-10-17 PROCEDURE — 93306 TTE W/DOPPLER COMPLETE: CPT | Mod: 26 | Performed by: INTERNAL MEDICINE

## 2023-10-17 PROCEDURE — 84484 ASSAY OF TROPONIN QUANT: CPT | Performed by: INTERNAL MEDICINE

## 2023-10-17 PROCEDURE — 85652 RBC SED RATE AUTOMATED: CPT | Performed by: INTERNAL MEDICINE

## 2023-10-17 PROCEDURE — 75561 CARDIAC MRI FOR MORPH W/DYE: CPT | Mod: 26 | Performed by: INTERNAL MEDICINE

## 2023-10-17 PROCEDURE — 255N000002 HC RX 255 OP 636: Performed by: STUDENT IN AN ORGANIZED HEALTH CARE EDUCATION/TRAINING PROGRAM

## 2023-10-17 PROCEDURE — A9585 GADOBUTROL INJECTION: HCPCS | Performed by: STUDENT IN AN ORGANIZED HEALTH CARE EDUCATION/TRAINING PROGRAM

## 2023-10-17 PROCEDURE — G1010 CDSM STANSON: HCPCS | Performed by: INTERNAL MEDICINE

## 2023-10-17 PROCEDURE — 80048 BASIC METABOLIC PNL TOTAL CA: CPT | Performed by: HOSPITALIST

## 2023-10-17 PROCEDURE — 36415 COLL VENOUS BLD VENIPUNCTURE: CPT | Performed by: HOSPITALIST

## 2023-10-17 RX ORDER — GADOBUTROL 604.72 MG/ML
7.5 INJECTION INTRAVENOUS ONCE
Status: COMPLETED | OUTPATIENT
Start: 2023-10-17 | End: 2023-10-17

## 2023-10-17 RX ORDER — NALOXONE HYDROCHLORIDE 0.4 MG/ML
0.4 INJECTION, SOLUTION INTRAMUSCULAR; INTRAVENOUS; SUBCUTANEOUS
Status: DISCONTINUED | OUTPATIENT
Start: 2023-10-17 | End: 2023-10-18 | Stop reason: HOSPADM

## 2023-10-17 RX ORDER — NALOXONE HYDROCHLORIDE 0.4 MG/ML
0.2 INJECTION, SOLUTION INTRAMUSCULAR; INTRAVENOUS; SUBCUTANEOUS
Status: DISCONTINUED | OUTPATIENT
Start: 2023-10-17 | End: 2023-10-18 | Stop reason: HOSPADM

## 2023-10-17 RX ORDER — GADOBUTROL 604.72 MG/ML
7.5 INJECTION INTRAVENOUS ONCE
Status: CANCELLED | OUTPATIENT
Start: 2023-10-17 | End: 2023-10-17

## 2023-10-17 RX ORDER — AMLODIPINE BESYLATE 5 MG/1
5 TABLET ORAL DAILY
Status: DISCONTINUED | OUTPATIENT
Start: 2023-10-17 | End: 2023-10-18 | Stop reason: HOSPADM

## 2023-10-17 RX ADMIN — AMLODIPINE BESYLATE 5 MG: 5 TABLET ORAL at 14:21

## 2023-10-17 RX ADMIN — METOPROLOL SUCCINATE 25 MG: 25 TABLET, EXTENDED RELEASE ORAL at 21:26

## 2023-10-17 RX ADMIN — GADOBUTROL 7.5 ML: 604.72 INJECTION INTRAVENOUS at 11:05

## 2023-10-17 RX ADMIN — ASPIRIN 81 MG: 81 TABLET, COATED ORAL at 08:19

## 2023-10-17 RX ADMIN — ROSUVASTATIN CALCIUM 40 MG: 20 TABLET, FILM COATED ORAL at 21:27

## 2023-10-17 RX ADMIN — LISINOPRIL 2.5 MG: 2.5 TABLET ORAL at 08:19

## 2023-10-17 ASSESSMENT — ACTIVITIES OF DAILY LIVING (ADL)
ADLS_ACUITY_SCORE: 20

## 2023-10-17 NOTE — PLAN OF CARE
Pt alert and oriented x4. Pt c/o mild chest pressure while at rest. Pt states chest pressure better with walking. Pt denies shortness of breath. Pt denies nausea. Pt had cardiac mri and echo today. Tolerating reg diet, voiding adequate amts urine.

## 2023-10-17 NOTE — PROGRESS NOTES
[facility]    Medicine Progress Note - Hospitalist Service    Date of Admission:  10/15/2023    Assessment & Plan   Robin Cazares is a 68 year old female with past medical history significant for prior MI admitted on 10/15/2023 with NSTEMI.      NSTEMI  Recurrent chest pain, ?coronary vasospasm.  Hx of CAD s/p IWMI 2008 with revascularization of dOM   Hx of RVOT VT (exercise induced)   Pt presented to the ED with sudden onset chest discomfort while out for a walk. She has a hx of CAD with a MI in 2008. She has had no issues since that time.   * EKG in the ED was without acute ischemic changes.   *trop 10--169--366--349, consistent with NSTEMI. Recurrent trop elevation after chest pain 10/16 evening. Inflammatory markers negative.  *10/16 cardiac cath: minimal RCA disease. Left main, LAD and circ normal. LV gram near normal EF 50% with focal mid-inferolateral akinesis  *10/17 echo: LVSF low normal, EF 50-55%. Severe hypokinesis of basal and mid septum. Inferior wall not well seen, but mid segment is hypokinetic.   *10/17 cardiac MRI: normal systolic function. Mid inferolateral akinesis. LVEF 58%, RVEF 57%. Nontransmural scar in mid inferolateral segments consistent with scar in RCA/circumflex distribution. Small, non CAD, mid myocardial focal scars in distal anterior portion of LV and distal inferolateral region. ?scar related to prior myocarditis. Liver lesions noted.  - Continue PTA ASA  - simvastatin changed to rosuvastatin  - Continue PTA metoprolol  - discontinue PTA lisinopril, start amlodipine 5mg daily  - patient has severe headaches with nitroglycerin  - EKG PRN for recurrent chest pain   - cardiology following  - Monitor overnight for recurrent chest pain after initiation of amlodipine    Prediabetes  Technically meets criteria for prediabetes with A1C of 5.8  - follow up with PCP, repeat A1C in 3mo    Liver lesions  Noted on cardiac MRI. LFTs WNL on 10/15  - await radiology  over-read. May consider outpatient MRI liver.        Diet: Low Saturated Fat Na <2400 mg    DVT Prophylaxis: Pneumatic Compression Devices  Jara Catheter: Not present  Lines: None     Cardiac Monitoring: ACTIVE order. Indication: Post- PCI/Angiogram (24 hours)  Code Status: Full Code      Clinically Significant Risk Factors                                        Disposition Plan      Expected Discharge Date: 10/18/2023                    Rachana Dowling DO  Hospitalist Service  [facility]  Securely message with [employer]) (more info)  Text page via [email or web address] Paging/Directory   ______________________________________________________________________    Interval History   Patient seen and examined. Again had chest pain 10/16 evening with elevated troponin in response. Negative EKG. Inflammatory markers normal. Had some mild chest pain during late morning/midday that resolved on own as well. Discussed plan to ambulate throughout afternoon and see if chest pain recurs after initiating amlodipine. Discussed with cardiology, Dr Ritter and RN    Physical Exam   Vital Signs: Temp: 98.4  F (36.9  C) Temp src: Oral BP: 137/77 Pulse: 63   Resp: 16 SpO2: 98 % O2 Device: None (Room air)    Weight: 122 lbs 5.68 oz    Constitutional: Awake, alert, cooperative, no apparent distress  Respiratory: Clear to auscultation bilaterally, no crackles or wheezing  Cardiovascular: Regular rate and rhythm, normal S1 and S2, and no murmur noted  GI: Normal bowel sounds, soft, non-distended, non-tender  Skin/Integumen: No rashes, no cyanosis, no edema  Other:      Medical Decision Making       35 MINUTES SPENT BY ME on the date of service doing chart review, history, exam, documentation & further activities per the note.      Data     I have personally reviewed the following data over the past 24 hrs:    5.5  \   12.4   / 205     138 105 11.3 /  106 (H)   4.1 24 0.71 \     Trop: 264 (HH) BNP: N/A     Procal: N/A CRP: <3.00 Lactic  Acid: N/A         Imaging results reviewed over the past 24 hrs:   Recent Results (from the past 24 hour(s))   Cardiac Catheterization    Narrative     Minimal RCA disease. Left main, LAD and Circumflex are angiographically   normal  LV gram: near normal LV function (EF ~50%), focal mid-inferolateral   akinesis could be secondary to prior MI     Echocardiogram Complete   Result Value    LVEF  50-55%    Narrative    [id number]  [id number]  [id number]  2010^ANITRA^ELYSSA^A     [facility]  [address]  [place] [address]  Phone [phone]  Fax [phone]     Name: RAS CASTILLO  MRN: [id number]  : 1955  Study Date: 10/17/2023 08:20 AM  Age: 68 yrs  Gender: Female  Patient Location: [facility]  Reason For Study: Syncope  Ordering Physician: ELYSSA AYOUB  Performed By: Marnie Stubbs     BSA: 1.6 m2  Height: 67 in  Weight: 122 lb  HR: 57  BP: 136/77 mmHg  ______________________________________________________________________________  Procedure  Complete Echo Adult.  ______________________________________________________________________________  Interpretation Summary     Left ventricular systolic function is low normal. The visual ejection fraction  is 50-55%.  There is severe hypokinesis of basal and mid septum. Inferior wall not well  seen but mid segment appears hypokinetic on limited views. Contrast was not  used.  No hemodynamically significant valvular disease.  Septal wall motion was normal on resting part of the stress test in .  ______________________________________________________________________________  Left Ventricle  The left ventricle is normal in size. There is normal left ventricular wall  thickness. Left ventricular systolic function is low normal. The visual  ejection fraction is 50-55%. Grade I or early diastolic dysfunction. Inferior  wall not well seen but mid segment  appears hypokinetic on limited views.  Contrast was not used.     Right Ventricle  The right ventricle is normal in size and function.     Atria  Normal left atrial size. Right atrial size is normal.     Mitral Valve  The mitral valve leaflets appear normal. There is no evidence of stenosis,  fluttering, or prolapse. There is trace mitral regurgitation.     Tricuspid Valve  Normal tricuspid valve. There is trace tricuspid regurgitation. Right  ventricular systolic pressure could not be approximated due to inadequate  tricuspid regurgitation.     Aortic Valve  There is mild trileaflet aortic sclerosis. There is trace aortic  regurgitation. No aortic stenosis is present. The mean AoV pressure gradient  is 3.0 mmHg.     Pulmonic Valve  The pulmonic valve is not well visualized.     Vessels  The aortic root is normal size. Normal size ascending aorta. IVC diameter <2.1  cm collapsing >50% with sniff suggests a normal RA pressure of 3 mmHg.     Pericardium  There is no pericardial effusion.     Rhythm  Sinus rhythm was noted.     ______________________________________________________________________________  MMode/2D Measurements & Calculations  IVSd: 0.79 cm  LVIDd: 4.4 cm  LVIDs: 2.7 cm  LVPWd: 0.74 cm  FS: 38.5 %  LV mass(C)d: 103.5 grams  LV mass(C)dI: 63.2 grams/m2     Ao root diam: 2.9 cm  asc Aorta Diam: 2.8 cm  LVOT diam: 1.8 cm  LVOT area: 2.5 cm2  Ao root diam index Ht(cm/m): 1.7  Ao root diam index BSA (cm/m2): 1.8  Asc Ao diam index BSA (cm/m2): 1.7  Asc Ao diam index Ht(cm/m): 1.6  LA Volume (BP): 17.7 ml  LA Volume Index (BP): 10.8 ml/m2  RWT: 0.33     TAPSE: 3.5 cm     Doppler Measurements & Calculations  MV E max adal: 68.1 cm/sec  MV A max adal: 92.2 cm/sec  MV E/A: 0.74  MV dec time: 0.28 sec  Ao V2 max: 110.0 cm/sec  Ao max P.0 mmHg  Ao V2 mean: 73.3 cm/sec  Ao mean PG: 3.0 mmHg  Ao V2 VTI: 25.2 cm  TEREZA(I,D): 2.2 cm2  TEREZA(V,D): 2.1 cm2  LV V1 max PG: 3.3 mmHg  LV V1 max: 91.2 cm/sec  LV V1 VTI: 22.2  cm  SV(LVOT): 56.5 ml  SI(LVOT): 34.5 ml/m2  PA V2 max: 87.1 cm/sec  PA max PG: 3.0 mmHg  PA acc time: 0.11 sec  AV Jaswant Ratio (DI): 0.83  TEREZA Index (cm2/m2): 1.4  E/E' av.3  Lateral E/e': 6.0  Medial E/e': 8.6  RV S Jaswant: 11.2 cm/sec     ______________________________________________________________________________  Report approved by: Edmond Kitchen 10/17/2023 11:21 AM         MR Cardiac w contrast    Narrative                                                     [facility]                                                     CMR Report      MRN:                  [id number]                                  Name:        RAS CASTILLO                                  :                                                   Scan Date:           2023-Oct-17                                  Electronically signed by Dayday Traylor 2023-Oct-17 14:38:12    VITALS   ==========================================================================================================    HEIGHT: 67.0 in    (170.2 cm)  WEIGHT: 122.0 lbs    (55.3 kgs)  BSA: 1.64 m^2    FINAL IMPRESSION   ==========================================================================================================    Normal biventricular size with normal systolic function.  There is mid inferolateral akinesis. Coronary  LVEF is 58%.  Coronary RVEF is 57%.  Delayed hyperenhancement reveals nontransmural scar in the mid inferolateral segments consistent with scar  in the RCA/circumflex distribution.  There is also presence of small, non-CAD, mid myocardial focal scars in the distal anterior portion of the  left ventricle as well as distal inferolateral region. This raises possibility of scar related to prior  myocarditis.  No evidence of pericardial hyperenhancement.  Liver lesions noted. Consider dedicated imaging of liver if clinically appropriate.     SUMMARY    ==========================================================================================================    LEFT VENTRICLE: Quantitative LVEF 58.2 %. LV wall thickness is normal. LV cavity size is normal. LV systolic function is  regionally impaired. Mid inferolateral akinesis.    VIABILITY: Delayed hyperenhancement reveals's nontransmural scar in the mid inferolateral segments consistent with  scar in the RCA/circumflex distribution.  There is also an nonischemic, mid myocardial scar in the distal anterior portion of the left ventricle as  well as distal inferolateral region.  This could represent scar related to myocarditis. LV scar size is 4  %.    RIGHT VENTRICLE: Quantitative RVEF 57.2 %. RV cavity size is normal. RV systolic function is normal.    LEFT ATRIUM: LA cavity size is normal.    RIGHT ATRIUM: RA cavity size is upper limits of normal.    PERICARDIUM: There is no pericardial effusion. No evidence of pericardial hyperenhancement.    AORTIC VALVE: Aortic valve is trileaflet.    AORTIC ROOT: The aortic root is normal in size.    OTHER FINDINGS: T2 based sequences and mapping does not reveal evidence of increased myocardial fluid content.      CORE EXAM   ==========================================================================================================    MEASUREMENTS   ----------------------------------------------------------------------------------------      VOLUMETRIC ANALYSIS       ----------------------------------------------  .---------------------------------------------------------.                   LV     Reference  RV     Reference   +-----+-----------+-------+-----------+-------+-----------+   EDV  ml         100.1   ()  107.2   ()         ml/m^2      61.0   (53-87)    65.4   (49-86)     ESV  ml          41.8   (20-57)    45.9   (11-63)          ml/m^2      25.5   (13-31)    28.0   (8-34)      CO   L/min       3.61               3.80                    L/min/m^2   2.20              2.32               SV   ml          58.2   ()   61.3   ()         ml/m^2      35.5   (36-60)    37.4   (34-58)     EF   %           58.2   (60-78)    57.2   (57-81)    '-----+-----------+-------+-----------+-------+-----------'            CARDIAC OUTPUT HR:  62 bpm      LV DIMENSIONS       ----------------------------------------------          WALL THICKNESS - ANTEROSEPTAL:  0.8 cm          WALL THICKNESS - INFEROLATERAL:  0.8 cm          LV MENDY:  4.2 cm        LA DIMENSIONS (LV SYSTOLE)       ----------------------------------------------          DIAMETER:  2.2 cm        AORTIC ROOT DIMENSIONS       ----------------------------------------------          ANNULUS:  2.8 cm      17 SEGMENT   ----------------------------------------------------------------------------------------  .-----------------------------------------------------------------------------------------.   Segments            Wall Motion  Hyperenhancement  Stress Perfusion  Interpretation   +--------------------+-------------+------------------+------------------+----------------+   Base Anterior                                                                          Base Anteroseptal                                                                      Base Inferoseptal                                                                      Base Inferior                                                                          Base Inferolateral                                                                     Base Anterolateral                                                                     Mid Anterior                                                                           Mid Anteroseptal                                                                       Mid Inferoseptal                                                                        Mid Inferior                                                                           Mid Inferolateral                26-50%                                                Mid Anterolateral                                                                      Apical Anterior                  1-25%                                                 Apical Septal                                                                          Apical Inferior                                                                        Apical Lateral                   1-25%                                                 [place]                                                                                  +--------------------+-------------+------------------+------------------+----------------+   RV Segments         Wall Motion  Hyperenhancement                    Interpretation   +--------------------+-------------+------------------+------------------+----------------+   RV Basal Anterior                                                                      RV Basal Inferior                                                                      RV Mid                                                                                 RV Apical                                                                             '--------------------+-------------+------------------+------------------+----------------'        FINDINGS       ----------------------------------------------          LV SCAR SIZE (17 SEGMENT):  4 %      SCAN INFO   ==========================================================================================================    GENERAL   ----------------------------------------------------------------------------------------      SCANNER       ----------------------------------------------           :  SIEMENS          MODEL:  Aera          PULSE SEQUENCES:  SSFP cine, 2D LGE segmented, 2D LGE single-shot, T2-weighted imaging         CONTRAST AGENT       ----------------------------------------------          TYPE:  Gadavist          GD CONCENTRATION:  0.5 M          VOLUME ADMINISTERED:  7.5 ml          DOSAGE:  0.07 mmol/kg        SETUP       ----------------------------------------------          REASON(S) FOR SCAN:  Post-Acute MI           ATTENDING PHYSICIAN:  MARC BARRIOS   ==========================================================================================================    CPT Codes  ICD10 Codes      Report generated by Paula, a product of [employer]

## 2023-10-17 NOTE — PROGRESS NOTES
"       Assessment and Plan:     Acute myocardial infarction with normal coronary arteries.  Minimal plaque within the proximal right coronary artery noted.  Etiology unclear.  No definitive evidence for scad, most likely coronary vasospasm.    Recurrent chest pain episode while inpatient with new increase in troponin level, indicating new myocardial injury. Episode resolved spontaneously without treatment.  Inflammatory markers negative.  Most likely explanation seems to be coronary vasospasm.  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  Mild dyslipidemia    Recommendations:    Cardiac MRI today    Empiric calcium channel blocker, amlodipine 5 mg daily, for prevention of coronary vasospasm.  Patient is intolerant to nitrates which caused severe headaches.    Continue low-dose aspirin 81 mg daily, rosuvastatin, metoprolol.  We will discontinue lisinopril.    Observe overnight for recurrent chest pain episodes and consider discharge tomorrow.    WILTON DICKEY MD        Interval History:     Episode of recurrent central retrosternal chest pressure last night, mild, self-limited, but with increasing troponin from 177 to 264.  No further chest pain symptoms this morning          Medications:      amLODIPine  5 mg Oral Daily    aspirin  81 mg Oral Daily    metoprolol succinate ER  25 mg Oral At Bedtime    rosuvastatin  40 mg Oral At Bedtime            Physical Exam:   Blood pressure 137/77, pulse 63, temperature 98.4  F (36.9  C), temperature source Oral, resp. rate 16, height 1.702 m (5' 7\"), weight 55.5 kg (122 lb 5.7 oz), SpO2 98%, not currently breastfeeding.  Vitals:    10/15/23 1653 10/16/23 0300 10/17/23 0500   Weight: 55.3 kg (122 lb) 55.8 kg (123 lb) 55.5 kg (122 lb 5.7 oz)         Intake/Output Summary (Last 24 hours) at 10/17/2023 1223  Last data filed at 10/16/2023 2330  Gross per 24 hour   Intake 720 ml "   Output --   Net 720 ml       10/12 0700 - 10/17 0659  In: 720 [P.O.:720]  Out: -   Net: 720    Exam:  GENERAL APPEARANCE ADULT: Alert, in no acute distress  RESP: lungs clear to auscultation   CV: regular rate and rhythm, no murmur, rub, or gallop  ABDOMEN: normal bowel sounds, soft, nontender, no hepatosplenomegaly or other masses  EXTREMITIES: No edema         Data:   LABS (Last four results)  CMP  Recent Labs   Lab 10/17/23  0626 10/16/23  0554 10/15/23  1702    142 144   POTASSIUM 4.1 3.7 3.9   CHLORIDE 105 106 105   CO2 24 25 23   ANIONGAP 9 11 16*   * 97 141*   BUN 11.3 14.8 20.0   CR 0.71 0.79 0.83   GFRESTIMATED >90 81 76   HEIDI 9.2 8.7* 9.2   PROTTOTAL  --   --  7.3   ALBUMIN  --   --  4.5   BILITOTAL  --   --  0.3   ALKPHOS  --   --  62   AST  --   --  43   ALT  --   --  26     CBC  Recent Labs   Lab 10/17/23  0626 10/16/23  0714 10/16/23  0554 10/15/23  1702   WBC 5.5 5.3 5.7 6.6   RBC 4.23 4.23 4.02 4.48   HGB 12.4 12.4 11.7 13.2   HCT 38.3 38.0 35.9 40.7   MCV 91 90 89 91   MCH 29.3 29.3 29.1 29.5   MCHC 32.4 32.6 32.6 32.4   RDW 13.3 13.1 13.1 13.2    204 186 237     INRNo lab results found in last 7 days.  TROPONINS   Lab Results   Component Value Date    TROPI 37.500 () 08/16/2008    TROPI 19.200 () 08/15/2008    TROPI <0.012 08/15/2008                                                                                                               WILTON DICKEY MD

## 2023-10-17 NOTE — PLAN OF CARE
[id number]: A&Ox4. VSS on RA. Tele SB 50s. Denies CP or shortness of breath. R radial site CDI CMS intact. Up independently. NPO @ 0000. Plan for cardiac MRI 10-17.

## 2023-10-17 NOTE — PROGRESS NOTES
Paged regarding chest pain. 2/10 severity. S/p cath showing no significant CAD earlier today. RN checked ECG, reviewed, stable from before. Plan for cardiac MRI in AM. Recommend SL NTG, morphine for analgesia. DDx includes myopericarditis, will check a troponin now and in AM, add on inflammation markers to labs in AM. Please page with questions/concerns appreciate close cares.     Bennie Jackson MD, [facility]  Cardiology

## 2023-10-17 NOTE — PLAN OF CARE
Goal Outcome Evaluation:      Plan of Care Reviewed With: patient    Overall Patient Progress: no changeOverall Patient Progress: no change       Neuro:intact  CV/Rhythm:angio no intervention. R TR site CDI, bandaid in place. At 2130 C/O 2/10 L breast/chest pain - EKG done. Dr Manuel greenfield, Pt refused nitroglcyerin/morphine/oxycodone. Trop drawn, sed rates in am, cardiac MRI in am, check list faxed  Resp/02:RA  GI/Diet:LSF  :voiding  Skin/Incisions/Sites:intact  Pulses/CMS:intact  Edema:none  Activity/Falls Risk:SBA with sedation  Lines/Drains/IVs:PIV, R radial site CDI  Labs/BGM:trop pending CP at 2130, sed rates in am, trop improved at 177  Test/Procedures:angio today no intervention, cardiac MRI in am  VS/Pain:stable, 2/10 CP at 2130 no change, seems to improve with movement  DC Plan: ? Post cardiac work up  Other:-

## 2023-10-17 NOTE — PLAN OF CARE
Patient is A&O x 4, VSS, SR, HR 74. Patient denies pain , tolerate activities well, denies shortness of breath, CP or nausea. Tolerating reg diet, voiding adequate amts urine. Patient  had cardiac MRI and ECHO today.  Plan: Observe overnight for recurrent chest pain episodes and consider discharge tomorrow.

## 2023-10-17 NOTE — PROVIDER NOTIFICATION
Pt c/o 2/10 L sided CP under breast. States this is not the CP that brought her to the hospital. EKG done. Repositioning seems to improve pain. Denies SOB. SB. Dr Manuel Reynoso    Pt refused nitroglycerin, refused morphine, refused oxycodone. Awaiting results of trop. Sed rates in am. Cardiac MRI in am

## 2023-10-18 VITALS
WEIGHT: 119 LBS | HEART RATE: 65 BPM | BODY MASS INDEX: 18.68 KG/M2 | RESPIRATION RATE: 16 BRPM | TEMPERATURE: 98.5 F | DIASTOLIC BLOOD PRESSURE: 76 MMHG | OXYGEN SATURATION: 99 % | SYSTOLIC BLOOD PRESSURE: 115 MMHG | HEIGHT: 67 IN

## 2023-10-18 LAB
ATRIAL RATE - MUSE: 54 BPM
ATRIAL RATE - MUSE: 60 BPM
DIASTOLIC BLOOD PRESSURE - MUSE: NORMAL MMHG
DIASTOLIC BLOOD PRESSURE - MUSE: NORMAL MMHG
INTERPRETATION ECG - MUSE: NORMAL
INTERPRETATION ECG - MUSE: NORMAL
P AXIS - MUSE: 43 DEGREES
P AXIS - MUSE: 64 DEGREES
PR INTERVAL - MUSE: 132 MS
PR INTERVAL - MUSE: 134 MS
QRS DURATION - MUSE: 100 MS
QRS DURATION - MUSE: 104 MS
QT - MUSE: 426 MS
QT - MUSE: 466 MS
QTC - MUSE: 426 MS
QTC - MUSE: 441 MS
R AXIS - MUSE: 70 DEGREES
R AXIS - MUSE: 86 DEGREES
SYSTOLIC BLOOD PRESSURE - MUSE: NORMAL MMHG
SYSTOLIC BLOOD PRESSURE - MUSE: NORMAL MMHG
T AXIS - MUSE: -24 DEGREES
T AXIS - MUSE: 89 DEGREES
VENTRICULAR RATE- MUSE: 54 BPM
VENTRICULAR RATE- MUSE: 60 BPM

## 2023-10-18 PROCEDURE — 99239 HOSP IP/OBS DSCHRG MGMT >30: CPT | Performed by: HOSPITALIST

## 2023-10-18 PROCEDURE — 99232 SBSQ HOSP IP/OBS MODERATE 35: CPT | Performed by: INTERNAL MEDICINE

## 2023-10-18 PROCEDURE — 250N000013 HC RX MED GY IP 250 OP 250 PS 637: Performed by: INTERNAL MEDICINE

## 2023-10-18 PROCEDURE — 250N000013 HC RX MED GY IP 250 OP 250 PS 637: Performed by: STUDENT IN AN ORGANIZED HEALTH CARE EDUCATION/TRAINING PROGRAM

## 2023-10-18 RX ORDER — ROSUVASTATIN CALCIUM 40 MG/1
40 TABLET, COATED ORAL AT BEDTIME
Qty: 30 TABLET | Refills: 0 | Status: SHIPPED | OUTPATIENT
Start: 2023-10-18 | End: 2023-11-10

## 2023-10-18 RX ORDER — CLOPIDOGREL BISULFATE 75 MG/1
75 TABLET ORAL DAILY
Status: DISCONTINUED | OUTPATIENT
Start: 2023-10-19 | End: 2023-10-18 | Stop reason: HOSPADM

## 2023-10-18 RX ORDER — CLOPIDOGREL BISULFATE 75 MG/1
75 TABLET ORAL DAILY
Qty: 90 TABLET | Refills: 3 | Status: SHIPPED | OUTPATIENT
Start: 2023-10-19 | End: 2024-09-30

## 2023-10-18 RX ORDER — AMLODIPINE BESYLATE 5 MG/1
5 TABLET ORAL DAILY
Qty: 30 TABLET | Refills: 0 | Status: SHIPPED | OUTPATIENT
Start: 2023-10-18 | End: 2023-11-10

## 2023-10-18 RX ADMIN — ASPIRIN 81 MG: 81 TABLET, COATED ORAL at 08:05

## 2023-10-18 RX ADMIN — AMLODIPINE BESYLATE 5 MG: 5 TABLET ORAL at 08:05

## 2023-10-18 ASSESSMENT — ACTIVITIES OF DAILY LIVING (ADL)
ADLS_ACUITY_SCORE: 20

## 2023-10-18 NOTE — DISCHARGE SUMMARY
[facility]  Hospitalist Discharge Summary      Date of Admission:  10/15/2023  Date of Discharge:  10/18/2023  Discharging Provider: Rachana Dowling DO  Discharge Service: Hospitalist Service    Discharge Diagnoses   NSTEMI  Recurrent chest pain, ?coronary vasospasm.  Hx of CAD s/p IWMI 2008 with revascularization of dOM   Hx of RVOT VT (exercise induced)   Prediabetes  Liver lesions    Clinically Significant Risk Factors          Follow-ups Needed After Discharge   Follow-up Appointments     Follow-up and recommended labs and tests       Follow up with primary care provider, within 7-14 days for hospital   follow- up.  The following labs/tests are recommended: HgbA1C in 3mo.    Follow up with cardiology in December 2023 as scheduled with Dr Baldwin.            Discharge Disposition   Discharged to home  Condition at discharge: Stable    Hospital Course   Robin Cazares is a 68 year old female with past medical history significant for prior MI admitted on 10/15/2023 with NSTEMI.      NSTEMI  Recurrent chest pain, ?coronary vasospasm.  Hx of CAD s/p IWMI 2008 with revascularization of dOM   Hx of RVOT VT (exercise induced)   Pt presented to the ED with sudden onset chest discomfort while out for a walk. She has a hx of CAD with a MI in 2008. She has had no issues since that time.   * EKG in the ED was without acute ischemic changes.   *trop 10--169--366--349, consistent with NSTEMI. Recurrent trop elevation after chest pain 10/16 evening. Inflammatory markers negative.  *10/16 cardiac cath: minimal RCA disease. Left main, LAD and circ normal. LV gram near normal EF 50% with focal mid-inferolateral akinesis  *10/17 echo: LVSF low normal, EF 50-55%. Severe hypokinesis of basal and mid septum. Inferior wall not well seen, but mid segment is hypokinetic.   *10/17 cardiac MRI: normal systolic function. Mid inferolateral akinesis. LVEF 58%, RVEF 57%. Nontransmural scar in mid inferolateral  segments consistent with scar in RCA/circumflex distribution. Small, non CAD, mid myocardial focal scars in distal anterior portion of LV and distal inferolateral region. ?scar related to prior myocarditis. Liver lesions noted.  - Continue PTA ASA  - Plavix 75mg daily added for 3-6mo due to NSTEMI  - simvastatin changed to rosuvastatin  - Continue PTA metoprolol  - discontinued PTA lisinopril, started amlodipine 5mg daily  - patient has severe headaches with nitroglycerin  - cardiology appreciated, has follow-up arranged early December with Dr Baldwin  - follow up with PCP within 2 weeks (preferably after MRI liver completed)    Prediabetes  Technically meets criteria for prediabetes with A1C of 5.8  - follow up with PCP, repeat A1C in 3mo    Liver lesions  Noted on cardiac MRI. LFTs WNL on 10/15  - outpatient MRI liver w/wo ordered for further information  - Defer further follow-up to PCP    Consultations This Hospital Stay   PHARMACY IP CONSULT  CARDIOLOGY IP CONSULT  PHARMACY IP CONSULT  PHARMACY IP CONSULT  PHARMACY IP CONSULT  SMOKING CESSATION PROGRAM IP CONSULT    Code Status   Full Code    Time Spent on this Encounter   I, Rachana Dowling DO, personally saw the patient today and spent greater than 30 minutes discharging this patient.       Rachana Dowling DO  [facility]  [address] [place] [address], [address]  [facility] [address]  Phone: [phone]  ______________________________________________________________________    Physical Exam   Vital Signs: Temp: 98.5  F (36.9  C) Temp src: Oral BP: 115/76 Pulse: 65   Resp: 16 SpO2: 99 % O2 Device: None (Room air)    Weight: 119 lbs 0 oz    Patient seen and examined. She is feeling well. No recurrent chest pain overnight. Has been frequently walking. Tolerating diet. Updated regarding potential liver lesions seen on cardiac MRI, discussed plan for follow-up after discharge. Stable for discharge to home.    Constitutional: Awake, alert,  cooperative, no apparent distress  Respiratory: Clear to auscultation bilaterally, no crackles or wheezing  Cardiovascular: Regular rate and rhythm, normal S1 and S2, and no murmur noted  GI: Normal bowel sounds, soft, non-distended, non-tender  Skin/Integumen: No rashes, no cyanosis, no edema  Other:         Primary Care Physician   Physician No Ref-Primary    Discharge Orders      MR Liver wo & w Contrast    Please try to schedule within 2 weeks if possible     Reason for your hospital stay    Chest pain, suspected to be due to spasm of coronary artery     Activity    Your activity upon discharge: activity as tolerated     Discharge Instructions    1. Stop taking lisinopril. You will instead take amlodipine 5mg once daily. This will help decrease risk of spasm in the arteries and will lower your blood pressure.    2. Stop taking simvastatin. You will instead take rosuvastatin 40mg once daily.    3. You will continue to take 81mg aspirin once daily and will take plavix 75mg once daily for 3-6 months.    4. You had some liver lesions seen on cardiac MRI. These are suspected to be benign, but a dedicated MRI of your liver is recommended for further evaluation.     Follow-up and recommended labs and tests     Follow up with primary care provider, within 7-14 days for hospital follow- up.  The following labs/tests are recommended: HgbA1C in 3mo.    Follow up with cardiology in December 2023 as scheduled with Dr Baldwin.     Diet    Follow this diet upon discharge:   Low Saturated Fat Na <2400 mg       Significant Results and Procedures   Most Recent 3 CBC's:  Recent Labs   Lab Test 10/17/23  0626 10/16/23  0714 10/16/23  0554   WBC 5.5 5.3 5.7   HGB 12.4 12.4 11.7   MCV 91 90 89    204 186     Most Recent 3 BMP's:  Recent Labs   Lab Test 10/17/23  0626 10/16/23  0554 10/15/23  1702    142 144   POTASSIUM 4.1 3.7 3.9   CHLORIDE 105 106 105   CO2 24 25 23   BUN 11.3 14.8 20.0   CR 0.71 0.79 0.83   ANIONGAP 9  11 16*   HEIDI 9.2 8.7* 9.2   * 97 141*     Most Recent 3 Troponin's:No lab results found.,   Results for orders placed or performed during the hospital encounter of 10/15/23   Chest XR,  PA & LAT    Narrative    EXAM: XR CHEST 2 VIEWS  LOCATION: [facility]  DATE: 10/15/2023    INDICATION: chest pain  COMPARISON: 8/15/2008      Impression    IMPRESSION: Negative chest.   Echocardiogram Complete     Value    LVEF  50-55%    Narrative    [id number]  [id number]  [id number]  2010^ANITRA^ELYSSA^A     [facility]  Echocardiography Laboratory  [address]  [address]  [place] [address]  Phone [phone]  Fax [phone]     Name: RAS CASTILLO  MRN: [id number]  : 1955  Study Date: 10/17/2023 08:20 AM  Age: 68 yrs  Gender: Female  Patient Location: [facility]  Reason For Study: Syncope  Ordering Physician: ELYSSA AYOUB  Performed By: Marnie Stubbs     BSA: 1.6 m2  Height: 67 in  Weight: 122 lb  HR: 57  BP: 136/77 mmHg  ______________________________________________________________________________  Procedure  Complete Echo Adult.  ______________________________________________________________________________  Interpretation Summary     Left ventricular systolic function is low normal. The visual ejection fraction  is 50-55%.  There is severe hypokinesis of basal and mid septum. Inferior wall not well  seen but mid segment appears hypokinetic on limited views. Contrast was not  used.  No hemodynamically significant valvular disease.  Septal wall motion was normal on resting part of the stress test in 2017.  ______________________________________________________________________________  Left Ventricle  The left ventricle is normal in size. There is normal left ventricular wall  thickness. Left ventricular systolic function is low normal. The visual  ejection fraction is 50-55%. Grade I or early diastolic  dysfunction. Inferior  wall not well seen but mid segment appears hypokinetic on limited views.  Contrast was not used.     Right Ventricle  The right ventricle is normal in size and function.     Atria  Normal left atrial size. Right atrial size is normal.     Mitral Valve  The mitral valve leaflets appear normal. There is no evidence of stenosis,  fluttering, or prolapse. There is trace mitral regurgitation.     Tricuspid Valve  Normal tricuspid valve. There is trace tricuspid regurgitation. Right  ventricular systolic pressure could not be approximated due to inadequate  tricuspid regurgitation.     Aortic Valve  There is mild trileaflet aortic sclerosis. There is trace aortic  regurgitation. No aortic stenosis is present. The mean AoV pressure gradient  is 3.0 mmHg.     Pulmonic Valve  The pulmonic valve is not well visualized.     Vessels  The aortic root is normal size. Normal size ascending aorta. IVC diameter <2.1  cm collapsing >50% with sniff suggests a normal RA pressure of 3 mmHg.     Pericardium  There is no pericardial effusion.     Rhythm  Sinus rhythm was noted.     ______________________________________________________________________________  MMode/2D Measurements & Calculations  IVSd: 0.79 cm  LVIDd: 4.4 cm  LVIDs: 2.7 cm  LVPWd: 0.74 cm  FS: 38.5 %  LV mass(C)d: 103.5 grams  LV mass(C)dI: 63.2 grams/m2     Ao root diam: 2.9 cm  asc Aorta Diam: 2.8 cm  LVOT diam: 1.8 cm  LVOT area: 2.5 cm2  Ao root diam index Ht(cm/m): 1.7  Ao root diam index BSA (cm/m2): 1.8  Asc Ao diam index BSA (cm/m2): 1.7  Asc Ao diam index Ht(cm/m): 1.6  LA Volume (BP): 17.7 ml  LA Volume Index (BP): 10.8 ml/m2  RWT: 0.33     TAPSE: 3.5 cm     Doppler Measurements & Calculations  MV E max adal: 68.1 cm/sec  MV A max adal: 92.2 cm/sec  MV E/A: 0.74  MV dec time: 0.28 sec  Ao V2 max: 110.0 cm/sec  Ao max P.0 mmHg  Ao V2 mean: 73.3 cm/sec  Ao mean PG: 3.0 mmHg  Ao V2 VTI: 25.2 cm  TEREZA(I,D): 2.2 cm2  TEREZA(V,D): 2.1 cm2  LV V1  max PG: 3.3 mmHg  LV V1 max: 91.2 cm/sec  LV V1 VTI: 22.2 cm  SV(LVOT): 56.5 ml  SI(LVOT): 34.5 ml/m2  PA V2 max: 87.1 cm/sec  PA max PG: 3.0 mmHg  PA acc time: 0.11 sec  AV Jaswant Ratio (DI): 0.83  TEREZA Index (cm2/m2): 1.4  E/E' av.3  Lateral E/e': 6.0  Medial E/e': 8.6  RV S Jaswant: 11.2 cm/sec     ______________________________________________________________________________  Report approved by: Edmond Kitchen 10/17/2023 11:21 AM         Cardiac Catheterization    Narrative     Minimal RCA disease. Left main, LAD and Circumflex are angiographically   normal  LV gram: near normal LV function (EF ~50%), focal mid-inferolateral   akinesis could be secondary to prior MI     MR Cardiac w contrast    Narrative                                                     [facility]                                                     CMR Report      MRN:                  [id number]                                  Name:        RAS CASTILLO                                  :                                                   Scan Date:           2023-Oct-17                                  Electronically signed by Dayday Traylor 2023-Oct-17 14:38:12    VITALS   ==========================================================================================================    HEIGHT: 67.0 in    (170.2 cm)  WEIGHT: 122.0 lbs    (55.3 kgs)  BSA: 1.64 m^2    FINAL IMPRESSION   ==========================================================================================================    Normal biventricular size with normal systolic function.  There is mid inferolateral akinesis. Coronary  LVEF is 58%.  Coronary RVEF is 57%.  Delayed hyperenhancement reveals nontransmural scar in the mid inferolateral segments consistent with scar  in the RCA/circumflex distribution.  There is also presence of small, non-CAD, mid myocardial focal scars in the distal anterior portion of the  left ventricle as well as distal inferolateral  region. This raises possibility of scar related to prior  myocarditis.  No evidence of pericardial hyperenhancement.  Liver lesions noted. Consider dedicated imaging of liver if clinically appropriate.     SUMMARY   ==========================================================================================================    LEFT VENTRICLE: Quantitative LVEF 58.2 %. LV wall thickness is normal. LV cavity size is normal. LV systolic function is  regionally impaired. Mid inferolateral akinesis.    VIABILITY: Delayed hyperenhancement reveals's nontransmural scar in the mid inferolateral segments consistent with  scar in the RCA/circumflex distribution.  There is also an nonischemic, mid myocardial scar in the distal anterior portion of the left ventricle as  well as distal inferolateral region.  This could represent scar related to myocarditis. LV scar size is 4  %.    RIGHT VENTRICLE: Quantitative RVEF 57.2 %. RV cavity size is normal. RV systolic function is normal.    LEFT ATRIUM: LA cavity size is normal.    RIGHT ATRIUM: RA cavity size is upper limits of normal.    PERICARDIUM: There is no pericardial effusion. No evidence of pericardial hyperenhancement.    AORTIC VALVE: Aortic valve is trileaflet.    AORTIC ROOT: The aortic root is normal in size.    OTHER FINDINGS: T2 based sequences and mapping does not reveal evidence of increased myocardial fluid content.      CORE EXAM   ==========================================================================================================    MEASUREMENTS   ----------------------------------------------------------------------------------------      VOLUMETRIC ANALYSIS       ----------------------------------------------  .---------------------------------------------------------.                   LV     Reference  RV     Reference   +-----+-----------+-------+-----------+-------+-----------+   EDV  ml         100.1   ()  107.2   ()          ml/m^2      61.0   (53-87)    65.4   (49-86)     ESV  ml          41.8   (20-57)    45.9   (11-63)          ml/m^2      25.5   (13-31)    28.0   (8-34)      CO   L/min       3.61              3.80                    L/min/m^2   2.20              2.32               SV   ml          58.2   ()   61.3   ()         ml/m^2      35.5   (36-60)    37.4   (34-58)     EF   %           58.2   (60-78)    57.2   (57-81)    '-----+-----------+-------+-----------+-------+-----------'            CARDIAC OUTPUT HR:  62 bpm      LV DIMENSIONS       ----------------------------------------------          WALL THICKNESS - ANTEROSEPTAL:  0.8 cm          WALL THICKNESS - INFEROLATERAL:  0.8 cm          LV MENDY:  4.2 cm        LA DIMENSIONS (LV SYSTOLE)       ----------------------------------------------          DIAMETER:  2.2 cm        AORTIC ROOT DIMENSIONS       ----------------------------------------------          ANNULUS:  2.8 cm      17 SEGMENT   ----------------------------------------------------------------------------------------  .-----------------------------------------------------------------------------------------.   Segments            Wall Motion  Hyperenhancement  Stress Perfusion  Interpretation   +--------------------+-------------+------------------+------------------+----------------+   Base Anterior                                                                          Base Anteroseptal                                                                      Base Inferoseptal                                                                      Base Inferior                                                                          Base Inferolateral                                                                     Base Anterolateral                                                                     Mid  Anterior                                                                           Mid Anteroseptal                                                                       Mid Inferoseptal                                                                       Mid Inferior                                                                           Mid Inferolateral                26-50%                                                Mid Anterolateral                                                                      Apical Anterior                  1-25%                                                 Apical Septal                                                                          Apical Inferior                                                                        Apical Lateral                   1-25%                                                 [place]                                                                                  +--------------------+-------------+------------------+------------------+----------------+   RV Segments         Wall Motion  Hyperenhancement                    Interpretation   +--------------------+-------------+------------------+------------------+----------------+   RV Basal Anterior                                                                      RV Basal Inferior                                                                      RV Mid                                                                                 RV Apical                                                                             '--------------------+-------------+------------------+------------------+----------------'        FINDINGS       ----------------------------------------------          LV SCAR SIZE (17 SEGMENT):  4 %      SCAN INFO    ==========================================================================================================    GENERAL   ----------------------------------------------------------------------------------------      SCANNER       ----------------------------------------------          :  SIEMENS          MODEL:  Aera          PULSE SEQUENCES:  SSFP cine, 2D LGE segmented, 2D LGE single-shot, T2-weighted imaging         CONTRAST AGENT       ----------------------------------------------          TYPE:  Gadavist          GD CONCENTRATION:  0.5 M          VOLUME ADMINISTERED:  7.5 ml          DOSAGE:  0.07 mmol/kg        SETUP       ----------------------------------------------          REASON(S) FOR SCAN:  Post-Acute MI           ATTENDING PHYSICIAN:  MARC BARRIOS   ==========================================================================================================    CPT Codes  ICD10 Codes      Report generated by [employer], a product of [employer]       Discharge Medications   Current Discharge Medication List        START taking these medications    Details   amLODIPine (NORVASC) 5 MG tablet Take 1 tablet (5 mg) by mouth daily  Qty: 30 tablet, Refills: 0    Associated Diagnoses: Chest pain, unspecified type      clopidogrel (PLAVIX) 75 MG tablet Take 1 tablet (75 mg) by mouth daily  Qty: 90 tablet, Refills: 3    Associated Diagnoses: NSTEMI (non-ST elevated myocardial infarction) (H)      rosuvastatin (CRESTOR) 40 MG tablet Take 1 tablet (40 mg) by mouth at bedtime  Qty: 30 tablet, Refills: 0    Associated Diagnoses: Chest pain, unspecified type           CONTINUE these medications which have NOT CHANGED    Details   acetaminophen (TYLENOL) 500 MG tablet Take 1,000 mg by mouth every 8 hours as needed for mild pain      alendronate (FOSAMAX) 35 MG tablet Take 35 mg by mouth every 7 days Takes on Sunday morning      aspirin (ASA) 81 MG EC tablet Take 81 mg by  mouth daily      magnesium 250 MG tablet Take 1 tablet by mouth daily      metoprolol succinate ER (TOPROL XL) 25 MG 24 hr tablet Take 1 tablet (25 mg) by mouth At Bedtime  Qty: 90 tablet, Refills: 3    Associated Diagnoses: Palpitations      Multiple vitamin TABS Take 1 tablet by mouth daily           STOP taking these medications       lisinopril (ZESTRIL) 2.5 MG tablet Comments:   Reason for Stopping:         simvastatin (ZOCOR) 40 MG tablet Comments:   Reason for Stopping:             Allergies   Allergies   Allergen Reactions    Fosamax [Alendronate] Palpitations     Occurred when pt was on 70mg weekly.  Reattempting to take medication

## 2023-10-18 NOTE — DISCHARGE INSTRUCTIONS
Cardiac Angiogram Discharge Instructions - Radial    After you go home:    Have an adult stay with you until tomorrow.  Drink extra fluids for 2 days.  You may resume your normal diet.  No smoking       For 24 hours - due to the sedation you received:  Relax and take it easy.  Do NOT make any important or legal decisions.  Do NOT drive or operate machines at home or at work.  Do NOT drink alcohol.    Care of Wrist Puncture Site:    For the first 24 hrs - check the puncture site every 1-2 hours while awake.  It is normal to have soreness at the puncture site and mild tingling in your hand for up to 3 days.  Remove the bandaid after 24 hours. If there is minor oozing, apply another bandaid and remove it after 12 hours.  You may shower tomorrow.  Do NOT take a bath, or use a hot tub or pool for at least 3 days. Do NOT scrub the site. Do not use lotion or powder near the puncture site.           Activity:        For 2 days:   do not use your hand or arm to support your weight (such as rising from a chair)   do not bend your wrist (such as lifting a garage door).  do not lift more than 5 pounds or exercise your arm (such as tennis, golf or bowling).  Do NOT do any heavy activity such as exercise, lifting, or straining.     Bleeding:    If you start bleeding from the site in your wrist, sit down and press firmly on/above the site for 10 minutes.   Once bleeding stops, keep arm still for 2 hours.   Call [facility] as soon as you can.       Call 911 right away if you have heavy bleeding or bleeding that does not stop.      Medicines:    If you are taking an antiplatelet medication such as Plavix, Brilinta or Effient, do not stop taking it until you talk to your cardiologist.      If you are on Metformin (Glucophage), do not restart it until you have blood tests (within 2 to 3 days after discharge).  After you have your blood drawn, you may restart the Metformin.   Take your medications, including blood thinners, unless your  provider tells you not to.    If you take Coumadin (Warfarin), have your INR checked by your provider in  3-5 days. Call your clinic to schedule this.  If you have stopped any medicines, check with your provider about when to restart them.    Follow Up Appointments:    Follow up with [facility] Nurse Practitioner at [facility] of patient preference in 7-10 days.    Call the clinic if:    You have a large or growing hard lump around the site.  The site is red, swollen, hot or tender.  Blood or fluid is draining from the site.  You have chills or a fever greater than 101 F (38 C).  Your arm feels numb, cool or changes color.  You have hives, a rash or unusual itching.  Any questions or concerns.          [facility] [place]:    [phone] [facility] (7 days a week)

## 2023-10-18 NOTE — PLAN OF CARE
Goal Outcome Evaluation:      Plan of Care Reviewed With: patient      Discharge home with family up in the room independent tolerated food, denies pain, VSS, RA.

## 2023-10-18 NOTE — PLAN OF CARE
Pleasant lady. Pt aox4, ind, RA and full code. Tele SR. Pt denies chest pain and SOB. Angio on [id number], no intervention. CMRI completed.  Plan: discharge tomorrow.

## 2023-10-18 NOTE — PROGRESS NOTES
"       Assessment and Plan:     Acute myocardial infarction with normal coronary arteries.  Minimal plaque within the proximal right coronary artery noted.  Etiology unclear.  No definitive evidence for scad, most likely coronary vasospasm.    Recurrent chest pain episode while inpatient with new increase in troponin level, indicating new myocardial injury. Episode resolved spontaneously without treatment.  Inflammatory markers negative.  Most likely explanation seems to be coronary vasospasm.  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  Mild dyslipidemia    Recommendations:    No further episodes of chest pain since amlodipine started.     Continue empiric calcium channel blocker, amlodipine 5 mg daily, for prevention of coronary vasospasm.  Patient is intolerant to nitrates which caused severe headaches.    Continue low-dose aspirin 81 mg daily, rosuvastatin, metoprolol.  I will add Plavix 75 mg daily for at least 3-6 months due to NSTEMI.    OK for discharge home today. She has cardiology follow up in December with her primary cardiologist, Dr. Baldwin.     WILTON DICKEY MD        Interval History:     No recurrent chest pain with walking in halls. Tolerating amlodipine well.           Medications:      amLODIPine  5 mg Oral Daily    aspirin  81 mg Oral Daily    [START ON 10/19/2023] clopidogrel  75 mg Oral Daily    metoprolol succinate ER  25 mg Oral At Bedtime    rosuvastatin  40 mg Oral At Bedtime            Physical Exam:   Blood pressure 115/76, pulse 65, temperature 98.5  F (36.9  C), temperature source Oral, resp. rate 16, height 1.702 m (5' 7\"), weight 54 kg (119 lb), SpO2 99%, not currently breastfeeding.  Vitals:    10/15/23 1653 10/16/23 0300 10/17/23 0500 10/18/23 0505   Weight: 55.3 kg (122 lb) 55.8 kg (123 lb) 55.5 kg (122 lb 5.7 oz) 54 kg (119 lb)         Intake/Output Summary (Last 24 hours) " at 10/17/2023 1223  Last data filed at 10/16/2023 2330  Gross per 24 hour   Intake 720 ml   Output --   Net 720 ml       10/13 0700 - 10/18 0659  In: 1440 [P.O.:1440]  Out: 350 [Urine:350]  Net: 1090    Exam:  GENERAL APPEARANCE ADULT: Alert, in no acute distress  RESP: lungs clear to auscultation   CV: regular rate and rhythm, no murmur, rub, or gallop  ABDOMEN: normal bowel sounds, soft, nontender, no hepatosplenomegaly or other masses  EXTREMITIES: No edema         Data:   LABS (Last four results)  CMP  Recent Labs   Lab 10/17/23  0626 10/16/23  0554 10/15/23  1702    142 144   POTASSIUM 4.1 3.7 3.9   CHLORIDE 105 106 105   CO2 24 25 23   ANIONGAP 9 11 16*   * 97 141*   BUN 11.3 14.8 20.0   CR 0.71 0.79 0.83   GFRESTIMATED >90 81 76   HEIDI 9.2 8.7* 9.2   PROTTOTAL  --   --  7.3   ALBUMIN  --   --  4.5   BILITOTAL  --   --  0.3   ALKPHOS  --   --  62   AST  --   --  43   ALT  --   --  26     CBC  Recent Labs   Lab 10/17/23  0626 10/16/23  0714 10/16/23  0554 10/15/23  1702   WBC 5.5 5.3 5.7 6.6   RBC 4.23 4.23 4.02 4.48   HGB 12.4 12.4 11.7 13.2   HCT 38.3 38.0 35.9 40.7   MCV 91 90 89 91   MCH 29.3 29.3 29.1 29.5   MCHC 32.4 32.6 32.6 32.4   RDW 13.3 13.1 13.1 13.2    204 186 237     INRNo lab results found in last 7 days.  TROPONINS   Lab Results   Component Value Date    TROPI 37.500 () 08/16/2008    TROPI 19.200 () 08/15/2008    TROPI <0.012 08/15/2008                                                                                                               WILTON DICKEY MD

## 2023-10-23 ENCOUNTER — TELEPHONE (OUTPATIENT)
Dept: CARDIOLOGY | Facility: CLINIC | Age: 68
End: 2023-10-23
Payer: MEDICARE

## 2023-10-23 NOTE — TELEPHONE ENCOUNTER
Patient was admitted to [facility] for chest pain and NSTEMI.    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    10/17/23: Echo showed EF of  50-55%. There is severe hypokinesis of basal and mid septum. Inferior wall not well seen but mid segment appears hypokinetic on limited views. Contrast was not used. No hemodynamically significant valvular disease.    10/16/23: Coronary angiogram via RRA showed minimal RCA disease. Left main, LAD and Circumflex are angiographically normal. No definitive evidence for SCAD, most likely coronary vasospasm.     Continued episodes of chest pain during hospitalization.    10/17/23: cMRI showed normal systolic function. Mid inferolateral akinesis. LVEF 58%, RVEF 57%. Nontransmural scar in mid inferolateral segments consistent with scar in RCA/circumflex distribution. Small, non CAD, mid myocardial focal scars in distal anterior portion of LV and distal inferolateral region. ?scar related to prior myocarditis.    Pt was started on Norvasc, Plavix and Crestor. PTA Zestril and Zocor were discontinued at time of discharge.    Pt is scheduled for labs on 12/1/23 at 0830, followed with a echo. Will route this note to Dr. Baldwin to determine if TTE since completed this admission. Scheduled for an OV on 12/5/23 with Dr. Baldwin at our [place] [facility]. SHANA Jackson RN.

## 2023-10-23 NOTE — TELEPHONE ENCOUNTER
Writer called patient to discuss any post hospital d/c questions she may have, review medication changes, and confirm f/u appts. Patient denied any questions regarding new medications or changes with their PTA medications.    Patient denied any SOB, chest pain or lightheadedness.     RRA cardiac cath site is without bleeding, swelling, redness or signs of infection.     RN confirmed with patient that she is scheduled for labs 12/1/23 at 0830. Informed that she no longer needs PTA scheduled echo as she just had one during this admission. Reminded of scheduled OV on 12/5/23 with Dr. Baldwin at our [place] [facility].     Patient advised to call clinic with any cardiac related questions or concerns prior to this juwan't. Patient verbalized understanding and agreed with plan. SHANA Jackson RN.

## 2023-10-23 NOTE — TELEPHONE ENCOUNTER
Ginny Baldwin, DO  You1 hour ago (3:25 PM)     CD  I haven't seen this patient since 2021.  I do not have any reason to perform echo at this time.

## 2023-11-10 DIAGNOSIS — R07.9 CHEST PAIN, UNSPECIFIED TYPE: ICD-10-CM

## 2023-11-10 RX ORDER — AMLODIPINE BESYLATE 5 MG/1
5 TABLET ORAL DAILY
Qty: 90 TABLET | Refills: 0 | Status: SHIPPED | OUTPATIENT
Start: 2023-11-10 | End: 2023-12-05

## 2023-11-10 RX ORDER — ROSUVASTATIN CALCIUM 40 MG/1
40 TABLET, COATED ORAL AT BEDTIME
Qty: 90 TABLET | Refills: 0 | Status: SHIPPED | OUTPATIENT
Start: 2023-11-10 | End: 2023-12-05

## 2023-11-16 ENCOUNTER — HOSPITAL ENCOUNTER (OUTPATIENT)
Dept: MRI IMAGING | Facility: CLINIC | Age: 68
Discharge: HOME | End: 2023-11-16
Attending: HOSPITALIST | Admitting: HOSPITALIST
Payer: MEDICARE

## 2023-11-16 DIAGNOSIS — K76.9 LIVER LESION: ICD-10-CM

## 2023-11-16 PROCEDURE — A9585 GADOBUTROL INJECTION: HCPCS | Performed by: HOSPITALIST

## 2023-11-16 PROCEDURE — 74183 MRI ABD W/O CNTR FLWD CNTR: CPT | Mod: MG

## 2023-11-16 PROCEDURE — 255N000002 HC RX 255 OP 636: Performed by: HOSPITALIST

## 2023-11-16 RX ORDER — GADOBUTROL 604.72 MG/ML
5 INJECTION INTRAVENOUS ONCE
Status: COMPLETED | OUTPATIENT
Start: 2023-11-16 | End: 2023-11-16

## 2023-11-16 RX ADMIN — GADOBUTROL 5 ML: 604.72 INJECTION INTRAVENOUS at 09:52

## 2023-12-01 ENCOUNTER — LAB (OUTPATIENT)
Dept: LAB | Facility: CLINIC | Age: 68
End: 2023-12-01
Payer: MEDICARE

## 2023-12-01 DIAGNOSIS — E78.2 MIXED HYPERLIPIDEMIA: ICD-10-CM

## 2023-12-01 DIAGNOSIS — I21.19 INFERIOR MI (H): ICD-10-CM

## 2023-12-01 DIAGNOSIS — I21.4 NSTEMI (NON-ST ELEVATED MYOCARDIAL INFARCTION) (H): ICD-10-CM

## 2023-12-01 DIAGNOSIS — R07.9 CHEST PAIN, UNSPECIFIED TYPE: ICD-10-CM

## 2023-12-01 DIAGNOSIS — I25.10 CORONARY ARTERY DISEASE INVOLVING NATIVE CORONARY ARTERY OF NATIVE HEART WITHOUT ANGINA PECTORIS: ICD-10-CM

## 2023-12-01 LAB
ALT SERPL W P-5'-P-CCNC: 23 U/L (ref 0–50)
ANION GAP SERPL CALCULATED.3IONS-SCNC: 9 MMOL/L (ref 7–15)
BUN SERPL-MCNC: 12.4 MG/DL (ref 8–23)
CALCIUM SERPL-MCNC: 9.7 MG/DL (ref 8.8–10.2)
CHLORIDE SERPL-SCNC: 104 MMOL/L (ref 98–107)
CHOLEST SERPL-MCNC: 152 MG/DL
CREAT SERPL-MCNC: 0.81 MG/DL (ref 0.51–0.95)
DEPRECATED HCO3 PLAS-SCNC: 27 MMOL/L (ref 22–29)
EGFRCR SERPLBLD CKD-EPI 2021: 78 ML/MIN/1.73M2
GLUCOSE SERPL-MCNC: 100 MG/DL (ref 70–99)
HDLC SERPL-MCNC: 62 MG/DL
LDLC SERPL CALC-MCNC: 76 MG/DL
NONHDLC SERPL-MCNC: 90 MG/DL
POTASSIUM SERPL-SCNC: 4.5 MMOL/L (ref 3.4–5.3)
SODIUM SERPL-SCNC: 140 MMOL/L (ref 135–145)
TRIGL SERPL-MCNC: 69 MG/DL

## 2023-12-01 PROCEDURE — 84460 ALANINE AMINO (ALT) (SGPT): CPT | Performed by: PHYSICIAN ASSISTANT

## 2023-12-01 PROCEDURE — 80048 BASIC METABOLIC PNL TOTAL CA: CPT | Performed by: PHYSICIAN ASSISTANT

## 2023-12-01 PROCEDURE — 80061 LIPID PANEL: CPT | Performed by: PHYSICIAN ASSISTANT

## 2023-12-01 PROCEDURE — 36415 COLL VENOUS BLD VENIPUNCTURE: CPT | Performed by: PHYSICIAN ASSISTANT

## 2023-12-01 PROCEDURE — 84443 ASSAY THYROID STIM HORMONE: CPT | Performed by: INTERNAL MEDICINE

## 2023-12-05 ENCOUNTER — OFFICE VISIT (OUTPATIENT)
Dept: CARDIOLOGY | Facility: CLINIC | Age: 68
End: 2023-12-05
Payer: MEDICARE

## 2023-12-05 VITALS
DIASTOLIC BLOOD PRESSURE: 73 MMHG | WEIGHT: 125.8 LBS | SYSTOLIC BLOOD PRESSURE: 148 MMHG | HEART RATE: 73 BPM | BODY MASS INDEX: 19.07 KG/M2 | HEIGHT: 68 IN

## 2023-12-05 DIAGNOSIS — I21.4 NSTEMI (NON-ST ELEVATED MYOCARDIAL INFARCTION) (H): Primary | ICD-10-CM

## 2023-12-05 DIAGNOSIS — R07.9 CHEST PAIN, UNSPECIFIED TYPE: ICD-10-CM

## 2023-12-05 DIAGNOSIS — I21.19 INFERIOR MI (H): ICD-10-CM

## 2023-12-05 LAB — TSH SERPL DL<=0.005 MIU/L-ACNC: 0.76 UIU/ML (ref 0.3–4.2)

## 2023-12-05 PROCEDURE — 99215 OFFICE O/P EST HI 40 MIN: CPT | Performed by: INTERNAL MEDICINE

## 2023-12-05 RX ORDER — AMLODIPINE BESYLATE 5 MG/1
5 TABLET ORAL DAILY
Qty: 90 TABLET | Refills: 3 | Status: SHIPPED | OUTPATIENT
Start: 2023-12-05

## 2023-12-05 RX ORDER — ROSUVASTATIN CALCIUM 40 MG/1
40 TABLET, COATED ORAL AT BEDTIME
Qty: 90 TABLET | Refills: 3 | Status: SHIPPED | OUTPATIENT
Start: 2023-12-05

## 2023-12-05 RX ORDER — NITROGLYCERIN 0.4 MG/1
TABLET SUBLINGUAL
Qty: 30 TABLET | Refills: 11 | Status: SHIPPED | OUTPATIENT
Start: 2023-12-05 | End: 2024-10-01

## 2023-12-05 NOTE — PROGRESS NOTES
HPI and Plan:   Robin Cazares is a 68 year old female who presents with history of coronary artery disease with remote inferior wall myocardial infarction in 2008, recent hospitalization for chest pain and non-STEMI.  She also has a history of RVOT VT and is on beta-blocker for this.  She tells me she was walking outdoors for exercise when she developed sudden onset of chest discomfort that did not go away with rest therefore she went to the ED. she had elevated cardiac enzymes with a troponin peak of 356 and underwent coronary angiogram which did not demonstrate any obstructive coronary disease.  She underwent an echocardiogram as well as a cardiac MRI, cardiac MRI demonstrated evidence of non-CAD scarring in the distal anterior and distal inferolateral region possibly from myocarditis.  CRP level was less than 3 however.  She was placed on DAPT and started on amlodipine for possible vasospastic disease.  She is here for follow-up visit, overall she has been doing well and has not had any recurrence of chest pain symptoms.  She seems to be tolerating amlodipine without side effect.  She did have concerns about hair loss and wondered if any of her medications may be contributing to this.  She has not had her thyroid checked recently and I did offer this today.  She had blood drawn a few days ago including a cholesterol panel that showed total 152 HDL 62 LDL 76 and triglycerides 69.  Basic metabolic panel appeared normal and liver function tests were normal.  There is an incidental finding of liver mass on the MRI therefore she underwent a dedicated MRI of the liver which I reviewed with her today as well that showed hepatic cysts and she had 1 pancreatic cyst as well.    Summary    1.  Non-STEMI-unclear etiology but may be related to vasospastic disease versus microvascular disease she is now on amlodipine and has not had any recurrence.  I agree with DAPT for 1 year, she is having some minor bruising issues  with this.  I also renewed a prescription for sublingual nitroglycerin to use for recurrent chest pain symptoms and gave her instruction on this medication.    2.  Hypertension-blood pressure was elevated today, I would ask her to check her blood pressure at home and if she is averaging above 140 systolic I would reinstitute lisinopril 2.5 mg daily.    3.  RVOT VT-she is on metoprolol for this and has not had any issues in recent years.  She is possibly having some hair loss issues with chronic metoprolol use.  I will check her thyroid today and if this is normal we can consider a different beta-blocker.    4.  Hyperlipidemia-on high intensity rosuvastatin, and LDL has dropped a few points from her previous measurement.    I will be happy to see her back annually or as needed please feel free to contact me with any questions you have regards to her care         Today's clinic visit entailed:  Review of external notes as documented elsewhere in note  Review of the result(s) of each unique test - cardiac MRI, angiogram, BMP, ALT, Lipids  Prescription drug management    Provider  Link to [facility] Help Grid     The level of medical decision making during this visit was of high complexity.    Orders Placed This Encounter   Procedures    TSH with free T4 reflex     Orders Placed This Encounter   Medications    amLODIPine (NORVASC) 5 MG tablet     Sig: Take 1 tablet (5 mg) by mouth daily     Dispense:  90 tablet     Refill:  3    rosuvastatin (CRESTOR) 40 MG tablet     Sig: Take 1 tablet (40 mg) by mouth at bedtime     Dispense:  90 tablet     Refill:  3    nitroGLYcerin (NITROSTAT) 0.4 MG sublingual tablet     Sig: For chest pain place 1 tablet under the tongue every 5 minutes for 3 doses. If symptoms persist 5 minutes after 1st dose call 911.     Dispense:  30 tablet     Refill:  11     Medications Discontinued During This Encounter   Medication Reason    rosuvastatin (CRESTOR) 40 MG tablet Reorder (No AVS)    amLODIPine  (NORVASC) 5 MG tablet Reorder (No AVS)         Encounter Diagnoses   Name Primary?    Chest pain, unspecified type     NSTEMI (non-ST elevated myocardial infarction) (H) Yes    Inferior MI (H)        CURRENT MEDICATIONS:  Current Outpatient Medications   Medication Sig Dispense Refill    acetaminophen (TYLENOL) 500 MG tablet Take 1,000 mg by mouth every 8 hours as needed for mild pain      alendronate (FOSAMAX) 35 MG tablet Take 70 mg by mouth every 7 days Takes on Sunday morning      amLODIPine (NORVASC) 5 MG tablet Take 1 tablet (5 mg) by mouth daily 90 tablet 3    aspirin (ASA) 81 MG EC tablet Take 81 mg by mouth daily      clopidogrel (PLAVIX) 75 MG tablet Take 1 tablet (75 mg) by mouth daily 90 tablet 3    magnesium 250 MG tablet Take 1 tablet by mouth daily      metoprolol succinate ER (TOPROL XL) 25 MG 24 hr tablet Take 1 tablet (25 mg) by mouth At Bedtime 90 tablet 3    Multiple vitamin TABS Take 1 tablet by mouth daily      nitroGLYcerin (NITROSTAT) 0.4 MG sublingual tablet For chest pain place 1 tablet under the tongue every 5 minutes for 3 doses. If symptoms persist 5 minutes after 1st dose call 911. 30 tablet 11    rosuvastatin (CRESTOR) 40 MG tablet Take 1 tablet (40 mg) by mouth at bedtime 90 tablet 3       ALLERGIES     Allergies   Allergen Reactions    Fosamax [Alendronate] Palpitations     Occurred when pt was on 70mg weekly.  Reattempting to take medication       PAST MEDICAL HISTORY:  Past Medical History:   Diagnosis Date    CAD (coronary artery disease)     8/2008 angioplasty occluded distal OM branch    Hyperlipidemia     Internal hemorrhoids     MI (myocardial infarction) (H)     2008    Osteopenia     Osteoporosis     Palpitations     S/P breast lumpectomy     Vaginal bleeding        PAST SURGICAL HISTORY:  Past Surgical History:   Procedure Laterality Date    CV CORONARY ANGIOGRAM N/A 10/16/2023    Procedure: Coronary Angiogram;  Surgeon: Kevin Ponce MD;  Location: [facility]  "CATH LAB    CV LEFT VENTRICULOGRAM N/A 10/16/2023    Procedure: Left Ventriculogram;  Surgeon: Kevin Ponce MD;  Location:  HEART CARDIAC CATH LAB    CV PCI N/A 10/16/2023    Procedure: Percutaneous Coronary Intervention;  Surgeon: Kevin Ponce MD;  Location:  HEART CARDIAC CATH LAB    HEART CATH, ANGIOPLASTY      8/2008 angioplasty occluded distal OM branch    OPEN REDUCTION INTERNAL FIXATION FEMUR PROXIMAL Right 6/2/2018    Procedure: OPEN REDUCTION INTERNAL FIXATION FEMUR PROXIMAL;  OPEN REDUCTION INTERNAL FIXATION RIGHT FEMUR FRACTURE;  Surgeon: Perico Zapata MD;  Location:  OR       FAMILY HISTORY:  Family History   Problem Relation Age of Onset    Heart Disease Father     Leukemia Mother     Heart Disease Other        SOCIAL HISTORY:  Social History     Socioeconomic History    Marital status:      Spouse name: None    Number of children: None    Years of education: None    Highest education level: None   Tobacco Use    Smoking status: Never    Smokeless tobacco: Never   Substance and Sexual Activity    Alcohol use: Yes     Alcohol/week: 0.0 standard drinks of alcohol     Comment: rare     Drug use: No   Other Topics Concern    Caffeine Concern No     Comment: rare    Sleep Concern Yes    Stress Concern No    Weight Concern No    Special Diet No    Exercise No     Comment: walks 5-7 days week       Review of Systems:  Skin:        Eyes:       ENT:       Respiratory:  Positive for cough  Cardiovascular:  Negative;palpitations;chest pain;dizziness;syncope or near-syncope;cyanosis;edema lightheadedness;Positive for;fatigue  Gastroenterology:      Genitourinary:       Musculoskeletal:  Positive for    Neurologic:       Psychiatric:  Positive for sleep disturbances  Heme/Lymph/Imm:       Endocrine:         Physical Exam:  Vitals: BP (!) 148/73   Pulse 73   Ht 1.715 m (5' 7.5\")   Wt 57.1 kg (125 lb 12.8 oz)   BMI 19.41 kg/m      Constitutional:           Skin:         "     Head:           Eyes:           Lymph:      ENT:           Neck:           Respiratory:            Cardiac:                                                           GI:           Extremities and Muscular Skeletal:                 Neurological:           Psych:           Recent Lab Results:  LIPID RESULTS:  Lab Results   Component Value Date    CHOL 152 12/01/2023    CHOL 183 10/19/2020    HDL 62 12/01/2023    HDL 67 10/19/2020    LDL 76 12/01/2023    LDL 97 10/19/2020    TRIG 69 12/01/2023    TRIG 94 10/19/2020    CHOLHDLRATIO 2.4 08/24/2015       LIVER ENZYME RESULTS:  Lab Results   Component Value Date    AST 43 10/15/2023    ALT 23 12/01/2023    ALT 19 09/14/2017       CBC RESULTS:  Lab Results   Component Value Date    WBC 5.5 10/17/2023    WBC 11.6 (H) 06/01/2018    RBC 4.23 10/17/2023    RBC 4.55 06/01/2018    HGB 12.4 10/17/2023    HGB 10.1 (L) 06/04/2018    HCT 38.3 10/17/2023    HCT 40.4 06/01/2018    MCV 91 10/17/2023    MCV 89 06/01/2018    MCH 29.3 10/17/2023    MCH 29.9 06/01/2018    MCHC 32.4 10/17/2023    MCHC 33.7 06/01/2018    RDW 13.3 10/17/2023    RDW 12.5 06/01/2018     10/17/2023     (L) 06/02/2018       BMP RESULTS:  Lab Results   Component Value Date     12/01/2023     10/19/2020    POTASSIUM 4.5 12/01/2023    POTASSIUM 4.4 12/09/2021    POTASSIUM 4.0 10/19/2020    CHLORIDE 104 12/01/2023    CHLORIDE 107 12/09/2021    CHLORIDE 108 10/19/2020    CO2 27 12/01/2023    CO2 29 12/09/2021    CO2 26 10/19/2020    ANIONGAP 9 12/01/2023    ANIONGAP 5 12/09/2021    ANIONGAP 6 10/19/2020     (H) 12/01/2023     (H) 12/09/2021     (H) 10/19/2020    BUN 12.4 12/01/2023    BUN 18 12/09/2021    BUN 14 10/19/2020    CR 0.81 12/01/2023    CR 0.89 10/19/2020    GFRESTIMATED 78 12/01/2023    GFRESTIMATED 67 10/19/2020    GFRESTBLACK 78 10/19/2020    HEIDI 9.7 12/01/2023    HEIDI 8.4 (L) 10/19/2020        A1C RESULTS:  Lab Results   Component Value Date    A1C 5.8  (H) 10/15/2023       INR RESULTS:  Lab Results   Component Value Date    INR 1.03 06/01/2018    INR 1.02 08/15/2008           CC  No referring provider defined for this encounter.

## 2023-12-05 NOTE — LETTER
12/5/2023    John Conely, MD [facility] [address] [facility] [address]    RE: Robin Cazares       Dear Colleague,     I had the pleasure of seeing Robin Cazares in the [facility].  HPI and Plan:   Robin Cazares is a 68 year old female who presents with history of coronary artery disease with remote inferior wall myocardial infarction in 2008, recent hospitalization for chest pain and non-STEMI.  She also has a history of RVOT VT and is on beta-blocker for this.  She tells me she was walking outdoors for exercise when she developed sudden onset of chest discomfort that did not go away with rest therefore she went to the ED. she had elevated cardiac enzymes with a troponin peak of 356 and underwent coronary angiogram which did not demonstrate any obstructive coronary disease.  She underwent an echocardiogram as well as a cardiac MRI, cardiac MRI demonstrated evidence of non-CAD scarring in the distal anterior and distal inferolateral region possibly from myocarditis.  CRP level was less than 3 however.  She was placed on DAPT and started on amlodipine for possible vasospastic disease.  She is here for follow-up visit, overall she has been doing well and has not had any recurrence of chest pain symptoms.  She seems to be tolerating amlodipine without side effect.  She did have concerns about hair loss and wondered if any of her medications may be contributing to this.  She has not had her thyroid checked recently and I did offer this today.  She had blood drawn a few days ago including a cholesterol panel that showed total 152 HDL 62 LDL 76 and triglycerides 69.  Basic metabolic panel appeared normal and liver function tests were normal.  There is an incidental finding of liver mass on the MRI therefore she underwent a dedicated MRI of the liver which I reviewed with her today as well that showed hepatic cysts and she had 1 pancreatic cyst as  well.    Summary    1.  Non-STEMI-unclear etiology but may be related to vasospastic disease versus microvascular disease she is now on amlodipine and has not had any recurrence.  I agree with DAPT for 1 year, she is having some minor bruising issues with this.  I also renewed a prescription for sublingual nitroglycerin to use for recurrent chest pain symptoms and gave her instruction on this medication.    2.  Hypertension-blood pressure was elevated today, I would ask her to check her blood pressure at home and if she is averaging above 140 systolic I would reinstitute lisinopril 2.5 mg daily.    3.  RVOT VT-she is on metoprolol for this and has not had any issues in recent years.  She is possibly having some hair loss issues with chronic metoprolol use.  I will check her thyroid today and if this is normal we can consider a different beta-blocker.    4.  Hyperlipidemia-on high intensity rosuvastatin, and LDL has dropped a few points from her previous measurement.    I will be happy to see her back annually or as needed please feel free to contact me with any questions you have regards to her care         Today's clinic visit entailed:  Review of external notes as documented elsewhere in note  Review of the result(s) of each unique test - cardiac MRI, angiogram, BMP, ALT, Lipids  Prescription drug management    Provider  Link to MDM Help Grid     The level of medical decision making during this visit was of high complexity.    Orders Placed This Encounter   Procedures    TSH with free T4 reflex     Orders Placed This Encounter   Medications    amLODIPine (NORVASC) 5 MG tablet     Sig: Take 1 tablet (5 mg) by mouth daily     Dispense:  90 tablet     Refill:  3    rosuvastatin (CRESTOR) 40 MG tablet     Sig: Take 1 tablet (40 mg) by mouth at bedtime     Dispense:  90 tablet     Refill:  3    nitroGLYcerin (NITROSTAT) 0.4 MG sublingual tablet     Sig: For chest pain place 1 tablet under the tongue every 5 minutes  for 3 doses. If symptoms persist 5 minutes after 1st dose call 911.     Dispense:  30 tablet     Refill:  11     Medications Discontinued During This Encounter   Medication Reason    rosuvastatin (CRESTOR) 40 MG tablet Reorder (No AVS)    amLODIPine (NORVASC) 5 MG tablet Reorder (No AVS)         Encounter Diagnoses   Name Primary?    Chest pain, unspecified type     NSTEMI (non-ST elevated myocardial infarction) (H) Yes    Inferior MI (H)        CURRENT MEDICATIONS:  Current Outpatient Medications   Medication Sig Dispense Refill    acetaminophen (TYLENOL) 500 MG tablet Take 1,000 mg by mouth every 8 hours as needed for mild pain      alendronate (FOSAMAX) 35 MG tablet Take 70 mg by mouth every 7 days Takes on Sunday morning      amLODIPine (NORVASC) 5 MG tablet Take 1 tablet (5 mg) by mouth daily 90 tablet 3    aspirin (ASA) 81 MG EC tablet Take 81 mg by mouth daily      clopidogrel (PLAVIX) 75 MG tablet Take 1 tablet (75 mg) by mouth daily 90 tablet 3    magnesium 250 MG tablet Take 1 tablet by mouth daily      metoprolol succinate ER (TOPROL XL) 25 MG 24 hr tablet Take 1 tablet (25 mg) by mouth At Bedtime 90 tablet 3    Multiple vitamin TABS Take 1 tablet by mouth daily      nitroGLYcerin (NITROSTAT) 0.4 MG sublingual tablet For chest pain place 1 tablet under the tongue every 5 minutes for 3 doses. If symptoms persist 5 minutes after 1st dose call 911. 30 tablet 11    rosuvastatin (CRESTOR) 40 MG tablet Take 1 tablet (40 mg) by mouth at bedtime 90 tablet 3       ALLERGIES     Allergies   Allergen Reactions    Fosamax [Alendronate] Palpitations     Occurred when pt was on 70mg weekly.  Reattempting to take medication       PAST MEDICAL HISTORY:  Past Medical History:   Diagnosis Date    CAD (coronary artery disease)     8/2008 angioplasty occluded distal OM branch    Hyperlipidemia     Internal hemorrhoids     MI (myocardial infarction) (H)     2008    Osteopenia     Osteoporosis     Palpitations     S/P breast  lumpectomy     Vaginal bleeding        PAST SURGICAL HISTORY:  Past Surgical History:   Procedure Laterality Date    CV CORONARY ANGIOGRAM N/A 10/16/2023    Procedure: Coronary Angiogram;  Surgeon: Kevin Ponce MD;  Location:  HEART [facility] CATH LAB    CV LEFT VENTRICULOGRAM N/A 10/16/2023    Procedure: Left Ventriculogram;  Surgeon: Kevin Ponce MD;  Location:  [facility]    CV PCI N/A 10/16/2023    Procedure: Percutaneous Coronary Intervention;  Surgeon: Kevin Ponce MD;  Location:  HE[facility]ARD[facility] CATH LAB    HEART CATH, ANGIOPLASTY      8/2008 angioplasty occluded distal OM branch    OPEN REDUCTION INTERNAL FIXATION FEMUR PROXIMAL Right 6/2/2018    Procedure: OPEN REDUCTION INTERNAL FIXATION FEMUR PROXIMAL;  OPEN REDUCTION INTERNAL FIXATION RIGHT FEMUR FRACTURE;  Surgeon: Perico Zapata MD;  Location:  OR       FAMILY HISTORY:  Family History   Problem Relation Age of Onset    Heart Disease Father     Leukemia Mother     Heart Disease Other        SOCIAL HISTORY:  Social History     Socioeconomic History    Marital status:      Spouse name: None    Number of children: None    Years of education: None    Highest education level: None   Tobacco Use    Smoking status: Never    Smokeless tobacco: Never   Substance and Sexual Activity    Alcohol use: Yes     Alcohol/week: 0.0 standard drinks of alcohol     Comment: rare     Drug use: No   Other Topics Concern    Caffeine Concern No     Comment: rare    Sleep Concern Yes    Stress Concern No    Weight Concern No    Special Diet No    Exercise No     Comment: walks 5-7 days week       Review of Systems:  Skin:        Eyes:       ENT:       Respiratory:  Positive for cough  Cardiovascular:  Negative;palpitations;chest pain;dizziness;syncope or near-syncope;cyanosis;edema lightheadedness;Positive for;fatigue  Gastroenterology:      Genitourinary:       Musculoskeletal:  Positive for    Neurologic:       Psychiatric:   "Positive for sleep disturbances  Heme/Lymph/Imm:       Endocrine:         Physical Exam:  Vitals: BP (!) 148/73   Pulse 73   Ht 1.715 m (5' 7.5\")   Wt 57.1 kg (125 lb 12.8 oz)   BMI 19.41 kg/m      Constitutional:           Skin:             Head:           Eyes:           Lymph:      ENT:           Neck:           Respiratory:            Cardiac:                                                           GI:           Extremities and Muscular Skeletal:                 Neurological:           Psych:           Recent Lab Results:  LIPID RESULTS:  Lab Results   Component Value Date    CHOL 152 12/01/2023    CHOL 183 10/19/2020    HDL 62 12/01/2023    HDL 67 10/19/2020    LDL 76 12/01/2023    LDL 97 10/19/2020    TRIG 69 12/01/2023    TRIG 94 10/19/2020    CHOLHDLRATIO 2.4 08/24/2015       LIVER ENZYME RESULTS:  Lab Results   Component Value Date    AST 43 10/15/2023    ALT 23 12/01/2023    ALT 19 09/14/2017       CBC RESULTS:  Lab Results   Component Value Date    WBC 5.5 10/17/2023    WBC 11.6 (H) 06/01/2018    RBC 4.23 10/17/2023    RBC 4.55 06/01/2018    HGB 12.4 10/17/2023    HGB 10.1 (L) 06/04/2018    HCT 38.3 10/17/2023    HCT 40.4 06/01/2018    MCV 91 10/17/2023    MCV 89 06/01/2018    MCH 29.3 10/17/2023    MCH 29.9 06/01/2018    MCHC 32.4 10/17/2023    MCHC 33.7 06/01/2018    RDW 13.3 10/17/2023    RDW 12.5 06/01/2018     10/17/2023     (L) 06/02/2018       BMP RESULTS:  Lab Results   Component Value Date     12/01/2023     10/19/2020    POTASSIUM 4.5 12/01/2023    POTASSIUM 4.4 12/09/2021    POTASSIUM 4.0 10/19/2020    CHLORIDE 104 12/01/2023    CHLORIDE 107 12/09/2021    CHLORIDE 108 10/19/2020    CO2 27 12/01/2023    CO2 29 12/09/2021    CO2 26 10/19/2020    ANIONGAP 9 12/01/2023    ANIONGAP 5 12/09/2021    ANIONGAP 6 10/19/2020     (H) 12/01/2023     (H) 12/09/2021     (H) 10/19/2020    BUN 12.4 12/01/2023    BUN 18 12/09/2021    BUN 14 10/19/2020    CR " 0.81 12/01/2023    CR 0.89 10/19/2020    GFRESTIMATED 78 12/01/2023    GFRESTIMATED 67 10/19/2020    GFRESTBLACK 78 10/19/2020    HEIDI 9.7 12/01/2023    HEIDI 8.4 (L) 10/19/2020        A1C RESULTS:  Lab Results   Component Value Date    A1C 5.8 (H) 10/15/2023       INR RESULTS:  Lab Results   Component Value Date    INR 1.03 06/01/2018    INR 1.02 08/15/2008     CC  No referring provider defined for this encounter.    Thank you for allowing me to participate in the care of your patient.      Sincerely,     Ginny Baldwin DO     [facility]

## 2024-01-15 ENCOUNTER — TELEPHONE (OUTPATIENT)
Dept: CARDIOLOGY | Facility: CLINIC | Age: 69
End: 2024-01-15
Payer: MEDICARE

## 2024-01-15 DIAGNOSIS — R00.2 PALPITATIONS: Primary | ICD-10-CM

## 2024-01-15 NOTE — TELEPHONE ENCOUNTER
Patient has concerns regarding hair loss on Metoprolol.  Patient would like to try different beta blocker due to significant hair loss. Per last OV note 12/5/23:    3.  RVOT VT-she is on metoprolol for this and has not had any issues in recent years.  She is possibly having some hair loss issues with chronic metoprolol use.  I will check her thyroid today and if this is normal we can consider a different beta-blocker.

## 2024-01-15 NOTE — TELEPHONE ENCOUNTER
[facility] Call Center    Phone Message    May a detailed message be left on voicemail: yes     Reason for Call: Other: Pt would like  a call back to discuss changing medication as she had a conversation about hair loss at her appt and would like to discuss the next steps and med change     Action Taken: Other: Cardio    Travel Screening: Not Applicable

## 2024-01-18 NOTE — TELEPHONE ENCOUNTER
Ginny Baldwin DO Lidke, Jen M, RN  Caller: Unspecified (3 days ago,  9:13 AM)  Can try Bystolic 5mg daily if her insurance will cover, otherwise atenolol 25mg daily    Message left for patient to return call to clinic.  Lynne Hobbs, RN on 1/18/2024 at 3:36 PM

## 2024-02-02 RX ORDER — NEBIVOLOL 5 MG/1
5 TABLET ORAL DAILY
Qty: 90 TABLET | Refills: 3 | Status: SHIPPED | OUTPATIENT
Start: 2024-02-02

## 2024-02-02 NOTE — TELEPHONE ENCOUNTER
Patient would like to try Bystolic through [facility].  Advised that she should stop the Metoprolol and start Bystolic in its place.  Patient will update RN should she have any negative side effects on Bystolic.  Patient has verbalized understanding.  Lynne Hobbs RN on 2/2/2024 at 10:36 AM

## 2024-09-26 ENCOUNTER — TELEPHONE (OUTPATIENT)
Dept: CARDIOLOGY | Facility: CLINIC | Age: 69
End: 2024-09-26
Payer: MEDICARE

## 2024-09-26 NOTE — TELEPHONE ENCOUNTER
[facility] Call Center    Phone Message    May a detailed message be left on voicemail: yes     Reason for Call: Other: Pt is requesting a ewelina back to discuss plavix and if she should continue taking it     Action Taken: Other: cardio    Travel Screening: Not Applicable    Thank you!  [facility]       Date of Service:

## 2024-09-26 NOTE — TELEPHONE ENCOUNTER
Called patient, advised her she should continue the Plavix and aspirin at least through October 16 as that will be the 1 year anniversary of her angiogram.  Patient states she does have 20 to pills of Plavix left so states she will continue for now but would prefer to stop the Plavix October 16 as she states she bruises very easily.  Patient denies any chest pain or shortness of breath.,  States she has been feeling fine.      Per office note dated 12/05/23, Dr. Baldwin recommended:   1.  Non-STEMI-unclear etiology but may be related to vasospastic disease versus microvascular disease she is now on amlodipine and has not had any recurrence.  I agree with DAPT for 1 year, she is having some minor bruising issues with this.  I also renewed a prescription for sublingual nitroglycerin to use for recurrent chest pain symptoms and gave her instruction on this medication.     2.  Hypertension-blood pressure was elevated today, I would ask her to check her blood pressure at home and if she is averaging above 140 systolic I would reinstitute lisinopril 2.5 mg daily.     3.  RVOT VT-she is on metoprolol for this and has not had any issues in recent years.  She is possibly having some hair loss issues with chronic metoprolol use.  I will check her thyroid today and if this is normal we can consider a different beta-blocker.     4.  Hyperlipidemia-on high intensity rosuvastatin, and LDL has dropped a few points from her previous measurement.      Will message Dr. Baldwin to review.  JODY Marte RN

## 2024-09-30 DIAGNOSIS — I21.4 NSTEMI (NON-ST ELEVATED MYOCARDIAL INFARCTION) (H): ICD-10-CM

## 2024-09-30 RX ORDER — CLOPIDOGREL BISULFATE 75 MG/1
75 TABLET ORAL DAILY
Qty: 90 TABLET | Refills: 0 | Status: SHIPPED | OUTPATIENT
Start: 2024-09-30 | End: 2024-10-01

## 2024-10-01 DIAGNOSIS — I21.19 INFERIOR MI (H): ICD-10-CM

## 2024-10-01 DIAGNOSIS — I21.4 NSTEMI (NON-ST ELEVATED MYOCARDIAL INFARCTION) (H): ICD-10-CM

## 2024-10-01 DIAGNOSIS — R07.9 CHEST PAIN, UNSPECIFIED TYPE: ICD-10-CM

## 2024-10-01 RX ORDER — NITROGLYCERIN 0.4 MG/1
TABLET SUBLINGUAL
Qty: 25 TABLET | Refills: 5 | Status: SHIPPED | OUTPATIENT
Start: 2024-10-01

## 2024-10-01 RX ORDER — CLOPIDOGREL BISULFATE 75 MG/1
75 TABLET ORAL DAILY
Status: SHIPPED
Start: 2024-10-01

## 2024-10-01 NOTE — TELEPHONE ENCOUNTER
"Received recommendations from Dr. Baldwin, \"That's fine.\"     Called patient, advised her she can stop her Plavix as of October 16.  Patient verbalized understanding.  JODY Marte RN  "

## 2024-12-09 ENCOUNTER — LAB (OUTPATIENT)
Dept: LAB | Facility: CLINIC | Age: 69
End: 2024-12-09
Payer: MEDICARE

## 2024-12-09 DIAGNOSIS — E78.2 MIXED HYPERLIPIDEMIA: ICD-10-CM

## 2024-12-09 LAB
ALT SERPL W P-5'-P-CCNC: 17 U/L (ref 0–50)
CHOLEST SERPL-MCNC: 155 MG/DL
FASTING STATUS PATIENT QL REPORTED: YES
HDLC SERPL-MCNC: 60 MG/DL
LDLC SERPL CALC-MCNC: 80 MG/DL
NONHDLC SERPL-MCNC: 95 MG/DL
TRIGL SERPL-MCNC: 73 MG/DL

## (undated) DEVICE — KIT HAND CONTROL ANGIOTOUCH ACIST 65CM AT-P65

## (undated) DEVICE — GW VASC 190CM .014IN HI-TRQ 1009660J

## (undated) DEVICE — GLOVE PROTEXIS POWDER FREE 7.5 ORTHOPEDIC 2D73ET75

## (undated) DEVICE — CATH ANGIO JUDKINS JL4 6FRX100CM INFINITI 534620T

## (undated) DEVICE — GUIDEWIRE

## (undated) DEVICE — SU VICRYL 2-0 CT-1 27" UND J259H

## (undated) DEVICE — PACK HIP NAILING SOP15HNSB

## (undated) DEVICE — CATH DIAGNOSTIC RADIAL 5FR TIG 4.0

## (undated) DEVICE — GLOVE PROTEXIS BLUE W/NEU-THERA 7.0  2D73EB70

## (undated) DEVICE — PACK TOTAL KNEE SOP15TKFSD

## (undated) DEVICE — SU VICRYL 0 CT-1 CR 8X18" J740D

## (undated) DEVICE — ESU GROUND PAD UNIVERSAL W/O CORD

## (undated) DEVICE — INTRO GLIDESHEATH SLENDER 6FR 10X45CM 60-1060

## (undated) DEVICE — CAST PADDING 6" UNSTERILE 9046

## (undated) DEVICE — MANIFOLD KIT ANGIO AUTOMATED 014613

## (undated) DEVICE — TOTE ANGIO CORP PC15AT SAN32CC83O

## (undated) DEVICE — SLEEVE TR BAND RADIAL COMPRESSION DEVICE 24CM TRB24-REG

## (undated) DEVICE — DEFIB PRO-PADZ LVP LQD GEL ADULT 8900-2105-01

## (undated) DEVICE — GLOVE PROTEXIS POWDER FREE 6.5 ORTHOPEDIC 2D73ET65

## (undated) DEVICE — GLOVE PROTEXIS W/NEU-THERA 8.0  2D73TE80

## (undated) DEVICE — LIGHT HANDLE X2

## (undated) DEVICE — GLOVE PROTEXIS W/NEU-THERA 7.0  2D73TE70

## (undated) DEVICE — LINEN TOWEL PACK X5 5464

## (undated) DEVICE — DRAPE SHEET REV FOLD 3/4 9349

## (undated) DEVICE — GLOVE PROTEXIS BLUE W/NEU-THERA 8.0  2D73EB80

## (undated) DEVICE — RAD G/W INQWIRE .035X260CM J-TIP EXCHANGE IQ35F260J1O5RS

## (undated) DEVICE — Device

## (undated) DEVICE — ROD SYN REAMER BALL TIP 2.5X950MM  351.706S

## (undated) DEVICE — SOL WATER IRRIG 1000ML BOTTLE 2F7114

## (undated) DEVICE — STPL SKIN 35W APPOSE 8886803712

## (undated) DEVICE — SOL NACL 0.9% IRRIG 1000ML BOTTLE 07138-09

## (undated) DEVICE — GLOVE PROTEXIS W/NEU-THERA 7.5  2D73TE75

## (undated) DEVICE — CATH ANGIO INFINITI PIGTAIL 145 6 SH 6FRX110CM  534-652S

## (undated) RX ORDER — KETAMINE HYDROCHLORIDE 10 MG/ML
INJECTION, SOLUTION INTRAMUSCULAR; INTRAVENOUS
Status: DISPENSED
Start: 2018-06-02

## (undated) RX ORDER — FENTANYL CITRATE 50 UG/ML
INJECTION, SOLUTION INTRAMUSCULAR; INTRAVENOUS
Status: DISPENSED
Start: 2018-06-02

## (undated) RX ORDER — NITROGLYCERIN 5 MG/ML
VIAL (ML) INTRAVENOUS
Status: DISPENSED
Start: 2023-10-16

## (undated) RX ORDER — FENTANYL CITRATE 50 UG/ML
INJECTION, SOLUTION INTRAMUSCULAR; INTRAVENOUS
Status: DISPENSED
Start: 2023-10-16

## (undated) RX ORDER — LIDOCAINE HYDROCHLORIDE 10 MG/ML
INJECTION, SOLUTION EPIDURAL; INFILTRATION; INTRACAUDAL; PERINEURAL
Status: DISPENSED
Start: 2023-10-16

## (undated) RX ORDER — PROPOFOL 10 MG/ML
INJECTION, EMULSION INTRAVENOUS
Status: DISPENSED
Start: 2018-06-02

## (undated) RX ORDER — HEPARIN SODIUM 200 [USP'U]/100ML
INJECTION, SOLUTION INTRAVENOUS
Status: DISPENSED
Start: 2023-10-16

## (undated) RX ORDER — CEFAZOLIN SODIUM 2 G/100ML
INJECTION, SOLUTION INTRAVENOUS
Status: DISPENSED
Start: 2018-06-02

## (undated) RX ORDER — HEPARIN SODIUM 1000 [USP'U]/ML
INJECTION, SOLUTION INTRAVENOUS; SUBCUTANEOUS
Status: DISPENSED
Start: 2023-10-16

## (undated) RX ORDER — VERAPAMIL HYDROCHLORIDE 2.5 MG/ML
INJECTION, SOLUTION INTRAVENOUS
Status: DISPENSED
Start: 2023-10-16

## (undated) RX ORDER — METOPROLOL SUCCINATE 25 MG/1
TABLET, EXTENDED RELEASE ORAL
Status: DISPENSED
Start: 2018-06-02